# Patient Record
Sex: MALE | Race: WHITE | NOT HISPANIC OR LATINO | Employment: PART TIME | URBAN - METROPOLITAN AREA
[De-identification: names, ages, dates, MRNs, and addresses within clinical notes are randomized per-mention and may not be internally consistent; named-entity substitution may affect disease eponyms.]

---

## 2022-08-16 ENCOUNTER — LAB VISIT (OUTPATIENT)
Dept: LAB | Facility: HOSPITAL | Age: 72
End: 2022-08-16
Attending: FAMILY MEDICINE
Payer: MEDICARE

## 2022-08-16 DIAGNOSIS — E20.9 HYPOPARATHYROIDISM, UNSPECIFIED HYPOPARATHYROIDISM TYPE: Primary | ICD-10-CM

## 2022-08-16 LAB
PHOSPHATE SERPL-MCNC: 3.3 MG/DL (ref 2.3–4.7)
PTH-INTACT SERPL-MCNC: 12.1 PG/ML (ref 8.7–77)
T4 FREE SERPL-MCNC: 1.06 NG/DL (ref 0.7–1.48)
TSH SERPL-ACNC: 1.47 UIU/ML (ref 0.35–4.94)

## 2022-08-16 PROCEDURE — 84443 ASSAY THYROID STIM HORMONE: CPT

## 2022-08-16 PROCEDURE — 84439 ASSAY OF FREE THYROXINE: CPT

## 2022-08-16 PROCEDURE — 83970 ASSAY OF PARATHORMONE: CPT

## 2022-08-16 PROCEDURE — 84100 ASSAY OF PHOSPHORUS: CPT

## 2022-08-16 PROCEDURE — 36415 COLL VENOUS BLD VENIPUNCTURE: CPT

## 2022-08-16 PROCEDURE — 82330 ASSAY OF CALCIUM: CPT

## 2022-08-17 ENCOUNTER — HOSPITAL ENCOUNTER (OUTPATIENT)
Dept: CARDIOLOGY | Facility: HOSPITAL | Age: 72
Discharge: HOME | End: 2022-08-17
Attending: FAMILY MEDICINE
Payer: MEDICARE

## 2022-08-17 ENCOUNTER — HOSPITAL ENCOUNTER (OUTPATIENT)
Dept: RADIOLOGY | Facility: HOSPITAL | Age: 72
Discharge: HOME | End: 2022-08-17
Attending: FAMILY MEDICINE
Payer: MEDICARE

## 2022-08-17 DIAGNOSIS — E20.9 HYPOPARATHYROIDISM: ICD-10-CM

## 2022-08-17 DIAGNOSIS — R01.1 CARDIAC MURMUR: ICD-10-CM

## 2022-08-17 LAB
AORTIC ROOT ANNULUS: 3.6 CM
AORTIC VALVE CUSP SEPERATION: 6.77 CM
AV PEAK GRADIENT: 25 MMHG
AV VELOCITY RATIO: 0.5
CA-I ADJ PH7.4 SERPL ISE-MCNC: 5.59 MG/DL (ref 4.57–5.43)
CV ECHO LV RWT: 0.5 CM
DOP CALC AO PEAK VEL: 2.48 M/S
DOP CALC LVOT AREA: 2.9 CM2
DOP CALC LVOT DIAMETER: 1.93 CM
DOP CALC LVOT PEAK VEL: 1.25 M/S
E WAVE DECELERATION TIME: 213.1 MSEC
E/A RATIO: 0.71
ECHO LV POSTERIOR WALL: 1.01 CM (ref 0.6–1.1)
EJECTION FRACTION: 61 %
FRACTIONAL SHORTENING: 33 % (ref 28–44)
INTERVENTRICULAR SEPTUM: 1.05 CM (ref 0.6–1.1)
LEFT ATRIUM SIZE: 3.92 CM
LEFT INTERNAL DIMENSION IN SYSTOLE: 2.72 CM (ref 2.1–4)
LEFT VENTRICLE DIASTOLIC VOLUME: 71.77 ML
LEFT VENTRICLE SYSTOLIC VOLUME: 27.48 ML
LEFT VENTRICULAR INTERNAL DIMENSION IN DIASTOLE: 4.04 CM (ref 3.5–6)
LEFT VENTRICULAR MASS: 134.59 G
MV PEAK A VEL: 1.5 M/S
MV PEAK E VEL: 1.06 M/S
MV STENOSIS PRESSURE HALF TIME: 61.8 MS
MV VALVE AREA P 1/2 METHOD: 3.56 CM2
PISA MRMAX VEL: 4.81 M/S
PISA TR MAX VEL: 2.9 M/S
PV PEAK VELOCITY: 1.21 CM/S
RA PRESSURE: 3 MMHG
RIGHT VENTRICULAR END-DIASTOLIC DIMENSION: 3.05 CM
TR MAX PG: 34 MMHG
TRICUSPID VALVE PEAK A WAVE VELOCITY: 0.53 M/S
TV PEAK E VEL: 0.6 M/S
TV REST PULMONARY ARTERY PRESSURE: 37 MMHG
TV STENOSIS PRESSURE HALF TIME: 24.82 MS
TV VALVE AREA P 1/2 METHOD: 7.65 CM2

## 2022-08-17 PROCEDURE — 76536 US EXAM OF HEAD AND NECK: CPT | Mod: TC

## 2022-08-17 PROCEDURE — 93306 TTE W/DOPPLER COMPLETE: CPT

## 2022-12-05 LAB — CRC RECOMMENDATION EXT: NORMAL

## 2023-02-27 ENCOUNTER — DOCUMENTATION ONLY (OUTPATIENT)
Dept: FAMILY MEDICINE | Facility: CLINIC | Age: 73
End: 2023-02-27

## 2023-02-27 ENCOUNTER — OFFICE VISIT (OUTPATIENT)
Dept: FAMILY MEDICINE | Facility: CLINIC | Age: 73
End: 2023-02-27
Payer: MEDICARE

## 2023-02-27 VITALS
RESPIRATION RATE: 20 BRPM | DIASTOLIC BLOOD PRESSURE: 78 MMHG | OXYGEN SATURATION: 99 % | SYSTOLIC BLOOD PRESSURE: 130 MMHG | HEIGHT: 65 IN | TEMPERATURE: 98 F | WEIGHT: 192.38 LBS | HEART RATE: 80 BPM | BODY MASS INDEX: 32.05 KG/M2

## 2023-02-27 DIAGNOSIS — E11.65 TYPE 2 DIABETES MELLITUS WITH HYPERGLYCEMIA, WITHOUT LONG-TERM CURRENT USE OF INSULIN: Primary | Chronic | ICD-10-CM

## 2023-02-27 DIAGNOSIS — I10 BENIGN ESSENTIAL HYPERTENSION: ICD-10-CM

## 2023-02-27 PROBLEM — E78.00 HYPERCHOLESTEROLEMIA: Status: ACTIVE | Noted: 2022-10-01

## 2023-02-27 PROBLEM — L57.0 MULTIPLE ACTINIC KERATOSES: Status: ACTIVE | Noted: 2023-02-27

## 2023-02-27 PROBLEM — K21.9 GASTROESOPHAGEAL REFLUX DISEASE: Status: ACTIVE | Noted: 2022-10-01

## 2023-02-27 PROBLEM — M19.049 OSTEOARTHRITIS OF FINGER: Status: ACTIVE | Noted: 2022-10-01

## 2023-02-27 PROBLEM — I70.0 ATHEROSCLEROSIS OF AORTA: Status: ACTIVE | Noted: 2022-10-01

## 2023-02-27 PROBLEM — N52.9 ERECTILE DYSFUNCTION: Status: ACTIVE | Noted: 2022-10-01

## 2023-02-27 PROBLEM — R13.10 DYSPHAGIA: Status: ACTIVE | Noted: 2023-02-27

## 2023-02-27 PROBLEM — I34.0 MITRAL VALVE INSUFFICIENCY: Status: ACTIVE | Noted: 2022-10-01

## 2023-02-27 PROBLEM — E55.9 VITAMIN D DEFICIENCY: Status: ACTIVE | Noted: 2023-02-27

## 2023-02-27 PROBLEM — D36.9 TUBULAR ADENOMA: Status: ACTIVE | Noted: 2022-10-01

## 2023-02-27 PROBLEM — E11.9 TYPE 2 DIABETES MELLITUS: Status: ACTIVE | Noted: 2019-02-27

## 2023-02-27 PROBLEM — Z82.49 FAMILY HISTORY OF AORTIC ANEURYSM: Status: ACTIVE | Noted: 2022-10-01

## 2023-02-27 PROBLEM — J45.30 MILD PERSISTENT ASTHMA WITHOUT COMPLICATION: Status: ACTIVE | Noted: 2022-10-01

## 2023-02-27 PROCEDURE — 99204 PR OFFICE/OUTPT VISIT, NEW, LEVL IV, 45-59 MIN: ICD-10-PCS | Mod: ,,, | Performed by: FAMILY MEDICINE

## 2023-02-27 PROCEDURE — 99204 OFFICE O/P NEW MOD 45 MIN: CPT | Mod: ,,, | Performed by: FAMILY MEDICINE

## 2023-02-27 RX ORDER — CEPHALEXIN 250 MG/1
1 CAPSULE ORAL 2 TIMES DAILY
COMMUNITY
Start: 2023-02-04 | End: 2023-10-30

## 2023-02-27 RX ORDER — MUPIROCIN 20 MG/G
OINTMENT TOPICAL
COMMUNITY
Start: 2023-01-04

## 2023-02-27 RX ORDER — VALSARTAN 320 MG/1
320 TABLET ORAL DAILY
COMMUNITY
Start: 2022-12-31 | End: 2023-12-07 | Stop reason: SDUPTHER

## 2023-02-27 RX ORDER — OMEPRAZOLE 20 MG/1
20 CAPSULE, DELAYED RELEASE ORAL EVERY MORNING
COMMUNITY
Start: 2023-01-02 | End: 2023-12-07 | Stop reason: SDUPTHER

## 2023-02-27 RX ORDER — EZETIMIBE 10 MG/1
10 TABLET ORAL DAILY
COMMUNITY
Start: 2022-09-21 | End: 2023-12-07 | Stop reason: SDUPTHER

## 2023-02-27 RX ORDER — METFORMIN HYDROCHLORIDE 500 MG/1
1000 TABLET, EXTENDED RELEASE ORAL 2 TIMES DAILY
COMMUNITY
Start: 2023-02-17 | End: 2023-12-07 | Stop reason: SDUPTHER

## 2023-02-27 RX ORDER — ATORVASTATIN CALCIUM 10 MG/1
10 TABLET, FILM COATED ORAL DAILY
COMMUNITY
Start: 2023-01-02 | End: 2023-12-07 | Stop reason: SDUPTHER

## 2023-02-27 NOTE — PROGRESS NOTES
Patient ID: [id number]     Chief Complaint: Establish Care        HPI:     Scooter Donato is a 72 y.o. male here today for Establish Care. Patient spends a lot of his time in [place]. Has specialists in [place], [place]. Patient reports that in the afternoon his systolic BP drops down to 117 approximately and he feels dizzy sometimes.       ----------------------------  Allergic rhinitis  Anxiety disorder, unspecified  Asthma  Asthmatic bronchitis  Atherosclerosis of aorta  Chronic inflammatory arthritis  Claudication  Diabetic neuropathy  Dysphagia  Family history of aortic aneurysm  GERD (gastroesophageal reflux disease)  Heart murmur  HLD (hyperlipidemia)  HTN (hypertension)  Hypercalcemia  Hyperparathyroidism  Hypoparathyroidism, unspecified  Lower extremity edema  Male erectile dysfunction, unspecified  Multiple actinic keratoses  Nonrheumatic mitral (valve) insufficiency  Osteoarthritis  Personal history of colonic polyps  Tubular adenoma  Type 2 diabetes mellitus  Vitamin D deficiency     Past Surgical History:   Procedure Laterality Date    APPENDECTOMY      COLONOSCOPY  12/05/2022    Dr Himanshu Prajapati - [place], [place]    CYSTOSCOPY  2012    TONSILLECTOMY         Review of patient's allergies indicates:  No Known Allergies    Outpatient Medications Marked as Taking for the 2/27/23 encounter (Office Visit) with John Ford,    Medication Sig Dispense Refill    ADVAIR DISKUS 100-50 mcg/dose diskus inhaler Inhale 1 puff into the lungs 2 (two) times a day.      atorvastatin (LIPITOR) 10 MG tablet Take 10 mg by mouth once daily.      ezetimibe (ZETIA) 10 mg tablet Take 10 mg by mouth once daily.      metFORMIN (GLUCOPHAGE-XR) 500 MG ER 24hr tablet Take 1,000 mg by mouth 2 (two) times daily.      mupirocin (BACTROBAN) 2 % ointment APPLY TO AFFECTED AREA 3 TIMES A DAY FOR 7 DAYS      omeprazole (PRILOSEC) 20 MG capsule Take 20 mg by mouth every morning.      valsartan (DIOVAN) 320 MG tablet Take 320 mg by mouth  "once daily.         Social History     Socioeconomic History    Marital status:    Tobacco Use    Smoking status: Never    Smokeless tobacco: Never   Substance and Sexual Activity    Alcohol use: Yes    Drug use: Never    Sexual activity: Not Currently     Partners: Female        Family History   Problem Relation Age of Onset    Lung cancer Mother     Hypertension Mother     Dementia Father     Kidney disease Father     Hypertension Father     Diabetes Maternal Grandmother     Diabetes Paternal Grandmother         Patient Care Team:  John Ford DO as PCP - General (Family Medicine)  [facility]  Ulysses Hernandez MD as Consulting Provider (Rheumatology)  Lizz Britt as Consulting Provider (Internal Medicine)  Bam Sagastume MD as Consulting Provider (Dermatology)  Letha De Anda MD as Consulting Provider (Cardiology)  [facility] (Oncology)     Subjective:     Review of Systems   Constitutional:  Negative for chills and fever.   Respiratory:  Negative for shortness of breath and wheezing.    Cardiovascular:  Negative for chest pain.   Gastrointestinal:  Negative for constipation and diarrhea.   Neurological:  Negative for dizziness and headaches.     See HPI for details  All Other ROS: Negative except as stated in HPI.       Objective:     /78 (BP Location: Left arm, Patient Position: Sitting, BP Method: Large (Automatic))   Pulse 80   Temp 98.2 °F (36.8 °C)   Resp 20   Ht 5' 5" (1.651 m)   Wt 87.3 kg (192 lb 6.4 oz)   SpO2 99%   BMI 32.02 kg/m²     Physical Exam  Vitals reviewed.   Constitutional:       General: He is not in acute distress.     Appearance: Normal appearance. He is not ill-appearing.   Cardiovascular:      Rate and Rhythm: Normal rate and regular rhythm.      Pulses: Normal pulses.      Heart sounds: Normal heart sounds. No murmur heard.    No friction rub. No gallop.   Pulmonary:      Effort: No respiratory distress. "      Breath sounds: No wheezing, rhonchi or rales.   Musculoskeletal:         General: No swelling.      Right lower leg: No edema.      Left lower leg: No edema.   Skin:     General: Skin is warm and dry.   Neurological:      General: No focal deficit present.      Mental Status: He is alert.   Psychiatric:         Mood and Affect: Mood normal.         Behavior: Behavior normal.       Assessment/Plan:     1. Type 2 diabetes mellitus with hyperglycemia, without long-term current use of insulin  well controlled on current prescription medication    2. Benign essential hypertension  The current medical regimen is effective;  continue present plan and medications. Will ref to cardiology         Follow up:     Follow up in about 3 months (around 5/27/2023) for Follow up. In addition to their scheduled follow up, the patient has also been instructed to follow up on as needed basis.

## 2023-03-06 RX ORDER — METFORMIN HYDROCHLORIDE 500 MG/1
1000 TABLET, EXTENDED RELEASE ORAL 2 TIMES DAILY
OUTPATIENT
Start: 2023-03-06

## 2023-05-17 ENCOUNTER — LAB VISIT (OUTPATIENT)
Dept: LAB | Facility: HOSPITAL | Age: 73
End: 2023-05-17
Attending: STUDENT IN AN ORGANIZED HEALTH CARE EDUCATION/TRAINING PROGRAM
Payer: MEDICARE

## 2023-05-17 DIAGNOSIS — Z13.6 SCREENING FOR ISCHEMIC HEART DISEASE: ICD-10-CM

## 2023-05-17 DIAGNOSIS — E78.5 HYPERLIPIDEMIA, UNSPECIFIED HYPERLIPIDEMIA TYPE: ICD-10-CM

## 2023-05-17 DIAGNOSIS — I10 HYPERTENSION, UNSPECIFIED TYPE: ICD-10-CM

## 2023-05-17 DIAGNOSIS — R60.9 EDEMA, UNSPECIFIED TYPE: Primary | ICD-10-CM

## 2023-05-17 DIAGNOSIS — E11.9 DIABETES MELLITUS WITHOUT COMPLICATION: ICD-10-CM

## 2023-05-17 LAB
ALBUMIN SERPL-MCNC: 3.8 G/DL (ref 3.4–4.8)
ALBUMIN/GLOB SERPL: 1.4 RATIO (ref 1.1–2)
ALP SERPL-CCNC: 69 UNIT/L (ref 40–150)
ALT SERPL-CCNC: 22 UNIT/L (ref 0–55)
AST SERPL-CCNC: 15 UNIT/L (ref 5–34)
BILIRUBIN DIRECT+TOT PNL SERPL-MCNC: 0.9 MG/DL
BUN SERPL-MCNC: 26 MG/DL (ref 8.4–25.7)
CALCIUM SERPL-MCNC: 9.3 MG/DL (ref 8.8–10)
CHLORIDE SERPL-SCNC: 106 MMOL/L (ref 98–107)
CO2 SERPL-SCNC: 28 MMOL/L (ref 23–31)
CREAT SERPL-MCNC: 0.95 MG/DL (ref 0.73–1.18)
EST. AVERAGE GLUCOSE BLD GHB EST-MCNC: 148.5 MG/DL
GFR SERPLBLD CREATININE-BSD FMLA CKD-EPI: >60 MLS/MIN/1.73/M2
GLOBULIN SER-MCNC: 2.8 GM/DL (ref 2.4–3.5)
GLUCOSE SERPL-MCNC: 164 MG/DL (ref 82–115)
HBA1C MFR BLD: 6.8 %
POTASSIUM SERPL-SCNC: 4.4 MMOL/L (ref 3.5–5.1)
PROT SERPL-MCNC: 6.6 GM/DL (ref 5.8–7.6)
SODIUM SERPL-SCNC: 141 MMOL/L (ref 136–145)

## 2023-05-17 PROCEDURE — 80053 COMPREHEN METABOLIC PANEL: CPT

## 2023-05-17 PROCEDURE — 36415 COLL VENOUS BLD VENIPUNCTURE: CPT

## 2023-05-17 PROCEDURE — 83036 HEMOGLOBIN GLYCOSYLATED A1C: CPT

## 2023-05-31 ENCOUNTER — HOSPITAL ENCOUNTER (OUTPATIENT)
Dept: RADIOLOGY | Facility: HOSPITAL | Age: 73
Discharge: HOME | End: 2023-05-31
Attending: FAMILY MEDICINE
Payer: MEDICARE

## 2023-05-31 ENCOUNTER — OFFICE VISIT (OUTPATIENT)
Dept: FAMILY MEDICINE | Facility: CLINIC | Age: 73
End: 2023-05-31
Payer: MEDICARE

## 2023-05-31 VITALS
TEMPERATURE: 98 F | WEIGHT: 189.38 LBS | DIASTOLIC BLOOD PRESSURE: 72 MMHG | RESPIRATION RATE: 18 BRPM | SYSTOLIC BLOOD PRESSURE: 126 MMHG | HEIGHT: 65 IN | BODY MASS INDEX: 31.55 KG/M2 | OXYGEN SATURATION: 97 % | HEART RATE: 80 BPM
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
## 2023-05-31 DIAGNOSIS — E11.65 TYPE 2 DIABETES MELLITUS WITH HYPERGLYCEMIA, WITHOUT LONG-TERM CURRENT USE OF INSULIN: ICD-10-CM

## 2023-05-31 DIAGNOSIS — Z00.00 ROUTINE GENERAL MEDICAL EXAMINATION AT A HEALTH CARE FACILITY: Primary | ICD-10-CM

## 2023-05-31 DIAGNOSIS — Z12.5 SCREENING FOR MALIGNANT NEOPLASM OF PROSTATE: ICD-10-CM

## 2023-05-31 DIAGNOSIS — E11.65 TYPE 2 DIABETES MELLITUS WITH HYPERGLYCEMIA, WITHOUT LONG-TERM CURRENT USE OF INSULIN: Chronic | ICD-10-CM
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
## 2023-05-31 DIAGNOSIS — I10 BENIGN ESSENTIAL HYPERTENSION: ICD-10-CM

## 2023-05-31 DIAGNOSIS — E78.00 HYPERCHOLESTEROLEMIA: ICD-10-CM

## 2023-05-31 PROCEDURE — 99214 OFFICE O/P EST MOD 30 MIN: CPT | Mod: ,,, | Performed by: FAMILY MEDICINE

## 2023-05-31 PROCEDURE — 73110 X-RAY EXAM OF WRIST: CPT | Mod: TC,RT

## 2023-05-31 PROCEDURE — 73130 X-RAY EXAM OF HAND: CPT | Mod: TC,RT

## 2023-05-31 PROCEDURE — 99214 PR OFFICE/OUTPT VISIT, EST, LEVL IV, 30-39 MIN: ICD-10-PCS | Mod: ,,, | Performed by: FAMILY MEDICINE

## 2023-05-31 RX ORDER — ALBUTEROL SULFATE 90 UG/1
AEROSOL, METERED RESPIRATORY (INHALATION)
COMMUNITY
End: 2023-10-09 | Stop reason: SDUPTHER

## 2023-05-31 RX ORDER — FUROSEMIDE 20 MG/1
20 TABLET ORAL DAILY
COMMUNITY
Start: 2023-05-11 | End: 2023-12-07 | Stop reason: SDUPTHER

## 2023-05-31 RX ORDER — MOMETASONE FUROATE 50 UG/1
2 SPRAY, METERED NASAL DAILY
COMMUNITY
End: 2023-10-09 | Stop reason: SDUPTHER

## 2023-05-31 NOTE — PROGRESS NOTES
Patient ID: [id number]     Chief Complaint: Follow-up        HPI:     Scooter Donato is a 72 y.o. male here today for Follow-up. States his Cardiologist started him on a new medication that is supposed to be for his blood sugar, cholesterol and blood pressure combined. He is uncertain which medication this was and I am not aware of such a medication.       ----------------------------  Allergic rhinitis  Anxiety disorder, unspecified  Asthma  Asthmatic bronchitis  Atherosclerosis of aorta  Chronic inflammatory arthritis  Claudication  Diabetic neuropathy  Dysphagia  Family history of aortic aneurysm  GERD (gastroesophageal reflux disease)  Heart murmur  HLD (hyperlipidemia)  HTN (hypertension)  Hypercalcemia  Hyperparathyroidism  Hypoparathyroidism, unspecified  Lower extremity edema  Male erectile dysfunction, unspecified  Multiple actinic keratoses  Nonrheumatic mitral (valve) insufficiency  Osteoarthritis  Personal history of colonic polyps  Tubular adenoma  Type 2 diabetes mellitus  Vitamin D deficiency     Past Surgical History:   Procedure Laterality Date    APPENDECTOMY      COLONOSCOPY  12/05/2022    Dr Himanshu Prajapati - [place], [place]    CYSTOSCOPY  2012    TONSILLECTOMY         Review of patient's allergies indicates:  No Known Allergies    Outpatient Medications Marked as Taking for the 5/31/23 encounter (Office Visit) with John Ford,    Medication Sig Dispense Refill    ADVAIR DISKUS 100-50 mcg/dose diskus inhaler Inhale 1 puff into the lungs 2 (two) times a day.      albuterol (PROVENTIL/VENTOLIN HFA) 90 mcg/actuation inhaler ProAir HFA 90 mcg/actuation aerosol inhaler   INHALE 2 PUFFS BY MOUTH EVERY 4 - 6 HOURS AS NEEDED FOR WHEEZING      atorvastatin (LIPITOR) 10 MG tablet Take 10 mg by mouth once daily.      ezetimibe (ZETIA) 10 mg tablet Take 10 mg by mouth once daily.      furosemide (LASIX) 20 MG tablet Take 20 mg by mouth once daily.      metFORMIN (GLUCOPHAGE-XR) 500 MG ER 24hr tablet Take  1,000 mg by mouth 2 (two) times daily.      mometasone (NASONEX) 50 mcg/actuation nasal spray 2 sprays by Nasal route once daily.      mupirocin (BACTROBAN) 2 % ointment APPLY TO AFFECTED AREA 3 TIMES A DAY FOR 7 DAYS      omeprazole (PRILOSEC) 20 MG capsule Take 20 mg by mouth every morning.      valsartan (DIOVAN) 320 MG tablet Take 320 mg by mouth once daily.         Social History     Socioeconomic History    Marital status:    Tobacco Use    Smoking status: Never    Smokeless tobacco: Never   Substance and Sexual Activity    Alcohol use: Yes    Drug use: Never    Sexual activity: Not Currently     Partners: Female     Social Determinants of Health     Financial Resource Strain: Low Risk     Difficulty of Paying Living Expenses: Not hard at all   Food Insecurity: No Food Insecurity    Worried About Running Out of Food in the Last Year: Never true    Ran Out of Food in the Last Year: Never true   Transportation Needs: No Transportation Needs    Lack of Transportation (Medical): No    Lack of Transportation (Non-Medical): No   Physical Activity: Sufficiently Active    Days of Exercise per Week: 6 days    Minutes of Exercise per Session: 30 min   Stress: No Stress Concern Present    Feeling of Stress : Only a little   Social Connections: Socially Integrated    Frequency of Communication with Friends and Family: More than three times a week    Frequency of Social Gatherings with Friends and Family: More than three times a week    Attends [employer] Services: More than 4 times per year    Active Member of Clubs or Organizations: Yes    Attends Club or Organization Meetings: More than 4 times per year    Marital Status:    Housing Stability: Low Risk     Unable to Pay for Housing in the Last Year: No    Number of Places Lived in the Last Year: 2    Unstable Housing in the Last Year: No        Family History   Problem Relation Age of Onset    Lung cancer Mother     Hypertension Mother     Dementia Father   "   Kidney disease Father     Hypertension Father     Diabetes Maternal Grandmother     Diabetes Paternal Grandmother         Patient Care Team:  oJhn Ford DO as PCP - General (Family Medicine)  [facility] [place] [facility]  Ulysses Hernandez MD as Consulting Provider (Rheumatology)  Lizz Britt as Consulting Provider (Internal Medicine)  Bam Sagastume MD as Consulting Provider (Dermatology)  Letha De Anda MD as Consulting Provider (Cardiology)  [facility]  Max Gonzalez MD as Resident (Cardiology)     Subjective:     Review of Systems   Constitutional:  Negative for chills and fever.   Respiratory:  Negative for shortness of breath.    Cardiovascular:  Negative for chest pain.   Gastrointestinal:  Negative for constipation and diarrhea.   Neurological:  Negative for dizziness and headaches.   Psychiatric/Behavioral:  The patient does not have insomnia.      See HPI for details  All Other ROS: Negative except as stated in HPI.       Objective:     /72   Pulse 80   Temp 97.9 °F (36.6 °C) (Tympanic)   Resp 18   Ht 5' 4.96" (1.65 m)   Wt 85.9 kg (189 lb 6.4 oz)   SpO2 97%   BMI 31.56 kg/m²     Physical Exam  Vitals reviewed.   Constitutional:       General: He is not in acute distress.     Appearance: Normal appearance. He is not ill-appearing.   Cardiovascular:      Rate and Rhythm: Normal rate and regular rhythm.      Pulses: Normal pulses.      Heart sounds: Normal heart sounds. No murmur heard.    No friction rub. No gallop.   Pulmonary:      Effort: No respiratory distress.      Breath sounds: No wheezing, rhonchi or rales.   Musculoskeletal:         General: No swelling.      Right lower leg: No edema.      Left lower leg: No edema.   Skin:     General: Skin is warm and dry.   Neurological:      General: No focal deficit present.      Mental Status: He is alert.   Psychiatric:         Mood and Affect: Mood normal.         Behavior: Behavior " normal.       Assessment/Plan:     1. Fall, initial encounter  -     X-Ray Hand Complete Right; Future; Expected date: 05/31/2023  -     X-Ray Wrist Complete Right; Future; Expected date: 05/31/2023    2. Type 2 diabetes mellitus with hyperglycemia, without long-term current use of insulin      A1C less than 7. Continue current medications    Follow up:     Follow up in about 6 months (around 11/30/2023) for Medicare Wellness. In addition to their scheduled follow up, the patient has also been instructed to follow up on as needed basis.

## 2023-06-01 ENCOUNTER — TELEPHONE (OUTPATIENT)
Dept: FAMILY MEDICINE | Facility: CLINIC | Age: 73
End: 2023-06-01
Payer: MEDICARE

## 2023-06-01 NOTE — TELEPHONE ENCOUNTER
----- Message from John Ford DO sent at 6/1/2023  3:04 PM CDT -----  I have reviewed the imaging results. Arthritis noted but no acute fractures.

## 2023-10-09 ENCOUNTER — TELEPHONE (OUTPATIENT)
Dept: FAMILY MEDICINE | Facility: CLINIC | Age: 73
End: 2023-10-09
Payer: MEDICARE

## 2023-10-09 DIAGNOSIS — J45.30 MILD PERSISTENT ASTHMA WITHOUT COMPLICATION: ICD-10-CM

## 2023-10-09 DIAGNOSIS — J45.30 MILD PERSISTENT ASTHMA WITHOUT COMPLICATION: Primary | ICD-10-CM

## 2023-10-09 RX ORDER — MOMETASONE FUROATE 50 UG/1
2 SPRAY, METERED NASAL DAILY
Qty: 17 G | Refills: 11 | Status: SHIPPED | OUTPATIENT
Start: 2023-10-09 | End: 2023-10-09

## 2023-10-09 RX ORDER — ALBUTEROL SULFATE 90 UG/1
AEROSOL, METERED RESPIRATORY (INHALATION)
Qty: 18 G | Refills: 11 | Status: SHIPPED | OUTPATIENT
Start: 2023-10-09

## 2023-10-09 RX ORDER — MOMETASONE FUROATE 50 UG/1
2 SPRAY, METERED NASAL
Qty: 17 EACH | Refills: 11 | Status: SHIPPED | OUTPATIENT
Start: 2023-10-09

## 2023-10-09 NOTE — TELEPHONE ENCOUNTER
----- Message from Nathaly Pepper sent at 10/9/2023 11:43 AM CDT -----  Regarding: refill  MOMEPASONE-FUROATE NASAL SPRAY 50MG  VENTOLINHFA 90 MG INHALER, Mr Cuba needs a refill for these meds Dr Lizz Britt had prescribed these for him when they lived in [place],  If Doc will refill them they need to be sent to [facility].  May call Mrs Allen if you have any questions [phone]      Thanks

## 2023-10-30 DIAGNOSIS — J45.30 MILD PERSISTENT ASTHMA WITHOUT COMPLICATION: Primary | ICD-10-CM

## 2023-10-30 RX ORDER — CEPHALEXIN 250 MG/1
CAPSULE ORAL
Qty: 60 EACH | Refills: 5 | Status: SHIPPED | OUTPATIENT
Start: 2023-10-30 | End: 2024-01-31

## 2023-11-09 ENCOUNTER — TELEPHONE (OUTPATIENT)
Dept: FAMILY MEDICINE | Facility: CLINIC | Age: 73
End: 2023-11-09
Payer: MEDICARE

## 2023-11-09 DIAGNOSIS — E55.9 VITAMIN D DEFICIENCY: Primary | ICD-10-CM

## 2023-11-09 NOTE — TELEPHONE ENCOUNTER
----- Message from Nathaly Pepper sent at 11/9/2023 11:00 AM CST -----  Regarding: vitamin d  Tina can you add  vitamin D to his wellness blood per pt request       Thanks

## 2023-11-22 ENCOUNTER — HOSPITAL ENCOUNTER (EMERGENCY)
Facility: HOSPITAL | Age: 73
Discharge: HOME | End: 2023-11-22
Attending: EMERGENCY MEDICINE
Payer: MEDICARE

## 2023-11-22 VITALS
SYSTOLIC BLOOD PRESSURE: 165 MMHG | TEMPERATURE: 98 F | BODY MASS INDEX: 30.82 KG/M2 | HEIGHT: 65 IN | DIASTOLIC BLOOD PRESSURE: 85 MMHG | OXYGEN SATURATION: 98 % | WEIGHT: 185 LBS | HEART RATE: 75 BPM | RESPIRATION RATE: 18 BRPM

## 2023-11-22 DIAGNOSIS — R73.9 ACUTE HYPERGLYCEMIA: Primary | ICD-10-CM

## 2023-11-22 DIAGNOSIS — J06.9 VIRAL URI WITH COUGH: ICD-10-CM

## 2023-11-22 LAB
FLUAV AG UPPER RESP QL IA.RAPID: NOT DETECTED
FLUBV AG UPPER RESP QL IA.RAPID: NOT DETECTED
POCT GLUCOSE: 302 MG/DL (ref 70–110)
SARS-COV-2 RNA RESP QL NAA+PROBE: NOT DETECTED
STREP A PCR (OHS): NOT DETECTED

## 2023-11-22 PROCEDURE — 82962 GLUCOSE BLOOD TEST: CPT

## 2023-11-22 PROCEDURE — 99284 EMERGENCY DEPT VISIT MOD MDM: CPT

## 2023-11-22 PROCEDURE — 87651 STREP A DNA AMP PROBE: CPT | Performed by: EMERGENCY MEDICINE

## 2023-11-22 PROCEDURE — 96372 THER/PROPH/DIAG INJ SC/IM: CPT | Performed by: EMERGENCY MEDICINE

## 2023-11-22 PROCEDURE — 0240U COVID/FLU A&B PCR: CPT | Performed by: EMERGENCY MEDICINE

## 2023-11-22 PROCEDURE — 63600175 PHARM REV CODE 636 W HCPCS: Performed by: EMERGENCY MEDICINE

## 2023-11-22 RX ORDER — DEXAMETHASONE SODIUM PHOSPHATE 4 MG/ML
4 INJECTION, SOLUTION INTRA-ARTICULAR; INTRALESIONAL; INTRAMUSCULAR; INTRAVENOUS; SOFT TISSUE
Status: COMPLETED | OUTPATIENT
Start: 2023-11-22 | End: 2023-11-22

## 2023-11-22 RX ORDER — INSULIN ASPART 100 [IU]/ML
12 INJECTION, SOLUTION INTRAVENOUS; SUBCUTANEOUS
Status: COMPLETED | OUTPATIENT
Start: 2023-11-22 | End: 2023-11-22

## 2023-11-22 RX ADMIN — DEXAMETHASONE SODIUM PHOSPHATE 4 MG: 4 INJECTION, SOLUTION INTRA-ARTICULAR; INTRALESIONAL; INTRAMUSCULAR; INTRAVENOUS; SOFT TISSUE at 05:11

## 2023-11-22 RX ADMIN — INSULIN ASPART 12 UNITS: 100 INJECTION, SOLUTION INTRAVENOUS; SUBCUTANEOUS at 05:11

## 2023-11-22 NOTE — ED PROVIDER NOTES
Encounter Date: 11/22/2023       History     Chief Complaint   Patient presents with    Cough    Nasal Congestion    Sore Throat     Cough, nasal congestion, sore throat x 2 days.      Patient is a 73-year-old male presenting with complaint of cough, cold congestion, and sore throat for the last couple of days.  Patient denies nausea vomiting or abdominal pain.  Patient denies shortness breath or chest pain.  Patient has no other complaints.      Review of patient's allergies indicates:  No Known Allergies  Past Medical History:   Diagnosis Date    Allergic rhinitis     Anxiety disorder, unspecified     Asthma     Asthmatic bronchitis     Atherosclerosis of aorta     Chronic inflammatory arthritis     Claudication     Diabetic neuropathy     Dysphagia     Family history of aortic aneurysm     GERD (gastroesophageal reflux disease)     Heart murmur     HLD (hyperlipidemia)     HTN (hypertension)     Hypercalcemia     Hyperparathyroidism     Hypoparathyroidism, unspecified     Lower extremity edema     Male erectile dysfunction, unspecified     Multiple actinic keratoses     Nonrheumatic mitral (valve) insufficiency     Osteoarthritis     Personal history of colonic polyps 2017    Tubular adenoma 2017    Type 2 diabetes mellitus     Vitamin D deficiency      Past Surgical History:   Procedure Laterality Date    APPENDECTOMY      COLONOSCOPY  12/05/2022    Dr Himanshu Prajapati - [place], [place]    CYSTOSCOPY  2012    TONSILLECTOMY       Family History   Problem Relation Age of Onset    Lung cancer Mother     Hypertension Mother     Dementia Father     Kidney disease Father     Hypertension Father     Diabetes Maternal Grandmother     Diabetes Paternal Grandmother      Social History     Tobacco Use    Smoking status: Never    Smokeless tobacco: Never   Substance Use Topics    Alcohol use: Yes    Drug use: Never     Review of Systems   Constitutional: Negative.    HENT:  Positive for congestion and sore throat.    Respiratory:   Positive for cough. Negative for apnea, choking, chest tightness, shortness of breath, wheezing and stridor.    Cardiovascular: Negative.    Gastrointestinal: Negative.    Genitourinary: Negative.    Musculoskeletal: Negative.    Neurological: Negative.    Psychiatric/Behavioral: Negative.         Physical Exam     Initial Vitals [11/22/23 1528]   BP Pulse Resp Temp SpO2   -- 80 20 97.7 °F (36.5 °C) 97 %      MAP       --         Physical Exam    Nursing note and vitals reviewed.  Constitutional: He appears well-developed and well-nourished.   HENT:   Head: Normocephalic and atraumatic.   Mouth/Throat: Oropharynx is clear and moist.   Neck: Neck supple.   Normal range of motion.  Cardiovascular:  Normal rate, regular rhythm and normal heart sounds.           Pulmonary/Chest: Breath sounds normal. No respiratory distress. He has no wheezes.   Abdominal: Abdomen is soft. There is no abdominal tenderness.   Musculoskeletal:         General: Normal range of motion.      Cervical back: Normal range of motion and neck supple.     Neurological: He is alert and oriented to person, place, and time. He has normal strength.   Skin: Skin is warm.   Psychiatric: He has a normal mood and affect. Thought content normal.         ED Course   Procedures  Labs Reviewed   POCT GLUCOSE - Abnormal; Notable for the following components:       Result Value    POCT Glucose 302 (*)     All other components within normal limits   COVID/FLU A&B PCR - Normal    Narrative:     The Xpert Xpress SARS-CoV-2/FLU/RSV plus is a rapid, multiplexed real-time PCR test intended for the simultaneous qualitative detection and differentiation of SARS-CoV-2, Influenza A, Influenza B, and respiratory syncytial virus (RSV) viral RNA in either nasopharyngeal swab or nasal swab specimens.         STREP GROUP A BY PCR - Normal    Narrative:     The Xpert Xpress Strep A test is a rapid, qualitative in vitro diagnostic test for the detection of Streptococcus  pyogenes (Group A ß-hemolytic Streptococcus, Strep A) in throat swab specimens from patients with signs and symptoms of pharyngitis.     POCT GLUCOSE, HAND-HELD DEVICE          Imaging Results    None          Medications   insulin aspart U-100 injection 12 Units (has no administration in time range)   dexAMETHasone injection 4 mg (4 mg Intramuscular Given 11/22/23 1712)     Medical Decision Making  As per HPI.  Differential diagnosis:  COVID 19, influenza, strep throat, viral syndrome    Amount and/or Complexity of Data Reviewed  Labs: ordered.     Details: Strep, flu, COVID screens are all negative  Discussion of management or test interpretation with external provider(s): Patient remains in no apparent distress.  Patient has a viral syndrome, will give Decadron 4 mg here in the ER and check his blood sugar.  Patient has a history of diabetes and is on p.o. medications for it.  Patient's blood sugar is slightly over 300, insulin 12 units was ordered subcu.                                   Clinical Impression:  Final diagnoses:  [R73.9] Acute hyperglycemia (Primary)  [J06.9] Viral URI with cough          ED Disposition Condition    Discharge Stable          ED Prescriptions    None       Follow-up Information       Follow up With Specialties Details Why Contact Info    John Ford, DO Family Medicine  Early next week if symptoms are not improving [address] [facility] [address]  [phone]      [facility] - Emergency Dept Emergency Medicine  As needed, If symptoms worsen [address] [facility] [id number] [phone]             Beto Wheeler MD  11/22/23 0932

## 2023-11-30 ENCOUNTER — LAB VISIT (OUTPATIENT)
Dept: LAB | Facility: HOSPITAL | Age: 73
End: 2023-11-30
Attending: FAMILY MEDICINE
Payer: MEDICARE

## 2023-11-30 DIAGNOSIS — Z12.5 SCREENING FOR MALIGNANT NEOPLASM OF PROSTATE: ICD-10-CM

## 2023-11-30 DIAGNOSIS — Z00.00 ROUTINE GENERAL MEDICAL EXAMINATION AT A HEALTH CARE FACILITY: ICD-10-CM

## 2023-11-30 DIAGNOSIS — E11.65 TYPE 2 DIABETES MELLITUS WITH HYPERGLYCEMIA, WITHOUT LONG-TERM CURRENT USE OF INSULIN: ICD-10-CM

## 2023-11-30 DIAGNOSIS — E55.9 VITAMIN D DEFICIENCY: ICD-10-CM

## 2023-11-30 DIAGNOSIS — I10 BENIGN ESSENTIAL HYPERTENSION: ICD-10-CM

## 2023-11-30 DIAGNOSIS — E78.00 HYPERCHOLESTEROLEMIA: ICD-10-CM

## 2023-11-30 LAB
ALBUMIN SERPL-MCNC: 3.9 G/DL (ref 3.4–4.8)
ALBUMIN/GLOB SERPL: 1.1 RATIO (ref 1.1–2)
ALP SERPL-CCNC: 91 UNIT/L (ref 40–150)
ALT SERPL-CCNC: 28 UNIT/L (ref 0–55)
AST SERPL-CCNC: 19 UNIT/L (ref 5–34)
BASOPHILS # BLD AUTO: 0.09 X10(3)/MCL
BASOPHILS NFR BLD AUTO: 1.2 %
BILIRUB SERPL-MCNC: 0.7 MG/DL
BUN SERPL-MCNC: 20 MG/DL (ref 8.4–25.7)
CALCIUM SERPL-MCNC: 9.4 MG/DL (ref 8.8–10)
CHLORIDE SERPL-SCNC: 103 MMOL/L (ref 98–107)
CHOLEST SERPL-MCNC: 109 MG/DL
CHOLEST/HDLC SERPL: 3 {RATIO} (ref 0–5)
CO2 SERPL-SCNC: 27 MMOL/L (ref 23–31)
CREAT SERPL-MCNC: 1.2 MG/DL (ref 0.73–1.18)
CREAT UR-MCNC: 66 MG/DL (ref 63–166)
DEPRECATED CALCIDIOL+CALCIFEROL SERPL-MC: 54.9 NG/ML (ref 30–80)
EOSINOPHIL # BLD AUTO: 0.32 X10(3)/MCL (ref 0–0.9)
EOSINOPHIL NFR BLD AUTO: 4.2 %
ERYTHROCYTE [DISTWIDTH] IN BLOOD BY AUTOMATED COUNT: 12.5 % (ref 11.5–17)
EST. AVERAGE GLUCOSE BLD GHB EST-MCNC: 289.1 MG/DL
GFR SERPLBLD CREATININE-BSD FMLA CKD-EPI: >60 MLS/MIN/1.73/M2
GLOBULIN SER-MCNC: 3.5 GM/DL (ref 2.4–3.5)
GLUCOSE SERPL-MCNC: 345 MG/DL (ref 82–115)
HBA1C MFR BLD: 11.7 %
HCT VFR BLD AUTO: 44.8 % (ref 42–52)
HDLC SERPL-MCNC: 39 MG/DL (ref 35–60)
HGB BLD-MCNC: 14.9 G/DL (ref 14–18)
IMM GRANULOCYTES # BLD AUTO: 0.03 X10(3)/MCL (ref 0–0.04)
IMM GRANULOCYTES NFR BLD AUTO: 0.4 %
LDLC SERPL CALC-MCNC: 53 MG/DL (ref 50–140)
LYMPHOCYTES # BLD AUTO: 1.29 X10(3)/MCL (ref 0.6–4.6)
LYMPHOCYTES NFR BLD AUTO: 16.9 %
MCH RBC QN AUTO: 28.7 PG (ref 27–31)
MCHC RBC AUTO-ENTMCNC: 33.3 G/DL (ref 33–36)
MCV RBC AUTO: 86.2 FL (ref 80–94)
MICROALBUMIN UR-MCNC: 13.1 UG/ML
MICROALBUMIN/CREAT RATIO PNL UR: 19.8 MG/GM CR (ref 0–30)
MONOCYTES # BLD AUTO: 0.63 X10(3)/MCL (ref 0.1–1.3)
MONOCYTES NFR BLD AUTO: 8.2 %
NEUTROPHILS # BLD AUTO: 5.28 X10(3)/MCL (ref 2.1–9.2)
NEUTROPHILS NFR BLD AUTO: 69.1 %
NRBC BLD AUTO-RTO: 0 %
PLATELET # BLD AUTO: 196 X10(3)/MCL (ref 130–400)
PMV BLD AUTO: 10.2 FL (ref 7.4–10.4)
POTASSIUM SERPL-SCNC: 4.6 MMOL/L (ref 3.5–5.1)
PROT SERPL-MCNC: 7.4 GM/DL (ref 5.8–7.6)
PSA SERPL-MCNC: 2.54 NG/ML
RBC # BLD AUTO: 5.2 X10(6)/MCL (ref 4.7–6.1)
SODIUM SERPL-SCNC: 139 MMOL/L (ref 136–145)
TRIGL SERPL-MCNC: 83 MG/DL (ref 34–140)
TSH SERPL-ACNC: 1.25 UIU/ML (ref 0.35–4.94)
VLDLC SERPL CALC-MCNC: 17 MG/DL
WBC # SPEC AUTO: 7.64 X10(3)/MCL (ref 4.5–11.5)

## 2023-11-30 PROCEDURE — 84153 ASSAY OF PSA TOTAL: CPT

## 2023-11-30 PROCEDURE — 85025 COMPLETE CBC W/AUTO DIFF WBC: CPT

## 2023-11-30 PROCEDURE — 84443 ASSAY THYROID STIM HORMONE: CPT

## 2023-11-30 PROCEDURE — 82306 VITAMIN D 25 HYDROXY: CPT

## 2023-11-30 PROCEDURE — 82043 UR ALBUMIN QUANTITATIVE: CPT

## 2023-11-30 PROCEDURE — 80061 LIPID PANEL: CPT

## 2023-11-30 PROCEDURE — 36415 COLL VENOUS BLD VENIPUNCTURE: CPT

## 2023-11-30 PROCEDURE — 80053 COMPREHEN METABOLIC PANEL: CPT

## 2023-11-30 PROCEDURE — 83036 HEMOGLOBIN GLYCOSYLATED A1C: CPT

## 2023-12-04 ENCOUNTER — TELEPHONE (OUTPATIENT)
Dept: FAMILY MEDICINE | Facility: CLINIC | Age: 73
End: 2023-12-04
Payer: MEDICARE

## 2023-12-04 NOTE — TELEPHONE ENCOUNTER
----- Message from Nathaly Pepper sent at 12/4/2023  1:12 PM CST -----  Regarding: high sugar  Can a nurse give Mr Cuba a call he did his blood work and his sugar is high 385 please give him a call [phone]      Thanks

## 2023-12-07 ENCOUNTER — CLINICAL SUPPORT (OUTPATIENT)
Dept: FAMILY MEDICINE | Facility: CLINIC | Age: 73
End: 2023-12-07
Payer: MEDICARE

## 2023-12-07 ENCOUNTER — OFFICE VISIT (OUTPATIENT)
Dept: FAMILY MEDICINE | Facility: CLINIC | Age: 73
End: 2023-12-07
Payer: MEDICARE

## 2023-12-07 VITALS
RESPIRATION RATE: 20 BRPM | BODY MASS INDEX: 31.89 KG/M2 | OXYGEN SATURATION: 97 % | HEART RATE: 70 BPM | DIASTOLIC BLOOD PRESSURE: 85 MMHG | SYSTOLIC BLOOD PRESSURE: 134 MMHG | HEIGHT: 65 IN | WEIGHT: 191.38 LBS | TEMPERATURE: 98 F

## 2023-12-07 DIAGNOSIS — E20.9 HYPOPARATHYROIDISM, UNSPECIFIED HYPOPARATHYROIDISM TYPE: ICD-10-CM

## 2023-12-07 DIAGNOSIS — E78.00 HYPERCHOLESTEROLEMIA: ICD-10-CM

## 2023-12-07 DIAGNOSIS — K21.9 GASTROESOPHAGEAL REFLUX DISEASE, UNSPECIFIED WHETHER ESOPHAGITIS PRESENT: ICD-10-CM

## 2023-12-07 DIAGNOSIS — E11.65 TYPE 2 DIABETES MELLITUS WITH HYPERGLYCEMIA, WITHOUT LONG-TERM CURRENT USE OF INSULIN: ICD-10-CM

## 2023-12-07 DIAGNOSIS — I10 PRIMARY HYPERTENSION: ICD-10-CM

## 2023-12-07 DIAGNOSIS — Z12.11 COLON CANCER SCREENING: ICD-10-CM

## 2023-12-07 DIAGNOSIS — Z11.59 NEED FOR HEPATITIS C SCREENING TEST: ICD-10-CM

## 2023-12-07 DIAGNOSIS — E66.09 CLASS 1 OBESITY DUE TO EXCESS CALORIES WITH SERIOUS COMORBIDITY AND BODY MASS INDEX (BMI) OF 31.0 TO 31.9 IN ADULT: ICD-10-CM

## 2023-12-07 DIAGNOSIS — Z00.00 WELL ADULT EXAM: Primary | ICD-10-CM

## 2023-12-07 PROBLEM — E66.811 CLASS 1 OBESITY DUE TO EXCESS CALORIES WITH SERIOUS COMORBIDITY AND BODY MASS INDEX (BMI) OF 31.0 TO 31.9 IN ADULT: Status: ACTIVE | Noted: 2023-12-07

## 2023-12-07 PROCEDURE — 92228 IMG RTA DETC/MNTR DS PHY/QHP: CPT | Mod: TC,,, | Performed by: FAMILY MEDICINE

## 2023-12-07 PROCEDURE — 92228 IMG RTA DETC/MNTR DS PHY/QHP: CPT | Mod: 26,,, | Performed by: OPHTHALMOLOGY

## 2023-12-07 PROCEDURE — G0439 PPPS, SUBSEQ VISIT: HCPCS | Mod: ,,,

## 2023-12-07 PROCEDURE — G0439 PR MEDICARE ANNUAL WELLNESS SUBSEQUENT VISIT: ICD-10-PCS | Mod: ,,,

## 2023-12-07 PROCEDURE — 92228 DIABETIC EYE SCREENING PHOTO: ICD-10-PCS | Mod: 26,,, | Performed by: OPHTHALMOLOGY

## 2023-12-07 PROCEDURE — 92228 PR REMOTE IMAGE RETINA, MONITOR/MANAGE ACTIVE DISEASE: ICD-10-PCS | Mod: TC,,, | Performed by: FAMILY MEDICINE

## 2023-12-07 RX ORDER — METFORMIN HYDROCHLORIDE 500 MG/1
1000 TABLET, EXTENDED RELEASE ORAL 2 TIMES DAILY WITH MEALS
Qty: 360 TABLET | Refills: 1 | Status: SHIPPED | OUTPATIENT
Start: 2023-12-07 | End: 2024-03-12

## 2023-12-07 RX ORDER — SEMAGLUTIDE 0.68 MG/ML
0.5 INJECTION, SOLUTION SUBCUTANEOUS
Qty: 3 ML | Refills: 2 | Status: SHIPPED | OUTPATIENT
Start: 2023-12-07 | End: 2024-03-12 | Stop reason: SDUPTHER

## 2023-12-07 RX ORDER — FUROSEMIDE 20 MG/1
20 TABLET ORAL DAILY
Qty: 90 TABLET | Refills: 1 | Status: SHIPPED | OUTPATIENT
Start: 2023-12-07 | End: 2024-06-04

## 2023-12-07 RX ORDER — VALSARTAN 320 MG/1
320 TABLET ORAL DAILY
Qty: 90 TABLET | Refills: 1 | Status: SHIPPED | OUTPATIENT
Start: 2023-12-07 | End: 2024-06-04

## 2023-12-07 RX ORDER — ATORVASTATIN CALCIUM 10 MG/1
10 TABLET, FILM COATED ORAL NIGHTLY
Qty: 90 TABLET | Refills: 3 | Status: SHIPPED | OUTPATIENT
Start: 2023-12-07 | End: 2024-12-06

## 2023-12-07 RX ORDER — EZETIMIBE 10 MG/1
10 TABLET ORAL NIGHTLY
Qty: 90 TABLET | Refills: 3 | Status: SHIPPED | OUTPATIENT
Start: 2023-12-07 | End: 2024-12-06

## 2023-12-07 RX ORDER — OMEPRAZOLE 20 MG/1
20 CAPSULE, DELAYED RELEASE ORAL EVERY MORNING
Qty: 90 CAPSULE | Refills: 1 | Status: SHIPPED | OUTPATIENT
Start: 2023-12-07 | End: 2024-06-04

## 2023-12-07 NOTE — PROGRESS NOTES
Scooter Donato is a 73 y.o. male here for a diabetic eye screening with non-dilated fundus photos per unknown .    Patient cooperative?: Yes  Small pupils?: Yes  Last eye exam: unknown    For exam results, see Encounter Report.

## 2023-12-07 NOTE — PROGRESS NOTES
Patient ID: [id number]     Chief Complaint: Medicare Annual Wellness     HPI:     Scooter Donato is a 73 y.o. male here today for a Medicare Annual Wellness visit and comprehensive Health Risk Assessment.     A separate E/M code has been provided to evaluate additional complaints that the patient would like addressed during the dedicated Medicare Wellness Exam.    Health Maintenance   Topic Date Due    Hepatitis C Screening  Never done    Foot Exam  Never done    TETANUS VACCINE  Never done    Shingles Vaccine (1 of 2) Never done    Colorectal Cancer Screening  12/05/2023    Hemoglobin A1c  02/29/2024    Lipid Panel  11/30/2024    Low Dose Statin  12/07/2024    Eye Exam  12/07/2024        Past Medical History:   Diagnosis Date    Allergic rhinitis     Anxiety disorder, unspecified     Asthma     Asthmatic bronchitis     Atherosclerosis of aorta     Chronic inflammatory arthritis     Claudication     Diabetic neuropathy     Dysphagia     Family history of aortic aneurysm     GERD (gastroesophageal reflux disease)     Heart murmur     HLD (hyperlipidemia)     HTN (hypertension)     Hypercalcemia     Hyperparathyroidism     Hypoparathyroidism, unspecified     Lower extremity edema     Male erectile dysfunction, unspecified     Multiple actinic keratoses     Nonrheumatic mitral (valve) insufficiency     Osteoarthritis     Personal history of colonic polyps 2017    Tubular adenoma 2017    Type 2 diabetes mellitus     Vitamin D deficiency         Past Surgical History:   Procedure Laterality Date    APPENDECTOMY      COLONOSCOPY  12/05/2022    Dr Himanshu Prajapati - [place], [place]    CYSTOSCOPY  2012    TONSILLECTOMY          Social History     Socioeconomic History    Marital status:    Tobacco Use    Smoking status: Never    Smokeless tobacco: Never   Substance and Sexual Activity    Alcohol use: Yes    Drug use: Never    Sexual activity: Not Currently     Partners: Female     Social Determinants of Health     Financial  Resource Strain: Low Risk  (5/31/2023)    Overall Financial Resource Strain (CARDIA)     Difficulty of Paying Living Expenses: Not hard at all   Food Insecurity: No Food Insecurity (5/31/2023)    Hunger Vital Sign     Worried About Running Out of Food in the Last Year: Never true     Ran Out of Food in the Last Year: Never true   Transportation Needs: No Transportation Needs (5/31/2023)    PRAPARE - Transportation     Lack of Transportation (Medical): No     Lack of Transportation (Non-Medical): No   Physical Activity: Sufficiently Active (5/31/2023)    Exercise Vital Sign     Days of Exercise per Week: 6 days     Minutes of Exercise per Session: 30 min   Stress: No Stress Concern Present (5/31/2023)    [employer] - Occupational Stress Questionnaire     Feeling of Stress : Only a little   Social Connections: Socially Integrated (5/31/2023)    Social Connection and Isolation Panel [NHANES]     Frequency of Communication with Friends and Family: More than three times a week     Frequency of Social Gatherings with Friends and Family: More than three times a week     Attends [employer] Services: More than 4 times per year     Active Member of Clubs or Organizations: Yes     Attends Club or Organization Meetings: More than 4 times per year     Marital Status:    Housing Stability: Low Risk  (5/31/2023)    Housing Stability Vital Sign     Unable to Pay for Housing in the Last Year: No     Number of Places Lived in the Last Year: 2     Unstable Housing in the Last Year: No        Family History   Problem Relation Age of Onset    Lung cancer Mother     Hypertension Mother     Dementia Father     Kidney disease Father     Hypertension Father     Diabetes Maternal Grandmother     Diabetes Paternal Grandmother         Current Outpatient Medications   Medication Instructions    albuterol (PROVENTIL/VENTOLIN HFA) 90 mcg/actuation inhaler INHALE 2 PUFFS BY MOUTH EVERY 4 - 6 HOURS AS NEEDED FOR  "WHEEZING    atorvastatin (LIPITOR) 10 mg, Oral, Nightly    ezetimibe (ZETIA) 10 mg, Oral, Nightly    fluticasone-salmeterol 100-50 mcg/dose (ADVAIR DISKUS) 100-50 mcg/dose diskus inhaler INHALE 1 DOSE BY MOUTH TWICE DAILY. RINSE MOUTH AFTER USE    furosemide (LASIX) 20 mg, Oral, Daily    metFORMIN (GLUCOPHAGE-XR) 1,000 mg, Oral, 2 times daily with meals    mometasone (NASONEX) 50 mcg/actuation nasal spray 2 sprays    mupirocin (BACTROBAN) 2 % ointment APPLY TO AFFECTED AREA 3 TIMES A DAY FOR 7 DAYS    omeprazole (PRILOSEC) 20 mg, Oral, Every morning    OZEMPIC 0.5 mg, Subcutaneous, Every 7 days    valsartan (DIOVAN) 320 mg, Oral, Daily       Review of patient's allergies indicates:  No Known Allergies     Immunization History   Administered Date(s) Administered    COVID-19, MRNA, LN-S, PF (Pfizer) (Purple Cap) 02/04/2021, 02/25/2021, 10/19/2021, 05/22/2022        Patient Care Team:  John Ford DO as PCP - General (Family Medicine)  [facility] -  Ulysses Hernandez MD as Consulting Provider (Rheumatology)  Lizz Britt as Consulting Provider (Internal Medicine)  Bam Sagastume MD as Consulting Provider (Dermatology)  Letha De Anda MD as Consulting Provider (Cardiology)  [facility] (Oncology)  Max Gonzalez MD as Resident (Cardiology)    Subjective:     Review of Systems    12 point review of systems conducted, negative except as stated in the history of present illness. See HPI for details.    Objective:     Visit Vitals  /85 (BP Location: Left arm, Patient Position: Sitting, BP Method: Large (Automatic))   Pulse 70   Temp 97.5 °F (36.4 °C)   Resp 20   Ht 5' 5" (1.651 m)   Wt 86.8 kg (191 lb 6.4 oz)   SpO2 97%   BMI 31.85 kg/m²       Physical Exam  Vitals and nursing note reviewed.   Constitutional:       General: He is not in acute distress.     Appearance: He is obese. He is not ill-appearing.   HENT:      Head: Normocephalic and atraumatic.     "  Mouth/Throat:      Mouth: Mucous membranes are moist.      Pharynx: Oropharynx is clear.   Eyes:      General: No scleral icterus.     Extraocular Movements: Extraocular movements intact.      Conjunctiva/sclera: Conjunctivae normal.      Pupils: Pupils are equal, round, and reactive to light.   Neck:      Vascular: No carotid bruit.   Cardiovascular:      Rate and Rhythm: Normal rate and regular rhythm.      Heart sounds: No murmur heard.     No friction rub. No gallop.   Pulmonary:      Effort: Pulmonary effort is normal. No respiratory distress.      Breath sounds: Normal breath sounds. No wheezing, rhonchi or rales.   Abdominal:      General: Abdomen is protuberant. Bowel sounds are normal. There is no distension.      Palpations: Abdomen is soft. There is no mass.      Tenderness: There is no abdominal tenderness.   Musculoskeletal:         General: Normal range of motion.      Cervical back: Normal range of motion and neck supple.   Skin:     General: Skin is warm and dry.   Neurological:      General: No focal deficit present.      Mental Status: He is alert.   Psychiatric:         Mood and Affect: Mood normal.         Labs Reviewed:     Chemistry:  Lab Results   Component Value Date     11/30/2023    K 4.6 11/30/2023    CHLORIDE 103 11/30/2023    BUN 20.0 11/30/2023    CREATININE 1.20 (H) 11/30/2023    EGFRNORACEVR >60 11/30/2023    GLUCOSE 345 (H) 11/30/2023    CALCIUM 9.4 11/30/2023    ALKPHOS 91 11/30/2023    LABPROT 7.4 11/30/2023    ALBUMIN 3.9 11/30/2023    AST 19 11/30/2023    ALT 28 11/30/2023    PHOS 3.3 08/16/2022    PVSDTGWL32IY 54.9 11/30/2023    TSH 1.252 11/30/2023    WAPRZT3FMUG 1.06 08/16/2022    PSA 2.54 11/30/2023        Lab Results   Component Value Date    HGBA1C 11.7 (H) 11/30/2023        Hematology:  Lab Results   Component Value Date    WBC 7.64 11/30/2023    HGB 14.9 11/30/2023    HCT 44.8 11/30/2023     11/30/2023       Lipid Panel:  Lab Results   Component Value Date     CHOL 109 11/30/2023    HDL 39 11/30/2023    LDL 53.00 11/30/2023    TRIG 83 11/30/2023    TOTALCHOLEST 3 11/30/2023        Urine:  Lab Results   Component Value Date    LILLIE 66.0 11/30/2023        Assessment:       ICD-10-CM ICD-9-CM   1. Well adult exam  Z00.00 V70.0   2. Primary hypertension  I10 401.9   3. Type 2 diabetes mellitus with hyperglycemia, without long-term current use of insulin  E11.65 250.00     790.29   4. Class 1 obesity due to excess calories with serious comorbidity and body mass index (BMI) of 31.0 to 31.9 in adult  E66.09 278.00    Z68.31 V85.31   5. Hypercholesterolemia  E78.00 272.0   6. Gastroesophageal reflux disease, unspecified whether esophagitis present  K21.9 530.81   7. Colon cancer screening  Z12.11 V76.51   8. Need for hepatitis C screening test  Z11.59 V73.89   9. Hypoparathyroidism, unspecified hypoparathyroidism type  E20.9 252.1        Plan:     1. Well adult exam    2. Primary hypertension  Assessment & Plan:  Well controlled.   Continue Valsartan 320 mg + Lasix 20 mg daily.   Low Sodium Diet (DASH Diet - Less than 2 grams of sodium per day).  Monitor blood pressure daily and log. Report consistent numbers greater than 140/90.  Maintain healthy weight with goal BMI <30. Exercise 30 minutes per day, 5 days per week.      Orders:  -     furosemide (LASIX) 20 MG tablet; Take 1 tablet (20 mg total) by mouth once daily.  Dispense: 90 tablet; Refill: 1  -     valsartan (DIOVAN) 320 MG tablet; Take 1 tablet (320 mg total) by mouth once daily.  Dispense: 90 tablet; Refill: 1    3. Type 2 diabetes mellitus with hyperglycemia, without long-term current use of insulin  Assessment & Plan:  Lab Results   Component Value Date    HGBA1C 11.7 (H) 11/30/2023    HGBA1C 6.8 05/17/2023    HGBA1C 6.8 (H) 10/19/2022    LDL 53.00 11/30/2023    CREATININE 1.20 (H) 11/30/2023      A1C is not controlled. Patient is not taking Metformin 1000 mg BID. Reeducated patient on medication dosages and  compliance.   Continue Metformin 1000 BID.  Start Ozempic 0.5 mg every 7 days.   Recheck A1C in three months.   Diabetes Education Referral ordered today.   Follow ADA Diet. Avoid soda, simple sweets, and limit rice/pasta/breads/starches (no more than 45-50 grams per meal).  Maintain healthy weight with goal BMI <30.  Exercise 5 times per week for 30 minutes per day.  Stressed importance of daily foot exams.  Stressed importance of annual dilated eye exam.          Orders:  -     Diabetic Eye Screening Photo; Future  -     semaglutide (OZEMPIC) 0.25 mg or 0.5 mg (2 mg/3 mL) pen injector; Inject 0.5 mg into the skin every 7 days.  Dispense: 3 mL; Refill: 2  -     Ambulatory referral/consult to Diabetes Education; Future; Expected date: 12/14/2023  -     metFORMIN (GLUCOPHAGE-XR) 500 MG ER 24hr tablet; Take 2 tablets (1,000 mg total) by mouth 2 (two) times daily with meals.  Dispense: 360 tablet; Refill: 1  -     Hemoglobin A1C; Future; Expected date: 03/07/2024  -     Comprehensive Metabolic Panel; Future; Expected date: 03/07/2024  -     Lipid Panel; Future; Expected date: 03/07/2024  -     Microalbumin/Creatinine Ratio, Urine; Future; Expected date: 03/07/2024    4. Class 1 obesity due to excess calories with serious comorbidity and body mass index (BMI) of 31.0 to 31.9 in adult  Assessment & Plan:  Body mass index is 31.85 kg/m².  Goal BMI <30.  Exercise 5 times a week for 30 minutes per day.  Avoid soda, simple sugars, excessive rice, potatoes or bread. Limit fast foods and fried foods.  Choose complex carbs in moderation (example: green vegetables, beans, oatmeal). Eat plenty of fresh fruits and vegetables with lean meats daily.  Do not skip meals. Eat a balanced portion size.  Avoid fad diets. Consider permanent healthy life style changes.         5. Hypercholesterolemia  Assessment & Plan:  Lab Results   Component Value Date    LDL 53.00 11/30/2023    TRIG 83 11/30/2023    HDL 39 11/30/2023    TOTALCHOLEST 3  11/30/2023     Stable.   Continue Atorvastatin 10 mg + Zetia 10 mg daily.   Stressed importance of dietary modifications. Follow a low cholesterol, low saturated fat diet with less that 200mg of cholesterol a day.  Avoid fried foods and high saturated fats (high saturated fats less than 7% of calories).  Add Flax Seed/Fish Oil supplements to diet. Increase dietary fiber.  Regular exercise can reduce LDL and raise HDL. Stressed importance of physical activity 5 times per week for 30 minutes per day.       Orders:  -     atorvastatin (LIPITOR) 10 MG tablet; Take 1 tablet (10 mg total) by mouth every evening.  Dispense: 90 tablet; Refill: 3  -     ezetimibe (ZETIA) 10 mg tablet; Take 1 tablet (10 mg total) by mouth every evening.  Dispense: 90 tablet; Refill: 3    6. Gastroesophageal reflux disease, unspecified whether esophagitis present  Assessment & Plan:  Stable.   Continue Omeprazole 20 mg daily.   Avoid spicy, acidic, fried foods and alcohol.  Eat 2-3 hours before going to bed.  Avoid tight clothing, chew food thoroughly.  Reduce caffeine intake, avoid soda.      Orders:  -     omeprazole (PRILOSEC) 20 MG capsule; Take 1 capsule (20 mg total) by mouth every morning.  Dispense: 90 capsule; Refill: 1    7. Colon cancer screening  -     Ambulatory referral/consult to Gastroenterology; Future; Expected date: 12/14/2023    8. Need for hepatitis C screening test  -     Hepatitis C Antibody; Future; Expected date: 03/07/2024    9. Hypoparathyroidism, unspecified hypoparathyroidism type  -     PTH, Intact; Future; Expected date: 12/08/2023      The following assessments were completed and reviewed. See completed screening forms and assessments within the Encounter Summary.  [x] Health Risk Assessment   [x] CVD Risk Factors - Reviewed  [x] Obesity/Physical Activity -  Encouraged daily 30 minute physical activity x 5 days per week.   [x] Home Safety/Living Situation  [x] CAGE  [x] Depression (PHQ) Screen  [x] Timed Get Up  and Go  [x] Whisper Test  [x] Cognitive Function/Impairment Screen  [x] Nutrition Screening  [x] ADL Screen  [x] Opioid Screen:  [x] Patient does not have a prescription for opioids.   [] Patient has a prescription for opioids but is at low risk for abuse.   [x] Substance Abuse Screen:   [x] Patient does not use substances.   [x] Advanced Care Planning:  Advance Care Planning   Date: 12/07/2023    Living Will  During this visit, I engaged the patient  in the voluntary advance care planning process.  The patient and I reviewed the role for advance directives and their purpose in directing future healthcare if the patient's unable to speak for him/herself.  At this point in time, the patient does have full decision-making capacity.  We discussed different extreme health states that he could experience, and reviewed what kind of medical care he would want in those situations.  The patient communicated that if he were comatose and had little chance of a meaningful recovery, he would not want machines/life-sustaining treatments used. I spent a total of 5 minutes engaging the patient in this advance care planning discussion.              Provided patient with a 5-10 year written screening schedule and personal prevention plan. Recommendations were developed using the USPSTF age appropriate recommendations. Education, counseling, and referrals were provided as needed. After Visit Summary printed and given to patient, which includes a list of additional screenings\tests needed.    Follow up in about 3 months (around 3/7/2024) for DM II. In addition to their scheduled follow up, the patient has also been instructed to follow up on as needed basis.     Future Appointments   Date Time Provider Department Center   3/7/2024  8:00 AM Lo Qureshi NP [facility]    12/10/2024  9:00 AM Lo Qureshi NP [facility]           Lo Qureshi NP

## 2023-12-08 NOTE — ASSESSMENT & PLAN NOTE

## 2023-12-08 NOTE — ASSESSMENT & PLAN NOTE
Stable.   Continue Omeprazole 20 mg daily.   Avoid spicy, acidic, fried foods and alcohol.  Eat 2-3 hours before going to bed.  Avoid tight clothing, chew food thoroughly.  Reduce caffeine intake, avoid soda.

## 2023-12-08 NOTE — ASSESSMENT & PLAN NOTE
Lab Results   Component Value Date    LDL 53.00 11/30/2023    TRIG 83 11/30/2023    HDL 39 11/30/2023    TOTALCHOLEST 3 11/30/2023     Stable.   Continue Atorvastatin 10 mg + Zetia 10 mg daily.   Stressed importance of dietary modifications. Follow a low cholesterol, low saturated fat diet with less that 200mg of cholesterol a day.  Avoid fried foods and high saturated fats (high saturated fats less than 7% of calories).  Add Flax Seed/Fish Oil supplements to diet. Increase dietary fiber.  Regular exercise can reduce LDL and raise HDL. Stressed importance of physical activity 5 times per week for 30 minutes per day.

## 2023-12-08 NOTE — ASSESSMENT & PLAN NOTE
Well controlled.   Continue Valsartan 320 mg + Lasix 20 mg daily.   Low Sodium Diet (DASH Diet - Less than 2 grams of sodium per day).  Monitor blood pressure daily and log. Report consistent numbers greater than 140/90.  Maintain healthy weight with goal BMI <30. Exercise 30 minutes per day, 5 days per week.

## 2023-12-08 NOTE — ASSESSMENT & PLAN NOTE
Lab Results   Component Value Date    HGBA1C 11.7 (H) 11/30/2023    HGBA1C 6.8 05/17/2023    HGBA1C 6.8 (H) 10/19/2022    LDL 53.00 11/30/2023    CREATININE 1.20 (H) 11/30/2023      A1C is not controlled. Patient is not taking Metformin 1000 mg BID. Reeducated patient on medication dosages and compliance.   Continue Metformin 1000 BID.  Start Ozempic 0.5 mg every 7 days.   Recheck A1C in three months.   Diabetes Education Referral ordered today.   Follow ADA Diet. Avoid soda, simple sweets, and limit rice/pasta/breads/starches (no more than 45-50 grams per meal).  Maintain healthy weight with goal BMI <30.  Exercise 5 times per week for 30 minutes per day.  Stressed importance of daily foot exams.  Stressed importance of annual dilated eye exam.

## 2023-12-14 ENCOUNTER — TELEPHONE (OUTPATIENT)
Dept: FAMILY MEDICINE | Facility: CLINIC | Age: 73
End: 2023-12-14
Payer: MEDICARE

## 2023-12-14 NOTE — TELEPHONE ENCOUNTER
----- Message from Lo Qureshi NP sent at 12/8/2023  8:16 AM CST -----  Eye exam shows no retinopathy. Repeat in 12 months. F/U with primary eye physician.

## 2023-12-28 ENCOUNTER — TELEPHONE (OUTPATIENT)
Dept: FAMILY MEDICINE | Facility: CLINIC | Age: 73
End: 2023-12-28
Payer: MEDICARE

## 2023-12-28 DIAGNOSIS — E11.65 TYPE 2 DIABETES MELLITUS WITH HYPERGLYCEMIA, WITHOUT LONG-TERM CURRENT USE OF INSULIN: Primary | ICD-10-CM

## 2023-12-28 RX ORDER — LANCETS
1 EACH MISCELLANEOUS DAILY
Qty: 50 EACH | Refills: 11 | Status: SHIPPED | OUTPATIENT
Start: 2023-12-28

## 2023-12-28 RX ORDER — INSULIN PUMP SYRINGE, 3 ML
EACH MISCELLANEOUS
Qty: 1 EACH | Refills: 0 | Status: SHIPPED | OUTPATIENT
Start: 2023-12-28 | End: 2024-12-27

## 2023-12-28 NOTE — TELEPHONE ENCOUNTER
----- Message from Nathaly Pepper sent at 12/28/2023  2:58 PM CST -----  Regarding: script  Mr Cuba needs a new glucose meter, strips and lancets, sent [facility] they having a hard time finding strips for the old one he has and it works off and on, please send script for a whole new kit       thanks

## 2024-01-31 DIAGNOSIS — J45.30 MILD PERSISTENT ASTHMA WITHOUT COMPLICATION: Primary | ICD-10-CM

## 2024-01-31 RX ORDER — FLUTICASONE FUROATE AND VILANTEROL 100; 25 UG/1; UG/1
1 POWDER RESPIRATORY (INHALATION) DAILY
Qty: 30 EACH | Refills: 2 | Status: SHIPPED | OUTPATIENT
Start: 2024-01-31 | End: 2024-04-30

## 2024-01-31 NOTE — PROGRESS NOTES
Will try switching patient to Breo 100-25mcg dose based on formulary switch for 2024. The 100 may not be covered but I would prefer him on it compared to the 200 given his asthma is only mild persistent asthma.

## 2024-03-07 ENCOUNTER — LAB VISIT (OUTPATIENT)
Dept: LAB | Facility: HOSPITAL | Age: 74
End: 2024-03-07
Payer: MEDICARE

## 2024-03-07 DIAGNOSIS — E11.65 TYPE 2 DIABETES MELLITUS WITH HYPERGLYCEMIA, WITHOUT LONG-TERM CURRENT USE OF INSULIN: ICD-10-CM

## 2024-03-07 DIAGNOSIS — E20.9 HYPOPARATHYROIDISM, UNSPECIFIED HYPOPARATHYROIDISM TYPE: ICD-10-CM

## 2024-03-07 DIAGNOSIS — Z11.59 NEED FOR HEPATITIS C SCREENING TEST: ICD-10-CM

## 2024-03-07 LAB
ALBUMIN SERPL-MCNC: 4 G/DL (ref 3.4–4.8)
ALBUMIN/GLOB SERPL: 1.3 RATIO (ref 1.1–2)
ALP SERPL-CCNC: 71 UNIT/L (ref 40–150)
ALT SERPL-CCNC: 22 UNIT/L (ref 0–55)
AST SERPL-CCNC: 19 UNIT/L (ref 5–34)
BILIRUB SERPL-MCNC: 0.8 MG/DL
BUN SERPL-MCNC: 22 MG/DL (ref 8.4–25.7)
CALCIUM SERPL-MCNC: 9.1 MG/DL (ref 8.8–10)
CHLORIDE SERPL-SCNC: 108 MMOL/L (ref 98–107)
CHOLEST SERPL-MCNC: 93 MG/DL
CHOLEST/HDLC SERPL: 3 {RATIO} (ref 0–5)
CO2 SERPL-SCNC: 26 MMOL/L (ref 23–31)
CREAT SERPL-MCNC: 0.9 MG/DL (ref 0.73–1.18)
CREAT UR-MCNC: 60.6 MG/DL (ref 63–166)
EST. AVERAGE GLUCOSE BLD GHB EST-MCNC: 148.5 MG/DL
GFR SERPLBLD CREATININE-BSD FMLA CKD-EPI: >60 MLS/MIN/1.73/M2
GLOBULIN SER-MCNC: 3.1 GM/DL (ref 2.4–3.5)
GLUCOSE SERPL-MCNC: 152 MG/DL (ref 82–115)
HBA1C MFR BLD: 6.8 %
HCV AB SERPL QL IA: NONREACTIVE
HDLC SERPL-MCNC: 36 MG/DL (ref 35–60)
LDLC SERPL CALC-MCNC: 48 MG/DL (ref 50–140)
MICROALBUMIN UR-MCNC: 6 UG/ML
MICROALBUMIN/CREAT RATIO PNL UR: 9.9 MG/GM CR (ref 0–30)
POTASSIUM SERPL-SCNC: 4.3 MMOL/L (ref 3.5–5.1)
PROT SERPL-MCNC: 7.1 GM/DL (ref 5.8–7.6)
PTH-INTACT SERPL-MCNC: 68.2 PG/ML (ref 8.7–77)
SODIUM SERPL-SCNC: 142 MMOL/L (ref 136–145)
TRIGL SERPL-MCNC: 46 MG/DL (ref 34–140)
VLDLC SERPL CALC-MCNC: 9 MG/DL

## 2024-03-07 PROCEDURE — 80053 COMPREHEN METABOLIC PANEL: CPT

## 2024-03-07 PROCEDURE — 83036 HEMOGLOBIN GLYCOSYLATED A1C: CPT

## 2024-03-07 PROCEDURE — 36415 COLL VENOUS BLD VENIPUNCTURE: CPT

## 2024-03-07 PROCEDURE — 80061 LIPID PANEL: CPT

## 2024-03-07 PROCEDURE — 83970 ASSAY OF PARATHORMONE: CPT

## 2024-03-07 PROCEDURE — 86803 HEPATITIS C AB TEST: CPT

## 2024-03-07 PROCEDURE — 82043 UR ALBUMIN QUANTITATIVE: CPT

## 2024-03-12 ENCOUNTER — OFFICE VISIT (OUTPATIENT)
Dept: FAMILY MEDICINE | Facility: CLINIC | Age: 74
End: 2024-03-12
Payer: MEDICARE

## 2024-03-12 VITALS
HEART RATE: 76 BPM | TEMPERATURE: 98 F | OXYGEN SATURATION: 98 % | BODY MASS INDEX: 29.66 KG/M2 | HEIGHT: 65 IN | RESPIRATION RATE: 18 BRPM | WEIGHT: 178 LBS | DIASTOLIC BLOOD PRESSURE: 73 MMHG | SYSTOLIC BLOOD PRESSURE: 125 MMHG

## 2024-03-12 DIAGNOSIS — E11.65 TYPE 2 DIABETES MELLITUS WITH HYPERGLYCEMIA, WITHOUT LONG-TERM CURRENT USE OF INSULIN: Primary | Chronic | ICD-10-CM

## 2024-03-12 DIAGNOSIS — I70.0 ATHEROSCLEROSIS OF AORTA: ICD-10-CM

## 2024-03-12 PROCEDURE — 99214 OFFICE O/P EST MOD 30 MIN: CPT | Mod: ,,, | Performed by: FAMILY MEDICINE

## 2024-03-12 RX ORDER — SEMAGLUTIDE 0.68 MG/ML
0.5 INJECTION, SOLUTION SUBCUTANEOUS
Qty: 3 ML | Refills: 5 | Status: SHIPPED | OUTPATIENT
Start: 2024-03-12 | End: 2024-09-08

## 2024-03-12 RX ORDER — METFORMIN HYDROCHLORIDE 500 MG/1
500 TABLET, EXTENDED RELEASE ORAL 2 TIMES DAILY WITH MEALS
Qty: 180 TABLET | Refills: 1 | Status: SHIPPED | OUTPATIENT
Start: 2024-03-12 | End: 2024-06-17

## 2024-03-12 NOTE — PROGRESS NOTES
Patient ID: [id number]     Chief Complaint: Follow-up and Diabetes    HPI:     Scooter Donato is a 73 y.o. male here today for Follow-up and Diabetes. Doing well overall. Did not tolerate the increase in Metformin to 1000mg BID. Has been taking it 500mg BID. Experiencing constipation. A1C decreased to 6.8%.     ----------------------------  Allergic rhinitis  Anxiety disorder, unspecified  Asthma  Asthmatic bronchitis  Atherosclerosis of aorta  Chronic inflammatory arthritis  Claudication  Diabetic neuropathy  Dysphagia  Family history of aortic aneurysm  GERD (gastroesophageal reflux disease)  Heart murmur  HLD (hyperlipidemia)  HTN (hypertension)  Hypercalcemia  Hyperparathyroidism  Hypoparathyroidism, unspecified  Lower extremity edema  Male erectile dysfunction, unspecified  Multiple actinic keratoses  Nonrheumatic mitral (valve) insufficiency  Osteoarthritis  Personal history of colonic polyps  Tubular adenoma  Type 2 diabetes mellitus  Vitamin D deficiency     Past Surgical History:   Procedure Laterality Date    APPENDECTOMY      COLONOSCOPY  12/05/2022    Dr Himanshu Prajapati - [place], [place]    CYSTOSCOPY  2012    TONSILLECTOMY         Review of patient's allergies indicates:  No Known Allergies    Outpatient Medications Marked as Taking for the 3/12/24 encounter (Office Visit) with John Ford,    Medication Sig Dispense Refill    albuterol (PROVENTIL/VENTOLIN HFA) 90 mcg/actuation inhaler INHALE 2 PUFFS BY MOUTH EVERY 4 - 6 HOURS AS NEEDED FOR WHEEZING 18 g 11    atorvastatin (LIPITOR) 10 MG tablet Take 1 tablet (10 mg total) by mouth every evening. 90 tablet 3    blood sugar diagnostic Strp 1 each by Misc.(Non-Drug; Combo Route) route Daily. 50 each 11    blood-glucose meter kit Use as instructed 1 each 0    ezetimibe (ZETIA) 10 mg tablet Take 1 tablet (10 mg total) by mouth every evening. 90 tablet 3    fluticasone furoate-vilanteroL (BREO ELLIPTA) 100-25 mcg/dose diskus inhaler Inhale 1 puff into the  lungs once daily. Controller 30 each 2    furosemide (LASIX) 20 MG tablet Take 1 tablet (20 mg total) by mouth once daily. 90 tablet 1    lancets (LANCETS,THIN) Misc 1 each by Misc.(Non-Drug; Combo Route) route Daily. 50 each 11    mometasone (NASONEX) 50 mcg/actuation nasal spray USE 2 SPRAYS BY NASAL ROUTE ONCE DAILY 17 each 11    mupirocin (BACTROBAN) 2 % ointment APPLY TO AFFECTED AREA 3 TIMES A DAY FOR 7 DAYS      omeprazole (PRILOSEC) 20 MG capsule Take 1 capsule (20 mg total) by mouth every morning. 90 capsule 1    valsartan (DIOVAN) 320 MG tablet Take 1 tablet (320 mg total) by mouth once daily. 90 tablet 1    [DISCONTINUED] metFORMIN (GLUCOPHAGE-XR) 500 MG ER 24hr tablet Take 2 tablets (1,000 mg total) by mouth 2 (two) times daily with meals. (Patient taking differently: Take 500 mg by mouth 2 (two) times daily with meals.) 360 tablet 1       Social History     Socioeconomic History    Marital status:    Tobacco Use    Smoking status: Never    Smokeless tobacco: Never   Substance and Sexual Activity    Alcohol use: Yes    Drug use: Never    Sexual activity: Not Currently     Partners: Female     Social Determinants of Health     Financial Resource Strain: Low Risk  (5/31/2023)    Overall Financial Resource Strain (CARDIA)     Difficulty of Paying Living Expenses: Not hard at all   Food Insecurity: No Food Insecurity (5/31/2023)    Hunger Vital Sign     Worried About Running Out of Food in the Last Year: Never true     Ran Out of Food in the Last Year: Never true   Transportation Needs: No Transportation Needs (5/31/2023)    PRAPARE - Transportation     Lack of Transportation (Medical): No     Lack of Transportation (Non-Medical): No   Physical Activity: Sufficiently Active (5/31/2023)    Exercise Vital Sign     Days of Exercise per Week: 6 days     Minutes of Exercise per Session: 30 min   Stress: No Stress Concern Present (5/31/2023)    [employer] [place] [employer] - Occupational Stress  Questionnaire     Feeling of Stress : Only a little   Social Connections: Socially Integrated (5/31/2023)    Social Connection and Isolation Panel [NHANES]     Frequency of Communication with Friends and Family: More than three times a week     Frequency of Social Gatherings with Friends and Family: More than three times a week     Attends Nondenominational Services: More than 4 times per year     Active Member of Clubs or Organizations: Yes     Attends Club or Organization Meetings: More than 4 times per year     Marital Status:    Housing Stability: Low Risk  (5/31/2023)    Housing Stability Vital Sign     Unable to Pay for Housing in the Last Year: No     Number of Places Lived in the Last Year: 2     Unstable Housing in the Last Year: No        Family History   Problem Relation Age of Onset    Lung cancer Mother     Hypertension Mother     Dementia Father     Kidney disease Father     Hypertension Father     Diabetes Maternal Grandmother     Diabetes Paternal Grandmother         Patient Care Team:  John Ford DO as PCP - General (Family Medicine)  [facility] -  Ulysses Hernandez MD as Consulting Provider (Rheumatology)  Lizz Britt as Consulting Provider (Internal Medicine)  Bam Sagastume MD as Consulting Provider (Dermatology)  Letha De Anda MD as Consulting Provider (Cardiology)  [facility] (Oncology)  Max Gonzalez MD as Resident (Cardiology)     Subjective:     Review of Systems   Constitutional:  Negative for chills and fever.   Respiratory:  Negative for shortness of breath.    Cardiovascular:  Negative for chest pain.   Gastrointestinal:  Positive for constipation. Negative for diarrhea.   Neurological:  Negative for dizziness and headaches.   Psychiatric/Behavioral:  The patient does not have insomnia.      See HPI for details  All Other ROS: Negative except as stated in HPI.     Objective:     /73   Pulse 76   Temp 98 °F (36.7  "°C) (Temporal)   Resp 18   Ht 5' 4.96" (1.65 m)   Wt 80.7 kg (178 lb)   SpO2 98%   BMI 29.66 kg/m²     Physical Exam  Vitals reviewed.   Constitutional:       General: He is not in acute distress.     Appearance: Normal appearance. He is not ill-appearing.   Cardiovascular:      Rate and Rhythm: Normal rate and regular rhythm.      Pulses: Normal pulses.      Heart sounds: Normal heart sounds. No murmur heard.     No friction rub. No gallop.   Pulmonary:      Effort: No respiratory distress.      Breath sounds: No wheezing, rhonchi or rales.   Musculoskeletal:         General: No swelling.      Right lower leg: No edema.      Left lower leg: No edema.   Skin:     General: Skin is warm and dry.   Neurological:      General: No focal deficit present.      Mental Status: He is alert.   Psychiatric:         Mood and Affect: Mood normal.         Behavior: Behavior normal.         Assessment/Plan:     1. Type 2 diabetes mellitus with hyperglycemia, without long-term current use of insulin  -     metFORMIN (GLUCOPHAGE-XR) 500 MG ER 24hr tablet; Take 1 tablet (500 mg total) by mouth 2 (two) times daily with meals.  Dispense: 180 tablet; Refill: 1  -     semaglutide (OZEMPIC) 0.25 mg or 0.5 mg (2 mg/3 mL) pen injector; Inject 0.5 mg into the skin every 7 days.  Dispense: 3 mL; Refill: 5  -     Hemoglobin A1C; Future; Expected date: 09/12/2024  -     Comprehensive Metabolic Panel; Future; Expected date: 09/12/2024    2. Atherosclerosis of aorta      On atorvastatin and Zetia. Followed by Cardiology.   Follow up:     Follow up in about 6 months (around 9/12/2024) for Follow up diabetes. In addition to their scheduled follow up, the patient has also been instructed to follow up on as needed basis.     "

## 2024-04-30 LAB
LEFT EYE DM RETINOPATHY: NEGATIVE
RIGHT EYE DM RETINOPATHY: NEGATIVE

## 2024-05-27 DIAGNOSIS — I10 PRIMARY HYPERTENSION: ICD-10-CM

## 2024-05-27 DIAGNOSIS — K21.9 GASTROESOPHAGEAL REFLUX DISEASE, UNSPECIFIED WHETHER ESOPHAGITIS PRESENT: ICD-10-CM

## 2024-05-28 RX ORDER — FUROSEMIDE 20 MG/1
20 TABLET ORAL
Qty: 90 TABLET | Refills: 1 | Status: SHIPPED | OUTPATIENT
Start: 2024-05-28

## 2024-05-28 RX ORDER — OMEPRAZOLE 20 MG/1
20 CAPSULE, DELAYED RELEASE ORAL EVERY MORNING
Qty: 90 CAPSULE | Refills: 1 | Status: SHIPPED | OUTPATIENT
Start: 2024-05-28

## 2024-05-29 ENCOUNTER — TELEPHONE (OUTPATIENT)
Dept: FAMILY MEDICINE | Facility: CLINIC | Age: 74
End: 2024-05-29
Payer: MEDICARE

## 2024-05-29 NOTE — TELEPHONE ENCOUNTER
Lo Qureshi, Tina Tyler LPN  Caller: Unspecified (Today,  3:57 PM)  He can discontinue Zetia if it's costing him too much.  His cholesterol is within normal ranges.

## 2024-05-29 NOTE — TELEPHONE ENCOUNTER
----- Message from Kayla Stringer sent at 5/29/2024 10:00 AM CDT -----  Patient called wanting to talk to Dr. LEWIS - he is taking 2 cholesterol meds.. He is wondering if that's ok, he is on atorvastatin which insurance covers, but [place]zetimibe that costs him $112 - he is wondering if he still needs to take ezetimibe.  He would like a call back

## 2024-06-14 DIAGNOSIS — E11.65 TYPE 2 DIABETES MELLITUS WITH HYPERGLYCEMIA, WITHOUT LONG-TERM CURRENT USE OF INSULIN: Chronic | ICD-10-CM

## 2024-06-17 RX ORDER — METFORMIN HYDROCHLORIDE 500 MG/1
1000 TABLET, EXTENDED RELEASE ORAL 2 TIMES DAILY WITH MEALS
Qty: 360 TABLET | Refills: 1 | Status: SHIPPED | OUTPATIENT
Start: 2024-06-17

## 2024-06-24 ENCOUNTER — LAB VISIT (OUTPATIENT)
Dept: LAB | Facility: HOSPITAL | Age: 74
End: 2024-06-24
Attending: STUDENT IN AN ORGANIZED HEALTH CARE EDUCATION/TRAINING PROGRAM
Payer: MEDICARE

## 2024-06-24 DIAGNOSIS — E78.5 HYPERLIPIDEMIA, UNSPECIFIED HYPERLIPIDEMIA TYPE: Primary | ICD-10-CM

## 2024-06-24 LAB
CHOLEST SERPL-MCNC: 115 MG/DL
CHOLEST/HDLC SERPL: 3 {RATIO} (ref 0–5)
HDLC SERPL-MCNC: 42 MG/DL (ref 35–60)
LDLC SERPL CALC-MCNC: 61 MG/DL (ref 50–140)
TRIGL SERPL-MCNC: 58 MG/DL (ref 34–140)
VLDLC SERPL CALC-MCNC: 12 MG/DL

## 2024-06-24 PROCEDURE — 36415 COLL VENOUS BLD VENIPUNCTURE: CPT

## 2024-06-24 PROCEDURE — 80061 LIPID PANEL: CPT

## 2024-07-22 DIAGNOSIS — J45.30 MILD PERSISTENT ASTHMA WITHOUT COMPLICATION: ICD-10-CM

## 2024-07-22 RX ORDER — ALBUTEROL SULFATE 90 UG/1
AEROSOL, METERED RESPIRATORY (INHALATION)
Qty: 18 G | Refills: 11 | Status: SHIPPED | OUTPATIENT
Start: 2024-07-22

## 2024-07-31 ENCOUNTER — OFFICE VISIT (OUTPATIENT)
Dept: FAMILY MEDICINE | Facility: CLINIC | Age: 74
End: 2024-07-31
Payer: MEDICARE

## 2024-07-31 ENCOUNTER — LAB VISIT (OUTPATIENT)
Dept: LAB | Facility: HOSPITAL | Age: 74
End: 2024-07-31
Attending: FAMILY MEDICINE
Payer: MEDICARE

## 2024-07-31 VITALS
TEMPERATURE: 98 F | HEIGHT: 64 IN | WEIGHT: 179 LBS | RESPIRATION RATE: 18 BRPM | OXYGEN SATURATION: 98 % | SYSTOLIC BLOOD PRESSURE: 134 MMHG | HEART RATE: 70 BPM | BODY MASS INDEX: 30.56 KG/M2 | DIASTOLIC BLOOD PRESSURE: 76 MMHG

## 2024-07-31 DIAGNOSIS — T50.905A DRUG-INDUCED WEIGHT LOSS: ICD-10-CM

## 2024-07-31 DIAGNOSIS — R63.4 DRUG-INDUCED WEIGHT LOSS: ICD-10-CM

## 2024-07-31 DIAGNOSIS — E11.649 TYPE 2 DIABETES MELLITUS WITH HYPOGLYCEMIA WITHOUT COMA, WITHOUT LONG-TERM CURRENT USE OF INSULIN: Primary | ICD-10-CM

## 2024-07-31 DIAGNOSIS — E11.649 TYPE 2 DIABETES MELLITUS WITH HYPOGLYCEMIA WITHOUT COMA, WITHOUT LONG-TERM CURRENT USE OF INSULIN: ICD-10-CM

## 2024-07-31 LAB
ALBUMIN SERPL-MCNC: 3.9 G/DL (ref 3.4–4.8)
ALBUMIN/GLOB SERPL: 1.4 RATIO (ref 1.1–2)
ALP SERPL-CCNC: 63 UNIT/L (ref 40–150)
ALT SERPL-CCNC: 19 UNIT/L (ref 0–55)
ANION GAP SERPL CALC-SCNC: 8 MEQ/L
AST SERPL-CCNC: 17 UNIT/L (ref 5–34)
BILIRUB SERPL-MCNC: 0.7 MG/DL
BUN SERPL-MCNC: 19 MG/DL (ref 8.4–25.7)
CALCIUM SERPL-MCNC: 9.4 MG/DL (ref 8.8–10)
CHLORIDE SERPL-SCNC: 108 MMOL/L (ref 98–107)
CO2 SERPL-SCNC: 27 MMOL/L (ref 23–31)
CREAT SERPL-MCNC: 0.78 MG/DL (ref 0.73–1.18)
CREAT/UREA NIT SERPL: 24
EST. AVERAGE GLUCOSE BLD GHB EST-MCNC: 125.5 MG/DL
GFR SERPLBLD CREATININE-BSD FMLA CKD-EPI: >60 ML/MIN/1.73/M2
GLOBULIN SER-MCNC: 2.7 GM/DL (ref 2.4–3.5)
GLUCOSE SERPL-MCNC: 100 MG/DL (ref 82–115)
HBA1C MFR BLD: 6 %
POTASSIUM SERPL-SCNC: 4.1 MMOL/L (ref 3.5–5.1)
PROT SERPL-MCNC: 6.6 GM/DL (ref 5.8–7.6)
SODIUM SERPL-SCNC: 143 MMOL/L (ref 136–145)

## 2024-07-31 PROCEDURE — 83036 HEMOGLOBIN GLYCOSYLATED A1C: CPT

## 2024-07-31 PROCEDURE — 36415 COLL VENOUS BLD VENIPUNCTURE: CPT

## 2024-07-31 PROCEDURE — 99214 OFFICE O/P EST MOD 30 MIN: CPT | Mod: ,,, | Performed by: FAMILY MEDICINE

## 2024-07-31 PROCEDURE — 80053 COMPREHEN METABOLIC PANEL: CPT

## 2024-07-31 NOTE — PROGRESS NOTES
Patient ID: [id number]     Chief Complaint: Medication Management (Ozempic is costing patient) and Weight Loss (Patient experienced a weight loss)    HPI:     Scooter Donato is a 74 y.o. male here today for Medication Management (Ozempic is costing patient) and Weight Loss (Patient experienced a weight loss). Patient reports weight loss and difficulty gaining muscle. He reports hypoglycemia at home.     -------------------------------------    Allergic rhinitis    Anxiety disorder, unspecified    Asthma    Asthmatic bronchitis    Atherosclerosis of aorta    Chronic inflammatory arthritis    Claudication    Diabetic neuropathy    Dysphagia    Family history of aortic aneurysm    GERD (gastroesophageal reflux disease)    Heart murmur    HLD (hyperlipidemia)    HTN (hypertension)    Hypercalcemia    Hyperparathyroidism    Hypoparathyroidism, unspecified    Lower extremity edema    Male erectile dysfunction, unspecified    Multiple actinic keratoses    Nonrheumatic mitral (valve) insufficiency    Osteoarthritis    Personal history of colonic polyps    Tubular adenoma    Type 2 diabetes mellitus    Vitamin D deficiency        Past Surgical History:   Procedure Laterality Date    APPENDECTOMY      COLONOSCOPY  12/05/2022    Dr Himanshu Prajapati - [place], [place]    CYSTOSCOPY  2012    TONSILLECTOMY         Review of patient's allergies indicates:  No Known Allergies    Outpatient Medications Marked as Taking for the 7/31/24 encounter (Office Visit) with John Ford, DO   Medication Sig Dispense Refill    atorvastatin (LIPITOR) 10 MG tablet Take 1 tablet (10 mg total) by mouth every evening. 90 tablet 3    blood sugar diagnostic Strp 1 each by Misc.(Non-Drug; Combo Route) route Daily. 50 each 11    blood-glucose meter kit Use as instructed 1 each 0    ezetimibe (ZETIA) 10 mg tablet Take 1 tablet (10 mg total) by mouth every evening. 90 tablet 3    fluticasone furoate-vilanteroL (BREO ELLIPTA) 100-25 mcg/dose diskus inhaler  Inhale 1 puff into the lungs once daily. Controller 30 each 2    lancets (LANCETS,THIN) Misc 1 each by Misc.(Non-Drug; Combo Route) route Daily. 50 each 11    metFORMIN (GLUCOPHAGE-XR) 500 MG ER 24hr tablet TAKE 2 TABLETS BY MOUTH TWICE A DAY WITH MEALS 360 tablet 1    mometasone (NASONEX) 50 mcg/actuation nasal spray USE 2 SPRAYS BY NASAL ROUTE ONCE DAILY 17 each 11    omeprazole (PRILOSEC) 20 MG capsule TAKE 1 CAPSULE BY MOUTH EVERY DAY IN THE MORNING 90 capsule 1    semaglutide (OZEMPIC) 0.25 mg or 0.5 mg (2 mg/3 mL) pen injector Inject 0.5 mg into the skin every 7 days. 3 mL 5    valsartan (DIOVAN) 320 MG tablet Take 1 tablet (320 mg total) by mouth once daily. 90 tablet 1       Social History     Socioeconomic History    Marital status:    Tobacco Use    Smoking status: Never    Smokeless tobacco: Never   Substance and Sexual Activity    Alcohol use: Yes    Drug use: Never    Sexual activity: Not Currently     Partners: Female     Social Determinants of Health     Financial Resource Strain: Low Risk  (5/31/2023)    Overall Financial Resource Strain (CARDIA)     Difficulty of Paying Living Expenses: Not hard at all   Food Insecurity: No Food Insecurity (5/31/2023)    Hunger Vital Sign     Worried About Running Out of Food in the Last Year: Never true     Ran Out of Food in the Last Year: Never true   Transportation Needs: No Transportation Needs (5/31/2023)    PRAPARE - Transportation     Lack of Transportation (Medical): No     Lack of Transportation (Non-Medical): No   Physical Activity: Sufficiently Active (5/31/2023)    Exercise Vital Sign     Days of Exercise per Week: 6 days     Minutes of Exercise per Session: 30 min   Stress: No Stress Concern Present (5/31/2023)    [employer] - Occupational Stress Questionnaire     Feeling of Stress : Only a little   Housing Stability: Low Risk  (5/31/2023)    Housing Stability Vital Sign     Unable to Pay for Housing in the Last  "Year: No     Number of Places Lived in the Last Year: 2     Unstable Housing in the Last Year: No        Family History   Problem Relation Name Age of Onset    Lung cancer Mother      Hypertension Mother      Dementia Father      Kidney disease Father      Hypertension Father      Diabetes Maternal Grandmother      Diabetes Paternal Grandmother          Patient Care Team:  John Ford DO as PCP - General (Family Medicine)  [facility] -  Ulysses Hernandez MD as Consulting Provider (Rheumatology)  Lizz Britt as Consulting Provider (Internal Medicine)  Bam Sagastume MD as Consulting Provider (Dermatology)  Letha De Anda MD as Consulting Provider (Cardiology)  [facility] (Oncology)  Max Gonzalez MD as Resident (Cardiology)  [facility] (Optometry)     Subjective:     Review of Systems   Constitutional:  Negative for chills and fever.   Respiratory:  Negative for shortness of breath.    Cardiovascular:  Negative for chest pain.   Gastrointestinal:  Negative for constipation and diarrhea.   Neurological:  Negative for dizziness and headaches.   Psychiatric/Behavioral:  The patient does not have insomnia.        See HPI for details  All Other ROS: Negative except as stated in HPI.       Objective:     /76 (BP Location: Left arm, Patient Position: Sitting, BP Method: Large (Manual))   Pulse 70   Temp 97.9 °F (36.6 °C)   Resp 18   Ht 5' 4" (1.626 m)   Wt 81.2 kg (179 lb)   SpO2 98%   BMI 30.73 kg/m²     Physical Exam  Vitals reviewed.   Constitutional:       General: He is not in acute distress.     Appearance: Normal appearance. He is not ill-appearing.   Cardiovascular:      Rate and Rhythm: Normal rate and regular rhythm.      Pulses: Normal pulses.      Heart sounds: Normal heart sounds. No murmur heard.     No friction rub. No gallop.   Pulmonary:      Effort: No respiratory distress.      Breath sounds: No wheezing, rhonchi or rales. "   Musculoskeletal:         General: No swelling.      Right lower leg: No edema.      Left lower leg: No edema.   Skin:     General: Skin is warm and dry.   Neurological:      General: No focal deficit present.      Mental Status: He is alert.   Psychiatric:         Mood and Affect: Mood normal.         Behavior: Behavior normal.         Assessment/Plan:     1. Type 2 diabetes mellitus with hypoglycemia without coma, without long-term current use of insulin  -     Ambulatory referral/consult to Diabetes Education; Future; Expected date: 08/07/2024  -     Comprehensive Metabolic Panel; Future; Expected date: 07/31/2024  -     Hemoglobin A1C; Future; Expected date: 07/31/2024    2. Drug-induced weight loss    Suspect combination of Ozempic effect and better control of diabetes compared to December 2023. If weight loss begins to impact patient's quality of life further will investigate for other possible etiologies such as malignancy though my suspicion for this at this time is low given his use of Ozempic.       Follow up:     No follow-ups on file. In addition to their scheduled follow up, the patient has also been instructed to follow up on as needed basis.

## 2024-08-05 ENCOUNTER — TELEPHONE (OUTPATIENT)
Dept: FAMILY MEDICINE | Facility: CLINIC | Age: 74
End: 2024-08-05
Payer: MEDICARE

## 2024-08-07 ENCOUNTER — CLINICAL SUPPORT (OUTPATIENT)
Dept: DIABETES | Facility: CLINIC | Age: 74
End: 2024-08-07
Payer: MEDICARE

## 2024-08-07 VITALS — BODY MASS INDEX: 30.61 KG/M2 | WEIGHT: 178.31 LBS

## 2024-08-07 DIAGNOSIS — E11.649 TYPE 2 DIABETES MELLITUS WITH HYPOGLYCEMIA WITHOUT COMA, WITHOUT LONG-TERM CURRENT USE OF INSULIN: ICD-10-CM

## 2024-08-07 DIAGNOSIS — E11.65 TYPE 2 DIABETES MELLITUS WITH HYPERGLYCEMIA, WITHOUT LONG-TERM CURRENT USE OF INSULIN: Primary | Chronic | ICD-10-CM

## 2024-08-07 PROCEDURE — G0108 DIAB MANAGE TRN  PER INDIV: HCPCS | Mod: PBBFAC,PN | Performed by: DIETITIAN, REGISTERED

## 2024-08-07 PROCEDURE — 99212 OFFICE O/P EST SF 10 MIN: CPT | Mod: PBBFAC,PN | Performed by: DIETITIAN, REGISTERED

## 2024-08-08 ENCOUNTER — TELEPHONE (OUTPATIENT)
Dept: PHARMACY | Facility: CLINIC | Age: 74
End: 2024-08-08
Payer: MEDICARE

## 2024-08-12 ENCOUNTER — TELEPHONE (OUTPATIENT)
Dept: FAMILY MEDICINE | Facility: CLINIC | Age: 74
End: 2024-08-12
Payer: MEDICARE

## 2024-08-12 NOTE — TELEPHONE ENCOUNTER
attempted to contact patient left voicemail to have patient come by the office to show how to put on sensor

## 2024-08-13 DIAGNOSIS — E11.649 TYPE 2 DIABETES MELLITUS WITH HYPOGLYCEMIA WITHOUT COMA, WITHOUT LONG-TERM CURRENT USE OF INSULIN: Primary | ICD-10-CM

## 2024-08-13 DIAGNOSIS — E11.649 TYPE 2 DIABETES MELLITUS WITH HYPOGLYCEMIA WITHOUT COMA, WITHOUT LONG-TERM CURRENT USE OF INSULIN: ICD-10-CM

## 2024-08-13 RX ORDER — FLASH GLUCOSE SENSOR
1 KIT MISCELLANEOUS
Qty: 2 KIT | Refills: 5 | Status: SHIPPED | OUTPATIENT
Start: 2024-08-13 | End: 2024-08-13 | Stop reason: SDUPTHER

## 2024-08-13 RX ORDER — FLASH GLUCOSE SENSOR
1 KIT MISCELLANEOUS
Qty: 2 KIT | Refills: 5 | Status: SHIPPED | OUTPATIENT
Start: 2024-08-13

## 2024-08-21 DIAGNOSIS — E11.65 TYPE 2 DIABETES MELLITUS WITH HYPERGLYCEMIA, WITHOUT LONG-TERM CURRENT USE OF INSULIN: Chronic | ICD-10-CM

## 2024-08-21 RX ORDER — SEMAGLUTIDE 0.68 MG/ML
0.5 INJECTION, SOLUTION SUBCUTANEOUS
Qty: 3 ML | Refills: 5 | Status: SHIPPED | OUTPATIENT
Start: 2024-08-21 | End: 2025-02-17

## 2024-08-26 ENCOUNTER — CLINICAL SUPPORT (OUTPATIENT)
Dept: DIABETES | Facility: CLINIC | Age: 74
End: 2024-08-26
Payer: MEDICARE

## 2024-08-26 DIAGNOSIS — E11.65 TYPE 2 DIABETES MELLITUS WITH HYPERGLYCEMIA, WITHOUT LONG-TERM CURRENT USE OF INSULIN: Primary | Chronic | ICD-10-CM

## 2024-08-26 PROCEDURE — 99211 OFF/OP EST MAY X REQ PHY/QHP: CPT | Mod: PBBFAC,PN | Performed by: DIETITIAN, REGISTERED

## 2024-08-26 PROCEDURE — G0108 DIAB MANAGE TRN  PER INDIV: HCPCS | Mod: PBBFAC,PN | Performed by: DIETITIAN, REGISTERED

## 2024-08-26 NOTE — Clinical Note
Hi Dr Lopez,  I met with Mr Cuba today and he asked that I share his Freestyle report with you (full report is in media). We noticed a slight increase in his GMI (predicted A1C) to 6.5%. States he decreased Ozempic to .25mg (fear of wt loss) but will go back to taking .5 mg. Wt is stable today. He had a few readings below 70 but we suspect it could have been due to a compression error while sleeping on his sensor arm. He will monitor for symptoms of hypoglycemia. He is currently using a sample Freestyle Kristie 3 sensor. Insurance denied his Freestyle Rx I explained that it is likely due to non-insulin dependent or that it would need to go through a DME company. They will reach out to their insurance and if it needs to go to DME, I can send you the forms if you would want to proceed. I let them know that the Dexcom Stelo will be available without a prescription, if they want to go that route.  Thank you for this referral to diabetes education!  All the best, Jami Gallo Diabetes Care Specialist

## 2024-08-26 NOTE — PROGRESS NOTES
Diabetes Care Specialist Follow-up Note  Author: Jami Gallo RD  Date: 8/26/2024    Intake    Program Intake  Reason for Diabetes Program Visit:: Intervention  Type of Intervention:: Individual  Individual: Education  Education: Self-Management Skill Review  Current diabetes risk level:: low  In the last 12 months, have you:: none    Current Diabetes Treatment: Oral Medications, DM Injectables  Oral Medication Type/Dose: Metformin 500 mg ER, two tablets BIDAC  DM Injectables Type/Dose: Ozempic .5 mg    Continuous Glucose Monitoring  Patient has CGM: Yes  Personal CGM type:: Freestyle Kristie 3  GMI Date: 08/26/24  GMI Value: 6.5 %    Lab Results   Component Value Date    HGBA1C 6.0 07/31/2024     A1c Pre Diabetes Care Specialist Intervention:  6.0%      Weight: (P) 81.4 kg (179 lb 8 oz)       Body mass index is 30.81 kg/m² (pended).  Wt gain of 1 lbs since last visit on 8/7/24      Physical activity/Exercise:   N/a    SMBG: Freestyle Kristie 3 with phone stephen  Fasting, 104-144  Post prandial, 170-260      Name: Bereket Donato  YOB: 1976  Report Period: 08/13/2024 - 08/26/2024 (14 days)  Generated: 08/26/2024  % Time CGM Active: 84%      Glucose Statistics and Targets  Average Glucose: 133 mg/dL  Glucose Management Indicator (GMI): 6.5%  Glucose Variability (%CV): 19.2%  Target Range: 70 - 180 mg/dL      Time in Ranges  Very High: >250 mg/dL --- 0%  High: 181 - 250 mg/dL --- 5%  Target Range: 70 - 180 mg/dL --- 95%  Low: 54 - 69 mg/dL --- 0%  Very Low: <54 mg/dL --- 0%      Lifestyle Coping Support & Clinical    Lifestyle/Coping/Support  Does anyone in your family have diabetes or does anyone in your family support you in your diabetes care?: wife, children, grandchildren  Learning Barriers:: None  Psychosocial/Coping Skills Assessment Completed: : No  Deffered due to:: Time  Area of need?: Deferred         Diabetes Self-Management Skills Assessment    Medication Skills Assessment  Patient is able to  identify current diabetes medications, dosages, and appropriate timing of medications.: yes  Patient reports problems or concerns with current medication regimen.: yes  Medication regimen problems/concerns:: concerned about side effects (decreased Ozempic to .25 due to fear of wt loss)  Patient is  aware that some diabetes medications can cause low blood sugar?: Yes  Medication Skills Assessment Completed:: Yes  Assessment indicates:: Instruction Needed  Area of need?: Yes    Diabetes Disease Process/Treatment Options  Diabetes Disease Process/Treatment Options: Skills Assessment Completed: No  Deferred due to:: Time  Area of need?: Deferred    Nutrition/Healthy Eating  Nutrition/Healthy Eating Skills Assessment Completed:: No  Deffered due to:: Time  Area of need?: Deferred         Home Blood Glucose Monitoring  Patient states that blood sugar is checked at home daily.: yes  Personal CGM type:: Freestyle Kristie 3  What is your A1c Target?: <7  Home Blood Glucose Monitoring Skills Assessment Completed: : Yes  Area of need?: Yes    Acute Complications  Have you ever had hypoglycemia (low BG 70 or less)?: yes  How often and what are your symptoms?: sweating  How do you treat hypoglycemia?: honey and crackers  Have you ever had hyperglycemia (high  or more)?: no  Acute Complications Skills Assessment Completed: : Yes  Assessment indicates:: Adequate understanding  Area of need?: No    Chronic Complications  Deferred due to:: Time  Area of need?: Deferred      During today's follow-up visit,  the following areas required further assessment and content was provided/reviewed.    Based on today's diabetes care assessment, the following areas of need were identified:          8/26/2024    12:01 AM   Areas of Need   Medications/Current Diabetes Treatment Yes - see care plan   Lifestyle Coping Support Deferred   Diabetes Disease Process/Treatment Options Deferred   Nutrition/Healthy Eating Deferred   Home Blood Glucose  Monitoring Yes - see care plan   Acute Complications No   Chronic Complications Deferred        Today's interventions were provided through individual discussion, instruction, and written materials were provided.    Patient verbalized understanding of instruction and written materials.  Pt was able to return back demonstration of instructions today. Patient understood key points, needs reinforcement and further instruction.     Diabetes Self-Management Care Plan Review and Evaluation of Progress:    During today's follow-up Scooter's Diabetes Self-Management Care Plan progress was reviewed and progress was evaluated including his/her input. Scooter has agreed to continue his/her journey to improve/maintain overall diabetes control by continuing to set health goals. See care plan progress below.      Care Plan: Diabetes Management   Updates made since 7/27/2024 12:00 AM        Problem: Blood Glucose Self-Monitoring         Goal: Patient agrees to use PCP-provided CGM sample to monitor blood sugar from 10-14 days.    Start Date: 8/7/2024   Expected End Date: 9/30/2024   This Visit's Progress: On track   Priority: High   Barriers: No Barriers Identified   Note:    8/7/24 - Provided information for stephen download. Pt states his daughter will help him with starting the CGM sample and will reach out to DM ed for assistance, as needed. Pt and wife agree to return in 2-3 weeks for AGP review.    8/26/24 - pt currently using sample of Freestyle Kristie 3 with phone stephen. See media for full Libreview report. Insurance denied request for Freestyle Kristie 14 sent to pharmacy. Instructed that it may be due to non-insulin dependent or needing to go to [employer] company. Pt will will reach out to insurance for verification. Informed that the over-the-counter Dexcom Stelo will be available soon without prescription, if interested.        Task: Reviewed the importance of self-monitoring blood glucose and keeping logs. Completed 8/7/2024         Problem: Medications         Goal: Patient Agrees to take Diabetes Medication(s) Ozempic .5 and Metformin 500 mg ER BID as prescribed.    Start Date: 8/7/2024   Expected End Date: 8/26/2024   Priority: High   Barriers: No Barriers Identified   Note:    8/7/24 - reviewed including weekly strength training to manage side effects of wt loss and muscle loss. Pt states he does weights and stationary bike at home. Will refer to Pharmacy Assistance Program due to high out-of-pocket costs related to Medicare [place] Kar[place].    8/26/24 - printed information needed to complete pharmacy assistance application and provided contact phone #. Pt states that he decreased Ozempic to .25mg due to fear of wt loss and noticed and increase of GMI so will go back to .5 mg. Noted that his wt has been stable at 197#       Task: Reviewed with patient all current diabetes medications and provided basic review of the purpose, dosage, frequency, side effects, and storage of both oral and injectable diabetes medications. Completed 8/7/2024        Task: Reviewed possible resources for acquiring cost prohibitive medication. Completed 8/7/2024          Follow Up Plan     Follow up in about 5 weeks (around 9/30/2024).    Today's care plan and follow up schedule was discussed with patient.  Scooter verbalized understanding of the care plan, goals, and agrees to follow up plan.        The patient was encouraged to communicate with his/her health care provider/physician and care team regarding his/her condition(s) and treatment.  I provided the patient with my contact information today and encouraged to contact me via phone or [facility] Patient Portal as needed.     Length of Visit   Total Time: 0 Minutes

## 2024-08-29 DIAGNOSIS — E11.649 TYPE 2 DIABETES MELLITUS WITH HYPOGLYCEMIA WITHOUT COMA, WITHOUT LONG-TERM CURRENT USE OF INSULIN: ICD-10-CM

## 2024-08-29 RX ORDER — FLASH GLUCOSE SENSOR
1 KIT MISCELLANEOUS
Qty: 2 KIT | Refills: 5 | Status: SHIPPED | OUTPATIENT
Start: 2024-08-29

## 2024-09-12 DIAGNOSIS — E11.649 TYPE 2 DIABETES MELLITUS WITH HYPOGLYCEMIA WITHOUT COMA, WITHOUT LONG-TERM CURRENT USE OF INSULIN: ICD-10-CM

## 2024-09-12 RX ORDER — FLASH GLUCOSE SENSOR
1 KIT MISCELLANEOUS
Qty: 2 KIT | Refills: 5 | Status: SHIPPED | OUTPATIENT
Start: 2024-09-12

## 2024-09-17 ENCOUNTER — OFFICE VISIT (OUTPATIENT)
Dept: FAMILY MEDICINE | Facility: CLINIC | Age: 74
End: 2024-09-17
Payer: MEDICARE

## 2024-09-17 VITALS
RESPIRATION RATE: 20 BRPM | BODY MASS INDEX: 29.88 KG/M2 | WEIGHT: 175 LBS | OXYGEN SATURATION: 96 % | HEART RATE: 73 BPM | HEIGHT: 64 IN | DIASTOLIC BLOOD PRESSURE: 74 MMHG | SYSTOLIC BLOOD PRESSURE: 126 MMHG | TEMPERATURE: 98 F

## 2024-09-17 DIAGNOSIS — M79.605 MUSCULOSKELETAL PAIN OF LEFT LOWER EXTREMITY: Primary | ICD-10-CM

## 2024-09-17 PROCEDURE — 99213 OFFICE O/P EST LOW 20 MIN: CPT | Mod: ,,,

## 2024-09-17 RX ORDER — TIZANIDINE 2 MG/1
4 TABLET ORAL EVERY 6 HOURS PRN
Qty: 40 TABLET | Refills: 0 | Status: SHIPPED | OUTPATIENT
Start: 2024-09-17 | End: 2024-09-27

## 2024-09-17 RX ORDER — IBUPROFEN 600 MG/1
600 TABLET ORAL EVERY 6 HOURS PRN
Qty: 40 TABLET | Refills: 0 | Status: SHIPPED | OUTPATIENT
Start: 2024-09-17 | End: 2024-09-27

## 2024-09-17 NOTE — PROGRESS NOTES
Patient ID: [id number]     Chief Complaint: Leg Pain (Left groin area x 2 week/Pain 5-6/10)    HPI:     Scooter Donato, a 74-year-old male, presents today with left groin pain that began approximately two weeks ago. He indicates that the discomfort started after he jumped onto his tractor. He experiences difficulty sleeping in bed but finds relief while resting in his recliner. He reports no pain when transitioning from sitting to standing and denies any signs of erythema or tenderness. He is capable of bearing weight on his leg and does not report any restrictions in range of motion. Currently, he rates his pain as a 5 out of 10. He mentions having previously undergone therapy and is utilizing stretches he learned, which have been helpful in alleviating his pain.    Past Medical History:   Diagnosis Date    Allergic rhinitis     Anxiety disorder, unspecified     Asthma     Asthmatic bronchitis     Atherosclerosis of aorta     Chronic inflammatory arthritis     Claudication     Diabetic neuropathy     Dysphagia     Family history of aortic aneurysm     GERD (gastroesophageal reflux disease)     Heart murmur     HLD (hyperlipidemia)     HTN (hypertension)     Hypercalcemia     Hyperparathyroidism     Hypoparathyroidism, unspecified     Lower extremity edema     Male erectile dysfunction, unspecified     Multiple actinic keratoses     Nonrheumatic mitral (valve) insufficiency     Osteoarthritis     Personal history of colonic polyps 2017    Tubular adenoma 2017    Type 2 diabetes mellitus     Vitamin D deficiency         Past Surgical History:   Procedure Laterality Date    APPENDECTOMY      COLONOSCOPY  12/05/2022    Dr Himanshu Prajapati - [place], [place]    CYSTOSCOPY  2012    TONSILLECTOMY          Social History     Socioeconomic History    Marital status:    Tobacco Use    Smoking status: Never    Smokeless tobacco: Never   Substance and Sexual Activity    Alcohol use: Yes    Drug use: Never    Sexual activity: Not  Currently     Partners: Female     Social Determinants of Health     Financial Resource Strain: Medium Risk (8/26/2024)    Overall Financial Resource Strain (CARDIA)     Difficulty of Paying Living Expenses: Somewhat hard   Food Insecurity: No Food Insecurity (8/7/2024)    Hunger Vital Sign     Worried About Running Out of Food in the Last Year: Never true     Ran Out of Food in the Last Year: Never true   Transportation Needs: No Transportation Needs (8/7/2024)    TRANSPORTATION NEEDS     Transportation : No   Physical Activity: Sufficiently Active (5/31/2023)    Exercise Vital Sign     Days of Exercise per Week: 6 days     Minutes of Exercise per Session: 30 min   Stress: No Stress Concern Present (5/31/2023)    [employer] - Occupational Stress Questionnaire     Feeling of Stress : Only a little   Housing Stability: Low Risk  (8/7/2024)    Housing Stability Vital Sign     Unable to Pay for Housing in the Last Year: No     Homeless in the Last Year: No        Current Outpatient Medications   Medication Instructions    albuterol (PROVENTIL/VENTOLIN HFA) 90 mcg/actuation inhaler INHALE 2 PUFFS BY MOUTH EVERY 4 - 6 HOURS AS NEEDED FOR WHEEZING    atorvastatin (LIPITOR) 10 mg, Oral, Nightly    blood sugar diagnostic Strp 1 each, Misc.(Non-Drug; Combo Route), Daily    blood-glucose meter kit Use as instructed    ezetimibe (ZETIA) 10 mg, Oral, Nightly    flash glucose sensor (FREESTYLE MADDIE 14 DAY SENSOR) Kit 1 each, Misc.(Non-Drug; Combo Route), Every 14 days    fluticasone furoate-vilanteroL (BREO ELLIPTA) 100-25 mcg/dose diskus inhaler 1 puff, Inhalation, Daily, Controller    ibuprofen (ADVIL,MOTRIN) 600 mg, Oral, Every 6 hours PRN    lancets (LANCETS,THIN) Misc 1 each, Misc.(Non-Drug; Combo Route), Daily    metFORMIN (GLUCOPHAGE-XR) 1,000 mg, Oral, 2 times daily with meals    mometasone (NASONEX) 50 mcg/actuation nasal spray 2 sprays    omeprazole (PRILOSEC) 20 mg, Oral, Every morning  "   OZEMPIC 0.5 mg, Subcutaneous, Every 7 days    tiZANidine (ZANAFLEX) 4 mg, Oral, Every 6 hours PRN    valsartan (DIOVAN) 320 mg, Oral, Daily       Review of patient's allergies indicates:  No Known Allergies     Patient Care Team:  John Ford DO as PCP - General (Family Medicine)  [facility] -  Ulysses Hernandez MD as Consulting Provider (Rheumatology)  Lizz Britt as Consulting Provider (Internal Medicine)  Bam Sagastume MD as Consulting Provider (Dermatology)  Letha De Anda MD as Consulting Provider (Cardiology)  [facility] (Oncology)  Max Gonzalez MD as Resident (Cardiology)  [facility] (Optometry)     Subjective:     Review of Systems    12 point review of systems conducted, negative except as stated in the history of present illness. See HPI for details.    Objective:     Visit Vitals  /74 (BP Location: Right arm, Patient Position: Sitting, BP Method: Large (Automatic))   Pulse 73   Temp 97.8 °F (36.6 °C)   Resp 20   Ht 5' 4" (1.626 m)   Wt 79.4 kg (175 lb)   SpO2 96%   BMI 30.04 kg/m²     Physical Exam  Vitals and nursing note reviewed.   Constitutional:       General: He is not in acute distress.     Appearance: He is not ill-appearing.   HENT:      Head: Normocephalic and atraumatic.      Mouth/Throat:      Mouth: Mucous membranes are moist.      Pharynx: Oropharynx is clear.   Eyes:      General: No scleral icterus.     Extraocular Movements: Extraocular movements intact.      Conjunctiva/sclera: Conjunctivae normal.      Pupils: Pupils are equal, round, and reactive to light.   Neck:      Vascular: No carotid bruit.   Cardiovascular:      Rate and Rhythm: Normal rate and regular rhythm.      Heart sounds: No murmur heard.     No friction rub. No gallop.   Pulmonary:      Effort: Pulmonary effort is normal. No respiratory distress.      Breath sounds: Normal breath sounds. No wheezing, rhonchi or rales.   Abdominal:      " General: Abdomen is flat. Bowel sounds are normal. There is no distension.      Palpations: Abdomen is soft. There is no mass.      Tenderness: There is no abdominal tenderness.   Musculoskeletal:      Cervical back: Normal range of motion and neck supple.      Right hip: Normal.      Left hip: Normal.      Right upper leg: Normal.      Left upper leg: Normal.      Right knee: Normal.      Left knee: Normal.   Skin:     General: Skin is warm and dry.   Neurological:      General: No focal deficit present.      Mental Status: He is alert.      Motor: Motor function is intact.      Coordination: Rapid alternating movements normal.      Gait: Gait normal.   Psychiatric:         Mood and Affect: Mood normal.       Labs Reviewed:     Chemistry:  Lab Results   Component Value Date     07/31/2024    K 4.1 07/31/2024    BUN 19.0 07/31/2024    CREATININE 0.78 07/31/2024    EGFRNORACEVR >60 07/31/2024    GLUCOSE 100 07/31/2024    CALCIUM 9.4 07/31/2024    ALKPHOS 63 07/31/2024    LABPROT 6.6 07/31/2024    ALBUMIN 3.9 07/31/2024    AST 17 07/31/2024    ALT 19 07/31/2024    PHOS 3.3 08/16/2022    BSSNGHWX74HZ 54.9 11/30/2023    TSH 1.252 11/30/2023    HDBRAK6BASO 1.06 08/16/2022    PSA 2.54 11/30/2023        Lab Results   Component Value Date    HGBA1C 6.0 07/31/2024        Hematology:  Lab Results   Component Value Date    WBC 7.64 11/30/2023    HGB 14.9 11/30/2023    HCT 44.8 11/30/2023     11/30/2023       Lipid Panel:  Lab Results   Component Value Date    CHOL 115 06/24/2024    HDL 42 06/24/2024    LDL 61.00 06/24/2024    TRIG 58 06/24/2024    TOTALCHOLEST 3 06/24/2024        Urine:  Lab Results   Component Value Date    CREATRANDUR 60.6 (L) 03/07/2024        Assessment:       ICD-10-CM ICD-9-CM   1. Musculoskeletal pain of left lower extremity  M79.605 729.5        Plan:     1. Musculoskeletal pain of left lower extremity  -     Ambulatory referral/consult to Physical/Occupational Therapy; Future; Expected  date: 09/24/2024  -     ibuprofen (ADVIL,MOTRIN) 600 MG tablet; Take 1 tablet (600 mg total) by mouth every 6 (six) hours as needed for Pain.  Dispense: 40 tablet; Refill: 0  -     tiZANidine (ZANAFLEX) 2 MG tablet; Take 2 tablets (4 mg total) by mouth every 6 (six) hours as needed (muscle pain).  Dispense: 40 tablet; Refill: 0    Based on the patient's physical examination and history of present illness, there is a low suspicion for DVT.   ibuprofen 600 mg every 6 hours as needed for pain + tizanidine 4 mg every 6 hours as needed.   Physical therapy has been requested by the patient.    Follow up for Keep Scheduled Appointment.. In addition to their scheduled follow up, the patient has also been instructed to follow up on as needed basis.     Future Appointments   Date Time Provider Department Center   9/30/2024  9:00 AM Jami Gallo RD [facility]   12/10/2024  9:00 AM Lo Qureshi NP [facility]        Lo Qureshi NP

## 2024-10-15 ENCOUNTER — CLINICAL SUPPORT (OUTPATIENT)
Dept: REHABILITATION | Facility: HOSPITAL | Age: 74
End: 2024-10-15
Payer: MEDICARE

## 2024-10-15 DIAGNOSIS — M54.40 CHRONIC BILATERAL LOW BACK PAIN WITH SCIATICA, SCIATICA LATERALITY UNSPECIFIED: Primary | ICD-10-CM

## 2024-10-15 DIAGNOSIS — M62.81 MUSCLE WEAKNESS: ICD-10-CM

## 2024-10-15 DIAGNOSIS — G89.29 CHRONIC BILATERAL LOW BACK PAIN WITH SCIATICA, SCIATICA LATERALITY UNSPECIFIED: Primary | ICD-10-CM

## 2024-10-15 PROCEDURE — 97163 PT EVAL HIGH COMPLEX 45 MIN: CPT

## 2024-10-15 PROCEDURE — 97530 THERAPEUTIC ACTIVITIES: CPT

## 2024-10-15 NOTE — PROGRESS NOTES
[facility]  Physical Therapy Initial Evaluation    Name: Scooter Donato  Clinic Number: [id number]    Therapy Diagnosis: No diagnosis found.  Physician: Lo Qureshi NP    Physician Orders: PT Eval and Treat  Medical Diagnosis from Referral: groin pain and dysfunction  Evaluation Date: 10/15/2024  Authorization Period Expiration: medically necessary  Plan of Care Expiration: 1/15/2025  Visit # / Visits authorized: 0/ medically necessary    Time In: 1245  Time Out: 140  Total Appointment Time (timed & untimed codes): 55 minutes  Total Treatment time (time-based codes) separate from Evaluation: 25 minutes      Subjective     Bereket reports: he is referred for left groin pain that began 2-3 weeks ago, he thinks after he jumped on a tractor. He has had back pain and dysfunction for many years and actually had therapy several years ago that really helped his back. He says his back pain worsened a couple of days ago after mowing.    He reports significant difficulty with transitional movements and is unable to tolerate sleeping in a bed due to extreme low back pain.    He does say that his groin pain seems to be a little better today compared to last week.      Imaging = no current imaging available        Medical History:   Past Medical History:   Diagnosis Date    Allergic rhinitis     Anxiety disorder, unspecified     Asthma     Asthmatic bronchitis     Atherosclerosis of aorta     Chronic inflammatory arthritis     Claudication     Diabetic neuropathy     Dysphagia     Family history of aortic aneurysm     GERD (gastroesophageal reflux disease)     Heart murmur     HLD (hyperlipidemia)     HTN (hypertension)     Hypercalcemia     Hyperparathyroidism     Hypoparathyroidism, unspecified     Lower extremity edema     Male erectile dysfunction, unspecified     Multiple actinic keratoses     Nonrheumatic mitral (valve) insufficiency     Osteoarthritis     Personal history of colonic polyps  2017    Tubular adenoma 2017    Type 2 diabetes mellitus     Vitamin D deficiency        Surgical History:   Scooter Donato  has a past surgical history that includes Tonsillectomy; Cystoscopy (2012); Colonoscopy (12/05/2022); and Appendectomy.    Medications:   Scooter has a current medication list which includes the following prescription(s): albuterol, atorvastatin, blood sugar diagnostic, blood-glucose meter, ezetimibe, freestyle gerard 14 day sensor, fluticasone furoate-vilanterol, lancets, metformin, mometasone, omeprazole, ozempic, and valsartan.    Allergies:   Review of patient's allergies indicates:  No Known Allergies      CMS Impairment/Limitation/Restriction for FOTO Survey  Therapist reviewed FOTO scores for Scooter Donato on 10/15/2024. FOTO documents entered into [employer] - see Media section.  Patient's Physical FS Primary Measure: 47  Risk Adjusted Statistical FOTO: 53  Limitation Score: 53%  Category: Mobility  MDQ: 38% disability    Objective     Eval 10/15/2024   Pain:    9/10 low back pain  9-10/10 in left groin     Posture:     Hypertrophy along right paraspinals, elevated right shoulder, lateral shift left at TL junction   Gait Analysis:     Antalgic gait pattern w decreased stance phase left.  Left sacral  lateral lean to left w pelvic height difference, right pelvis elevated.     Palpation    Pain at low back along L5/S1 line at B SIJ.  Decreased movement w PA glides along entire spine.  No pain w PA glides throughout spine.    Point tenderness at left hip flexor origins and along adductor and rectus musculature from hip along medial thigh to knee.     Dermatomes    Numbness at right  anterior thigh, no other radiating symptoms.     Myotomes    Abdominal core - 2/5 (abdominal hernia obvious)      HIP FLX - Right 5/5, Left 4-/5  HIP EXT - Right 3-/5, Left 3-/5    HIP ABD - Right 3-/5, Left 3-/5  HIP ADD - Right 3/5, Left 2/5  KNEE FLX - Right 3-/5, Left 2/5  KNEE EXT - Right 5/5, Left 4/5  ANKLE DF  - Right 5/5, Left 4/5  ANKLE PF - Right NT/5, Left NT/5       Hip/SI Clearance    AISHA: pos B  FADDIR: pos B    SACRAL DISTRACTION: inconclusive  SACRAL THIGH THRUST: pos B  SACRAL THRUST: inconclusive B  SACRAL COMPRESSION: pos B     AROM lumbar    Flexion: 0  Extension: 10    Pain with extension     Flexibility    Rectus: 80 R, 80 L  Cesar Test: max restr L  90/90 Hamstrin L, 75 R  Piriformis Test: mod restr R, mod/max restr L     Special Tests      SLR: neg B       Functional Testing    5x STS = 20 sec  2 MWT = 300' without AD    Sit to stand = mod difficulty w UE assist  Bed mobility = max difficulty  Transitional movements = mod difficulty    Balance    Tandem R forward = NT  Tandem L forward = NT          TREATMENT     Bereket received the treatments listed below:       Time Activities   Manual     TherAct 25 min HEP and POC discussions, patient education   TherEx     Gait     Neuro Re-ed     Modalities     E-Stim     Dry Needling     Canalith Repositioning         Home Exercises and Patient Education Provided    Education provided:   -Plan of care, HEP, walking and modalities for pain    Written Home Exercises Provided: yes.  Exercises were reviewed and Bereket was able to demonstrate them prior to the end of the session.  Bereket demonstrated good  understanding of the education provided.     See EMR under Media for exercises provided  10/15/2024 .    Assessment   Scooter is a 74 y.o. male referred to outpatient Physical Therapy with a medical diagnosis of left groin and low back pain.     He appears to have a resolving left groin strain that continues to limit left LE functional movement and evidence of chronic degenerative lumbar issues.  Patient presents with weakness at left hip adductor and generalized extensor weakness throughout hip girdle B.  He has limited flexibility at lumbar region and hip girdle, left worse than right, and he has mod to max difficulty with all functional movement.  Bed mobility  is extremely difficult and gait pattern is not normal.      Initiated exercise for spinal mobilization and core recruitment with reports of relief at end of session.  HEP issued and patient states understanding.     Patient will benefit from skilled outpatient Physical Therapy to address the deficits stated above, provide education, and to maximize patient's level of independence.         Patient prognosis is Good.     Plan of care discussed with patient: Yes  Patient's spiritual, cultural and educational needs considered and patient is agreeable to the plan of care and goals as stated below:     Anticipated Barriers for therapy: scheduling conflicts    Goals:  Short Term Goals: 6 weeks   Patient will report at least 10% disability reduction from initial MDQ score to indicate clinically significant functional improvement  Patient will report at least 10 point increase on initial FOTO score to indicate clinically significant functional improvement  Patient will report 50% pain reduction    Long Term Goals: 12 weeks   Patient will report at least 20% disability reduction from initial MDQ score to indicate clinically significant functional improvement  Patient will report at least 20 point increase on initial FOTO score to indicate clinically significant functional improvement  Patient will report 50% overall perceived improvement  Patient will demonstrate independence and compliance with HEP    Plan   Plan of care Certification: 10/15/2024 to 1/15/2025.    Outpatient Physical Therapy 2-3 times weekly for 12 weeks to include the following interventions: Cervical/Lumbar Traction, Gait Training, Manual Therapy, Moist Heat/ Ice, Neuromuscular Re-ed, Patient Education, Self Care, Therapeutic Activities, and Therapeutic Exercise.     Nelly Smith, PT

## 2024-10-17 DIAGNOSIS — J45.30 MILD PERSISTENT ASTHMA WITHOUT COMPLICATION: ICD-10-CM

## 2024-10-17 RX ORDER — FLUTICASONE FUROATE AND VILANTEROL TRIFENATATE 100; 25 UG/1; UG/1
1 POWDER RESPIRATORY (INHALATION)
Qty: 60 EACH | Refills: 2 | Status: SHIPPED | OUTPATIENT
Start: 2024-10-17

## 2024-10-30 DIAGNOSIS — Z12.5 ENCOUNTER FOR PROSTATE CANCER SCREENING: ICD-10-CM

## 2024-10-30 DIAGNOSIS — I10 PRIMARY HYPERTENSION: ICD-10-CM

## 2024-10-30 DIAGNOSIS — E78.00 HYPERCHOLESTEROLEMIA: ICD-10-CM

## 2024-10-30 DIAGNOSIS — Z00.00 WELLNESS EXAMINATION: Primary | ICD-10-CM

## 2024-10-30 DIAGNOSIS — E11.649 TYPE 2 DIABETES MELLITUS WITH HYPOGLYCEMIA WITHOUT COMA, WITHOUT LONG-TERM CURRENT USE OF INSULIN: ICD-10-CM

## 2024-11-06 NOTE — PROGRESS NOTES
Physical Therapy Treatment Note     Name: Scooter Donato  Clinic Number: [id number]    Therapy Diagnosis: No diagnosis found.  Physician: Lo Qureshi NP    Visit Date: 11/7/2024    Physician Orders: PT Eval and Treat  Medical Diagnosis from Referral: groin pain and dysfunction  Evaluation Date: 10/15/2024  Authorization Period Expiration: medically necessary  Plan of Care Expiration: 1/15/2025  Visit # / Visits authorized: 1/ medically necessary     Time In: 942  Time Out: 1100  Total Appointment Time: 68 minutes    Subjective     Patient reports: he felt much better after evaluation and session of therapy.  He has since been out of town on a work assignment, but now he is back in town and wanting to start therapy.  He says his back feels much better.  He does say now his shoulders are hurting B.    CMS Impairment/Limitation/Restriction for FOTO Survey  Therapist reviewed FOTO scores for Scooter Donato on 10/15/2024. FOTO documents entered into [employer] - see Media section.  Patient's Physical FS Primary Measure: 47  Risk Adjusted Statistical FOTO: 53  Limitation Score: 53%  Category: Mobility  MDQ: 38% disability    Objective     Eval 10/15/2024   Pain:     9/10 low back pain  9-10/10 in left groin      Posture:      Hypertrophy along right paraspinals, elevated right shoulder, lateral shift left at TL junction   Gait Analysis:      Antalgic gait pattern w decreased stance phase left.  Left sacral  lateral lean to left w pelvic height difference, right pelvis elevated.      Palpation     Pain at low back along L5/S1 line at B SIJ.  Decreased movement w PA glides along entire spine.  No pain w PA glides throughout spine.     Point tenderness at left hip flexor origins and along adductor and rectus musculature from hip along medial thigh to knee.      Dermatomes     Numbness at right  anterior thigh, no other radiating symptoms.      Myotomes     Abdominal core - 2/5 (abdominal hernia obvious)        HIP FLX -  Right 5/5, Left 4-/5  HIP EXT - Right 3-/5, Left 3-/5     HIP ABD - Right 3-/5, Left 3-/5  HIP ADD - Right 3/5, Left 2/5  KNEE FLX - Right 3-/5, Left 2/5  KNEE EXT - Right 5/5, Left 4/5  ANKLE DF - Right 5/5, Left 4/5  ANKLE PF - Right NT/5, Left NT/5         Hip/SI Clearance     AISHA: pos B  FADDIR: pos B     SACRAL DISTRACTION: inconclusive  SACRAL THIGH THRUST: pos B  SACRAL THRUST: inconclusive B  SACRAL COMPRESSION: pos B      AROM lumbar     Flexion: 0  Extension: 10     Pain with extension      Flexibility     Rectus: 80 R, 80 L  Cesar Test: max restr L  90/90 Hamstrin L, 75 R  Piriformis Test: mod restr R, mod/max restr L      Special Tests        SLR: neg B         Functional Testing     5x STS = 20 sec  2 MWT = 300' without AD     Sit to stand = mod difficulty w UE assist  Bed mobility = max difficulty  Transitional movements = mod difficulty     Balance     Tandem R forward = NT  Tandem L forward = NT     Bereket received the following treatment:     Time Activities   Manual 8 min STM and stretching at hip girdle   TherAct 10 min Standing functional step ups and NuStep   TherEx 30 min Core and hip girdle muscle balance restoration   Gait     Neuro Re-ed     Modalities 20 min MH prior, ice at end of session.   E-Stim     Dry Needling     Canalith Repositioning           Home Exercises Provided and Patient Education Provided     Education provided:   -Plan of care, HEP, walking    Assessment     Exercise focus on prone positioning w hip and HS recruitment, quad stretch with good response.  Supine core recruitment and hip girdle strengthening tolerated.  VC for correct core use.  Hip flexibility remains poor.      Able to tolerate full exercise program and functional standing activity with decreased reports of pain.  NuStep x 5 minutes.    Will continue to progress functional activity tolerance as able. Patient in agreement with plan.    Patient prognosis is Good.      Anticipated barriers to physical  therapy: work scheduling conflicts    Goals: Bereket Is progressing well towards his goals.  Short Term Goals: 6 weeks   Patient will report at least 10% disability reduction from initial MDQ score to indicate clinically significant functional improvement  Patient will report at least 10 point increase on initial FOTO score to indicate clinically significant functional improvement  Patient will report 50% pain reduction     Long Term Goals: 12 weeks   Patient will report at least 20% disability reduction from initial MDQ score to indicate clinically significant functional improvement  Patient will report at least 20 point increase on initial FOTO score to indicate clinically significant functional improvement  Patient will report 50% overall perceived improvement  Patient will demonstrate independence and compliance with HEP  Plan     2-3x/week x 12 weeks    Nelly Smith, PT

## 2024-11-07 ENCOUNTER — CLINICAL SUPPORT (OUTPATIENT)
Dept: REHABILITATION | Facility: HOSPITAL | Age: 74
End: 2024-11-07
Payer: MEDICARE

## 2024-11-07 DIAGNOSIS — M62.81 MUSCLE WEAKNESS: ICD-10-CM

## 2024-11-07 DIAGNOSIS — M54.40 CHRONIC BILATERAL LOW BACK PAIN WITH SCIATICA, SCIATICA LATERALITY UNSPECIFIED: Primary | ICD-10-CM

## 2024-11-07 DIAGNOSIS — G89.29 CHRONIC BILATERAL LOW BACK PAIN WITH SCIATICA, SCIATICA LATERALITY UNSPECIFIED: Primary | ICD-10-CM

## 2024-11-07 PROCEDURE — 97530 THERAPEUTIC ACTIVITIES: CPT

## 2024-11-07 PROCEDURE — 97110 THERAPEUTIC EXERCISES: CPT

## 2024-11-07 PROCEDURE — 97140 MANUAL THERAPY 1/> REGIONS: CPT

## 2024-11-11 ENCOUNTER — CLINICAL SUPPORT (OUTPATIENT)
Dept: REHABILITATION | Facility: HOSPITAL | Age: 74
End: 2024-11-11
Payer: MEDICARE

## 2024-11-11 DIAGNOSIS — M54.40 CHRONIC BILATERAL LOW BACK PAIN WITH SCIATICA, SCIATICA LATERALITY UNSPECIFIED: Primary | ICD-10-CM

## 2024-11-11 DIAGNOSIS — G89.29 CHRONIC BILATERAL LOW BACK PAIN WITH SCIATICA, SCIATICA LATERALITY UNSPECIFIED: Primary | ICD-10-CM

## 2024-11-11 DIAGNOSIS — M62.81 MUSCLE WEAKNESS: ICD-10-CM

## 2024-11-11 PROCEDURE — 97530 THERAPEUTIC ACTIVITIES: CPT

## 2024-11-11 PROCEDURE — 97140 MANUAL THERAPY 1/> REGIONS: CPT

## 2024-11-11 PROCEDURE — 97110 THERAPEUTIC EXERCISES: CPT

## 2024-11-11 NOTE — PROGRESS NOTES
Physical Therapy Treatment Note     Name: Scooter Donato  Clinic Number: [id number]    Therapy Diagnosis:   Encounter Diagnoses   Name Primary?    Chronic bilateral low back pain with sciatica, sciatica laterality unspecified Yes    Muscle weakness      Physician: Lo Qureshi NP    Visit Date: 11/11/2024    Physician Orders: PT Eval and Treat  Medical Diagnosis from Referral: groin pain and dysfunction  Evaluation Date: 10/15/2024  Authorization Period Expiration: medically necessary  Plan of Care Expiration: 1/15/2025  Visit # / Visits authorized: 2/ medically necessary     Time In: 725  Time Out: 842  Total Appointment Time: 77 minutes    Subjective     Patient reports: his back and legs are feeling better, but neck and shoulders continue to give him problems.    CMS Impairment/Limitation/Restriction for FOTO Survey  Therapist reviewed FOTO scores for Scooter Donato on 10/15/2024. FOTO documents entered into [employer] - see Media section.  Patient's Physical FS Primary Measure: 47  Risk Adjusted Statistical FOTO: 53  Limitation Score: 53%  Category: Mobility  MDQ: 38% disability    Objective     Eval 10/15/2024   Pain:     9/10 low back pain  9-10/10 in left groin      Posture:      Hypertrophy along right paraspinals, elevated right shoulder, lateral shift left at TL junction   Gait Analysis:      Antalgic gait pattern w decreased stance phase left.  Left sacral  lateral lean to left w pelvic height difference, right pelvis elevated.      Palpation     Pain at low back along L5/S1 line at B SIJ.  Decreased movement w PA glides along entire spine.  No pain w PA glides throughout spine.     Point tenderness at left hip flexor origins and along adductor and rectus musculature from hip along medial thigh to knee.      Dermatomes     Numbness at right  anterior thigh, no other radiating symptoms.      Myotomes     Abdominal core - 2/5 (abdominal hernia obvious)        HIP FLX - Right 5/5, Left 4-/5  HIP EXT - Right  3-/5, Left 3-/5     HIP ABD - Right 3-/5, Left 3-/5  HIP ADD - Right 3/5, Left 2/5  KNEE FLX - Right 3-/5, Left 2/5  KNEE EXT - Right 5/5, Left 4/5  ANKLE DF - Right 5/5, Left 4/5  ANKLE PF - Right NT/5, Left NT/5         Hip/SI Clearance     AISHA: pos B  FADDIR: pos B     SACRAL DISTRACTION: inconclusive  SACRAL THIGH THRUST: pos B  SACRAL THRUST: inconclusive B  SACRAL COMPRESSION: pos B      AROM lumbar     Flexion: 0  Extension: 10     Pain with extension      Flexibility     Rectus: 80 R, 80 L  Cesar Test: max restr L  90/90 Hamstrin L, 75 R  Piriformis Test: mod restr R, mod/max restr L      Special Tests        SLR: neg B         Functional Testing     5x STS = 20 sec  2 MWT = 300' without AD     Sit to stand = mod difficulty w UE assist  Bed mobility = max difficulty  Transitional movements = mod difficulty     Balance     Tandem R forward = NT  Tandem L forward = NT     Bereket received the following treatment:     Time Activities   Manual 8 min STM and stretching at hip girdle   TherAct 20 min Standing functional step ups and NuStep   TherEx 29 min Core and hip girdle muscle balance restoration   Gait     Neuro Re-ed     Modalities 20 min MH prior, ice at end of session.   E-Stim     Dry Needling     Canalith Repositioning           Home Exercises Provided and Patient Education Provided     Education provided:   -Plan of care, HEP, walking    Assessment     Exercise focus on prone positioning w hip and HS recruitment, quad stretch with good response.  Supine core recruitment and hip girdle strengthening tolerated.  VC for correct core use.  Hip flexibility remains poor.      Able to tolerate full exercise program and functional standing activity with decreased reports of pain.  NuStep x 7 minutes.    Increased ease with functional mobility noted.    Will continue to progress functional activity tolerance as able. Patient in agreement with plan.    Patient prognosis is Good.      Anticipated barriers  to physical therapy: work scheduling conflicts    Goals: Bereket Is progressing well towards his goals.  Short Term Goals: 6 weeks   Patient will report at least 10% disability reduction from initial MDQ score to indicate clinically significant functional improvement  Patient will report at least 10 point increase on initial FOTO score to indicate clinically significant functional improvement  Patient will report 50% pain reduction     Long Term Goals: 12 weeks   Patient will report at least 20% disability reduction from initial MDQ score to indicate clinically significant functional improvement  Patient will report at least 20 point increase on initial FOTO score to indicate clinically significant functional improvement  Patient will report 50% overall perceived improvement  Patient will demonstrate independence and compliance with HEP  Plan     2-3x/week x 12 weeks    Nelly Smith, PT

## 2024-11-15 ENCOUNTER — TELEPHONE (OUTPATIENT)
Dept: FAMILY MEDICINE | Facility: CLINIC | Age: 74
End: 2024-11-15
Payer: MEDICARE

## 2024-11-15 DIAGNOSIS — R11.2 NAUSEA AND VOMITING, UNSPECIFIED VOMITING TYPE: Primary | ICD-10-CM

## 2024-11-15 DIAGNOSIS — R19.7 DIARRHEA, UNSPECIFIED TYPE: ICD-10-CM

## 2024-11-15 RX ORDER — DIPHENOXYLATE HYDROCHLORIDE AND ATROPINE SULFATE 2.5; .025 MG/1; MG/1
1 TABLET ORAL 4 TIMES DAILY PRN
Qty: 40 TABLET | Refills: 0 | Status: SHIPPED | OUTPATIENT
Start: 2024-11-15 | End: 2024-11-25

## 2024-11-15 RX ORDER — ONDANSETRON 8 MG/1
8 TABLET, ORALLY DISINTEGRATING ORAL ONCE
Qty: 1 TABLET | Refills: 0 | Status: SHIPPED | OUTPATIENT
Start: 2024-11-15 | End: 2024-11-15

## 2024-11-18 ENCOUNTER — CLINICAL SUPPORT (OUTPATIENT)
Dept: REHABILITATION | Facility: HOSPITAL | Age: 74
End: 2024-11-18
Payer: MEDICARE

## 2024-11-18 DIAGNOSIS — G89.29 CHRONIC BILATERAL LOW BACK PAIN WITH SCIATICA, SCIATICA LATERALITY UNSPECIFIED: Primary | ICD-10-CM

## 2024-11-18 DIAGNOSIS — M54.40 CHRONIC BILATERAL LOW BACK PAIN WITH SCIATICA, SCIATICA LATERALITY UNSPECIFIED: Primary | ICD-10-CM

## 2024-11-18 PROCEDURE — 97110 THERAPEUTIC EXERCISES: CPT

## 2024-11-18 PROCEDURE — 97530 THERAPEUTIC ACTIVITIES: CPT

## 2024-11-18 PROCEDURE — 97140 MANUAL THERAPY 1/> REGIONS: CPT

## 2024-11-18 NOTE — PROGRESS NOTES
Physical Therapy Treatment Note     Name: Scooter Donato  Clinic Number: [id number]    Therapy Diagnosis:   Encounter Diagnosis   Name Primary?    Chronic bilateral low back pain with sciatica, sciatica laterality unspecified Yes     Physician: Lo Qureshi NP    Visit Date: 11/18/2024    Physician Orders: PT Eval and Treat  Medical Diagnosis from Referral: groin pain and dysfunction  Evaluation Date: 10/15/2024  Authorization Period Expiration: medically necessary  Plan of Care Expiration: 1/15/2025  Visit # / Visits authorized: 3/ medically necessary     Time In: 730  Time Out: 844  Total Appointment Time: 74 minutes    Subjective     Patient reports: his back and legs are feeling much better, but neck and shoulders continue to give him problems.    CMS Impairment/Limitation/Restriction for FOTO Survey  Therapist reviewed FOTO scores for Scooter Donato on 10/15/2024. FOTO documents entered into [employer] - see Media section.  Patient's Physical FS Primary Measure: 47  Risk Adjusted Statistical FOTO: 53  Limitation Score: 53%  Category: Mobility  MDQ: 38% disability    Objective     Eval 10/15/2024   Pain:     9/10 low back pain  9-10/10 in left groin      Posture:      Hypertrophy along right paraspinals, elevated right shoulder, lateral shift left at TL junction   Gait Analysis:      Antalgic gait pattern w decreased stance phase left.  Left sacral  lateral lean to left w pelvic height difference, right pelvis elevated.      Palpation     Pain at low back along L5/S1 line at B SIJ.  Decreased movement w PA glides along entire spine.  No pain w PA glides throughout spine.     Point tenderness at left hip flexor origins and along adductor and rectus musculature from hip along medial thigh to knee.      Dermatomes     Numbness at right  anterior thigh, no other radiating symptoms.      Myotomes     Abdominal core - 2/5 (abdominal hernia obvious)        HIP FLX - Right 5/5, Left 4-/5  HIP EXT - Right 3-/5, Left  3-/5     HIP ABD - Right 3-/5, Left 3-/5  HIP ADD - Right 3/5, Left 2/5  KNEE FLX - Right 3-/5, Left 2/5  KNEE EXT - Right 5/5, Left 4/5  ANKLE DF - Right 5/5, Left 4/5  ANKLE PF - Right NT/5, Left NT/5         Hip/SI Clearance     AISHA: pos B  FADDIR: pos B     SACRAL DISTRACTION: inconclusive  SACRAL THIGH THRUST: pos B  SACRAL THRUST: inconclusive B  SACRAL COMPRESSION: pos B      AROM lumbar     Flexion: 0  Extension: 10     Pain with extension      Flexibility     Rectus: 80 R, 80 L  Cesar Test: max restr L  90/90 Hamstrin L, 75 R  Piriformis Test: mod restr R, mod/max restr L      Special Tests        SLR: neg B         Functional Testing     5x STS = 20 sec  2 MWT = 300' without AD     Sit to stand = mod difficulty w UE assist  Bed mobility = max difficulty  Transitional movements = mod difficulty     Balance     Tandem R forward = NT  Tandem L forward = NT     Bereket received the following treatment:     Time Activities   Manual 8 min STM and stretching at hip girdle   TherAct 20 min Standing functional step ups and NuStep   TherEx 25 min Core and hip girdle muscle balance restoration   Gait     Neuro Re-ed     Modalities 20 min MH prior, ice at end of session.   E-Stim     Dry Needling     Canalith Repositioning           Home Exercises Provided and Patient Education Provided     Education provided:   -Plan of care, HEP, walking    Assessment     Exercise focus on prone positioning w hip and HS recruitment, quad stretch with good response.  Supine core recruitment and hip girdle strengthening tolerated.  VC for correct core use.  Hip flexibility improving.  Advanced standing step ups and proprioceptive activity today.      Able to tolerate full exercise program and functional standing activity with decreased reports of pain.  NuStep x 10 minutes.    Increased ease with functional mobility noted. Hip abduction significantly weakened.      Will continue to progress functional activity tolerance as able.  Patient in agreement with plan.    Patient prognosis is Good.      Anticipated barriers to physical therapy: work scheduling conflicts    Goals: Bereket Is progressing well towards his goals.  Short Term Goals: 6 weeks   Patient will report at least 10% disability reduction from initial MDQ score to indicate clinically significant functional improvement  Patient will report at least 10 point increase on initial FOTO score to indicate clinically significant functional improvement  Patient will report 50% pain reduction     Long Term Goals: 12 weeks   Patient will report at least 20% disability reduction from initial MDQ score to indicate clinically significant functional improvement  Patient will report at least 20 point increase on initial FOTO score to indicate clinically significant functional improvement  Patient will report 50% overall perceived improvement  Patient will demonstrate independence and compliance with HEP  Plan     2-3x/week x 12 weeks    Nelly Smith, PT

## 2024-11-20 ENCOUNTER — CLINICAL SUPPORT (OUTPATIENT)
Dept: REHABILITATION | Facility: HOSPITAL | Age: 74
End: 2024-11-20
Payer: MEDICARE

## 2024-11-20 DIAGNOSIS — G89.29 CHRONIC BILATERAL LOW BACK PAIN WITH SCIATICA, SCIATICA LATERALITY UNSPECIFIED: Primary | ICD-10-CM

## 2024-11-20 DIAGNOSIS — M54.40 CHRONIC BILATERAL LOW BACK PAIN WITH SCIATICA, SCIATICA LATERALITY UNSPECIFIED: Primary | ICD-10-CM

## 2024-11-20 DIAGNOSIS — M62.81 MUSCLE WEAKNESS: ICD-10-CM

## 2024-11-20 PROCEDURE — 97140 MANUAL THERAPY 1/> REGIONS: CPT

## 2024-11-20 PROCEDURE — 97530 THERAPEUTIC ACTIVITIES: CPT

## 2024-11-20 PROCEDURE — 97035 APP MDLTY 1+ULTRASOUND EA 15: CPT

## 2024-11-20 PROCEDURE — 97110 THERAPEUTIC EXERCISES: CPT

## 2024-11-20 NOTE — PROGRESS NOTES
Physical Therapy Treatment Note     Name: Scooter Donato  Clinic Number: [id number]    Therapy Diagnosis:   Encounter Diagnoses   Name Primary?    Chronic bilateral low back pain with sciatica, sciatica laterality unspecified Yes    Muscle weakness      Physician: Lo Qureshi NP    Visit Date: 11/20/2024    Physician Orders: PT Eval and Treat  Medical Diagnosis from Referral: groin pain and dysfunction  Evaluation Date: 10/15/2024  Authorization Period Expiration: medically necessary  Plan of Care Expiration: 1/15/2025  Visit # / Visits authorized: 4/ medically necessary     Time In: 722  Time Out: 900  Total Appointment Time: 98 minutes    Subjective     Patient reports: his back and legs are feeling much better, but neck and shoulders continue to give him problems.    CMS Impairment/Limitation/Restriction for FOTO Survey  Therapist reviewed FOTO scores for Scooter Donato on 10/15/2024. FOTO documents entered into [employer] - see Media section.  Patient's Physical FS Primary Measure: 47  Risk Adjusted Statistical FOTO: 53  Limitation Score: 53%  Category: Mobility  MDQ: 38% disability    Objective     Eval 10/15/2024   Pain:     9/10 low back pain  9-10/10 in left groin      Posture:      Hypertrophy along right paraspinals, elevated right shoulder, lateral shift left at TL junction   Gait Analysis:      Antalgic gait pattern w decreased stance phase left.  Left sacral  lateral lean to left w pelvic height difference, right pelvis elevated.      Palpation     Pain at low back along L5/S1 line at B SIJ.  Decreased movement w PA glides along entire spine.  No pain w PA glides throughout spine.     Point tenderness at left hip flexor origins and along adductor and rectus musculature from hip along medial thigh to knee.      Dermatomes     Numbness at right  anterior thigh, no other radiating symptoms.      Myotomes     Abdominal core - 2/5 (abdominal hernia obvious)        HIP FLX - Right 5/5, Left 4-/5  HIP EXT -  Right 3-/5, Left 3-/5     HIP ABD - Right 3-/5, Left 3-/5  HIP ADD - Right 3/5, Left 2/5  KNEE FLX - Right 3-/5, Left 2/5  KNEE EXT - Right 5/5, Left 4/5  ANKLE DF - Right 5/5, Left 4/5  ANKLE PF - Right NT/5, Left NT/5         Hip/SI Clearance     AISHA: pos B  FADDIR: pos B     SACRAL DISTRACTION: inconclusive  SACRAL THIGH THRUST: pos B  SACRAL THRUST: inconclusive B  SACRAL COMPRESSION: pos B      AROM lumbar     Flexion: 0  Extension: 10     Pain with extension      Flexibility     Rectus: 80 R, 80 L  Cesar Test: max restr L  90/90 Hamstrin L, 75 R  Piriformis Test: mod restr R, mod/max restr L      Special Tests        SLR: neg B         Functional Testing     5x STS = 20 sec  2 MWT = 300' without AD     Sit to stand = mod difficulty w UE assist  Bed mobility = max difficulty  Transitional movements = mod difficulty     Balance     Tandem R forward = NT  Tandem L forward = NT     Bereket received the following treatment:     Time Activities   Manual 8 min STM and stretching at hip girdle   TherAct 45 min Standing functional step ups and NuStep   TherEx 25 min Core and hip girdle muscle balance restoration   Gait     Neuro Re-ed     Modalities 20 min MH prior, ice at end of session, US to B shoulder   E-Stim     Dry Needling     Canalith Repositioning       Charges based on one on one time.    Home Exercises Provided and Patient Education Provided     Education provided:   -Plan of care, HEP, walking    Assessment     Exercise focus on prone positioning w hip and HS recruitment, quad stretch with good response.  Supine core recruitment and hip girdle strengthening tolerated.  VC for correct core use.  Hip flexibility improving.  Advanced standing step ups and proprioceptive activity today.      Able to tolerate full exercise program and functional standing activity with decreased reports of pain.  NuStep x 10 minutes.    Increased ease with functional mobility noted. Hip abduction significantly weakened.       Will continue to progress functional activity tolerance as able. Patient in agreement with plan.    Patient prognosis is Good.      Anticipated barriers to physical therapy: work scheduling conflicts    Goals: Bereket Is progressing well towards his goals.  Short Term Goals: 6 weeks   Patient will report at least 10% disability reduction from initial MDQ score to indicate clinically significant functional improvement  Patient will report at least 10 point increase on initial FOTO score to indicate clinically significant functional improvement  Patient will report 50% pain reduction     Long Term Goals: 12 weeks   Patient will report at least 20% disability reduction from initial MDQ score to indicate clinically significant functional improvement  Patient will report at least 20 point increase on initial FOTO score to indicate clinically significant functional improvement  Patient will report 50% overall perceived improvement  Patient will demonstrate independence and compliance with HEP  Plan     2-3x/week x 12 weeks    Nelly Smith, PT

## 2024-11-22 ENCOUNTER — CLINICAL SUPPORT (OUTPATIENT)
Dept: REHABILITATION | Facility: HOSPITAL | Age: 74
End: 2024-11-22
Payer: MEDICARE

## 2024-11-22 DIAGNOSIS — M62.81 MUSCLE WEAKNESS: ICD-10-CM

## 2024-11-22 DIAGNOSIS — M54.40 CHRONIC BILATERAL LOW BACK PAIN WITH SCIATICA, SCIATICA LATERALITY UNSPECIFIED: Primary | ICD-10-CM

## 2024-11-22 DIAGNOSIS — G89.29 CHRONIC BILATERAL LOW BACK PAIN WITH SCIATICA, SCIATICA LATERALITY UNSPECIFIED: Primary | ICD-10-CM

## 2024-11-22 PROCEDURE — 97530 THERAPEUTIC ACTIVITIES: CPT

## 2024-11-22 PROCEDURE — 97110 THERAPEUTIC EXERCISES: CPT

## 2024-11-22 PROCEDURE — 97140 MANUAL THERAPY 1/> REGIONS: CPT

## 2024-11-22 PROCEDURE — 97035 APP MDLTY 1+ULTRASOUND EA 15: CPT

## 2024-11-22 NOTE — PROGRESS NOTES
Physical Therapy Treatment Note     Name: Scooter Donato  Clinic Number: [id number]    Therapy Diagnosis:   Encounter Diagnoses   Name Primary?    Chronic bilateral low back pain with sciatica, sciatica laterality unspecified Yes    Muscle weakness      Physician: Lo Qureshi NP    Visit Date: 11/22/2024    Physician Orders: PT Eval and Treat  Medical Diagnosis from Referral: groin pain and dysfunction  Evaluation Date: 10/15/2024  Authorization Period Expiration: medically necessary  Plan of Care Expiration: 1/15/2025  Visit # / Visits authorized: 5/ medically necessary     Time In: 730  Time Out: 900  Total Appointment Time: 90 minutes    Subjective     Patient reports: he feels good all day after leaving therapy.    CMS Impairment/Limitation/Restriction for FOTO Survey  Therapist reviewed FOTO scores for Scooter Donato on 10/15/2024. FOTO documents entered into [employer] - see Media section.  Patient's Physical FS Primary Measure: 47  Risk Adjusted Statistical FOTO: 53  Limitation Score: 53%  Category: Mobility  MDQ: 38% disability    Objective     Eval 10/15/2024   Pain:     9/10 low back pain  9-10/10 in left groin      Posture:      Hypertrophy along right paraspinals, elevated right shoulder, lateral shift left at TL junction   Gait Analysis:      Antalgic gait pattern w decreased stance phase left.  Left sacral  lateral lean to left w pelvic height difference, right pelvis elevated.      Palpation     Pain at low back along L5/S1 line at B SIJ.  Decreased movement w PA glides along entire spine.  No pain w PA glides throughout spine.     Point tenderness at left hip flexor origins and along adductor and rectus musculature from hip along medial thigh to knee.      Dermatomes     Numbness at right  anterior thigh, no other radiating symptoms.      Myotomes     Abdominal core - 2/5 (abdominal hernia obvious)        HIP FLX - Right 5/5, Left 4-/5  HIP EXT - Right 3-/5, Left 3-/5     HIP ABD - Right 3-/5, Left  3-/5  HIP ADD - Right 3/5, Left 2/5  KNEE FLX - Right 3-/5, Left 2/5  KNEE EXT - Right 5/5, Left 4/5  ANKLE DF - Right 5/5, Left 4/5  ANKLE PF - Right NT/5, Left NT/5         Hip/SI Clearance     AISHA: pos B  FADDIR: pos B     SACRAL DISTRACTION: inconclusive  SACRAL THIGH THRUST: pos B  SACRAL THRUST: inconclusive B  SACRAL COMPRESSION: pos B      AROM lumbar     Flexion: 0  Extension: 10     Pain with extension      Flexibility     Rectus: 80 R, 80 L  Cesar Test: max restr L  90/90 Hamstrin L, 75 R  Piriformis Test: mod restr R, mod/max restr L      Special Tests        SLR: neg B         Functional Testing     5x STS = 20 sec  2 MWT = 300' without AD     Sit to stand = mod difficulty w UE assist  Bed mobility = max difficulty  Transitional movements = mod difficulty     Balance     Tandem R forward = NT  Tandem L forward = NT     Bereket received the following treatment:     Time Activities   Manual 10 min STM and stretching at hip girdle   TherAct 40 min Standing functional step ups and NuStep   TherEx 20 min Core and hip girdle muscle balance restoration   Gait     Neuro Re-ed     Modalities 20 min MH prior, ice at end of session, US to B shoulder   E-Stim     Dry Needling     Canalith Repositioning       Charges based on one on one time.    Home Exercises Provided and Patient Education Provided     Education provided:   -Plan of care, HEP, walking    Assessment     Exercise focus on prone positioning w hip and HS recruitment, quad stretch with good response.  Supine core recruitment and hip girdle strengthening tolerated.  VC for correct core use.  Hip flexibility improving.  Advanced standing step ups and proprioceptive activity today.      Able to tolerate full exercise program and functional standing activity with decreased reports of pain.  NuStep x 10 minutes.    Increased ease with functional mobility noted. Hip abduction improving.      Will continue to progress functional activity tolerance as  able. Patient in agreement with plan.    Patient prognosis is Good.      Anticipated barriers to physical therapy: work scheduling conflicts    Goals: Bereket Is progressing well towards his goals.  Short Term Goals: 6 weeks   Patient will report at least 10% disability reduction from initial MDQ score to indicate clinically significant functional improvement  Patient will report at least 10 point increase on initial FOTO score to indicate clinically significant functional improvement  Patient will report 50% pain reduction     Long Term Goals: 12 weeks   Patient will report at least 20% disability reduction from initial MDQ score to indicate clinically significant functional improvement  Patient will report at least 20 point increase on initial FOTO score to indicate clinically significant functional improvement  Patient will report 50% overall perceived improvement  Patient will demonstrate independence and compliance with HEP  Plan     2-3x/week x 12 weeks    Nelly Smith, PT

## 2024-11-29 DIAGNOSIS — K21.9 GASTROESOPHAGEAL REFLUX DISEASE, UNSPECIFIED WHETHER ESOPHAGITIS PRESENT: ICD-10-CM

## 2024-12-02 ENCOUNTER — LAB VISIT (OUTPATIENT)
Dept: LAB | Facility: HOSPITAL | Age: 74
End: 2024-12-02
Attending: FAMILY MEDICINE
Payer: MEDICARE

## 2024-12-02 DIAGNOSIS — E11.649 TYPE 2 DIABETES MELLITUS WITH HYPOGLYCEMIA WITHOUT COMA, WITHOUT LONG-TERM CURRENT USE OF INSULIN: ICD-10-CM

## 2024-12-02 DIAGNOSIS — Z00.00 WELLNESS EXAMINATION: ICD-10-CM

## 2024-12-02 DIAGNOSIS — E78.00 HYPERCHOLESTEROLEMIA: ICD-10-CM

## 2024-12-02 DIAGNOSIS — Z12.5 ENCOUNTER FOR PROSTATE CANCER SCREENING: ICD-10-CM

## 2024-12-02 DIAGNOSIS — I10 PRIMARY HYPERTENSION: ICD-10-CM

## 2024-12-02 LAB
ALBUMIN SERPL-MCNC: 4 G/DL (ref 3.4–4.8)
ALBUMIN/GLOB SERPL: 1.2 RATIO (ref 1.1–2)
ALP SERPL-CCNC: 74 UNIT/L (ref 40–150)
ALT SERPL-CCNC: 21 UNIT/L (ref 0–55)
ANION GAP SERPL CALC-SCNC: 8 MEQ/L
AST SERPL-CCNC: 16 UNIT/L (ref 5–34)
BASOPHILS # BLD AUTO: 0.07 X10(3)/MCL
BASOPHILS NFR BLD AUTO: 0.9 %
BILIRUB SERPL-MCNC: 0.9 MG/DL
BUN SERPL-MCNC: 17 MG/DL (ref 8.4–25.7)
CALCIUM SERPL-MCNC: 10 MG/DL (ref 8.8–10)
CHLORIDE SERPL-SCNC: 107 MMOL/L (ref 98–107)
CHOLEST SERPL-MCNC: 87 MG/DL
CHOLEST/HDLC SERPL: 2 {RATIO} (ref 0–5)
CO2 SERPL-SCNC: 28 MMOL/L (ref 23–31)
CREAT SERPL-MCNC: 0.94 MG/DL (ref 0.72–1.25)
CREAT/UREA NIT SERPL: 18
EOSINOPHIL # BLD AUTO: 0.2 X10(3)/MCL (ref 0–0.9)
EOSINOPHIL NFR BLD AUTO: 2.6 %
ERYTHROCYTE [DISTWIDTH] IN BLOOD BY AUTOMATED COUNT: 13.2 % (ref 11.5–17)
EST. AVERAGE GLUCOSE BLD GHB EST-MCNC: 125.5 MG/DL
GFR SERPLBLD CREATININE-BSD FMLA CKD-EPI: >60 ML/MIN/1.73/M2
GLOBULIN SER-MCNC: 3.3 GM/DL (ref 2.4–3.5)
GLUCOSE SERPL-MCNC: 130 MG/DL (ref 82–115)
HBA1C MFR BLD: 6 %
HCT VFR BLD AUTO: 43 % (ref 42–52)
HDLC SERPL-MCNC: 36 MG/DL (ref 35–60)
HGB BLD-MCNC: 13.6 G/DL (ref 14–18)
IMM GRANULOCYTES # BLD AUTO: 0.02 X10(3)/MCL (ref 0–0.04)
IMM GRANULOCYTES NFR BLD AUTO: 0.3 %
LDLC SERPL CALC-MCNC: 39 MG/DL (ref 50–140)
LYMPHOCYTES # BLD AUTO: 1.39 X10(3)/MCL (ref 0.6–4.6)
LYMPHOCYTES NFR BLD AUTO: 18.2 %
MCH RBC QN AUTO: 27.8 PG (ref 27–31)
MCHC RBC AUTO-ENTMCNC: 31.6 G/DL (ref 33–36)
MCV RBC AUTO: 87.9 FL (ref 80–94)
MONOCYTES # BLD AUTO: 0.8 X10(3)/MCL (ref 0.1–1.3)
MONOCYTES NFR BLD AUTO: 10.5 %
NEUTROPHILS # BLD AUTO: 5.14 X10(3)/MCL (ref 2.1–9.2)
NEUTROPHILS NFR BLD AUTO: 67.5 %
NRBC BLD AUTO-RTO: 0 %
PLATELET # BLD AUTO: 180 X10(3)/MCL (ref 130–400)
PMV BLD AUTO: 10.3 FL (ref 7.4–10.4)
POTASSIUM SERPL-SCNC: 4.3 MMOL/L (ref 3.5–5.1)
PROT SERPL-MCNC: 7.3 GM/DL (ref 5.8–7.6)
PSA SERPL-MCNC: 2.66 NG/ML
RBC # BLD AUTO: 4.89 X10(6)/MCL (ref 4.7–6.1)
SODIUM SERPL-SCNC: 143 MMOL/L (ref 136–145)
TRIGL SERPL-MCNC: 62 MG/DL (ref 34–140)
VLDLC SERPL CALC-MCNC: 12 MG/DL
WBC # BLD AUTO: 7.62 X10(3)/MCL (ref 4.5–11.5)

## 2024-12-02 PROCEDURE — 80061 LIPID PANEL: CPT

## 2024-12-02 PROCEDURE — 80053 COMPREHEN METABOLIC PANEL: CPT

## 2024-12-02 PROCEDURE — 83036 HEMOGLOBIN GLYCOSYLATED A1C: CPT

## 2024-12-02 PROCEDURE — 84153 ASSAY OF PSA TOTAL: CPT

## 2024-12-02 PROCEDURE — 36415 COLL VENOUS BLD VENIPUNCTURE: CPT

## 2024-12-02 PROCEDURE — 85025 COMPLETE CBC W/AUTO DIFF WBC: CPT

## 2024-12-02 RX ORDER — OMEPRAZOLE 20 MG/1
20 CAPSULE, DELAYED RELEASE ORAL EVERY MORNING
Qty: 90 CAPSULE | Refills: 1 | Status: SHIPPED | OUTPATIENT
Start: 2024-12-02

## 2024-12-04 ENCOUNTER — CLINICAL SUPPORT (OUTPATIENT)
Dept: REHABILITATION | Facility: HOSPITAL | Age: 74
End: 2024-12-04
Payer: MEDICARE

## 2024-12-04 DIAGNOSIS — M54.40 CHRONIC BILATERAL LOW BACK PAIN WITH SCIATICA, SCIATICA LATERALITY UNSPECIFIED: Primary | ICD-10-CM

## 2024-12-04 DIAGNOSIS — G89.29 CHRONIC BILATERAL LOW BACK PAIN WITH SCIATICA, SCIATICA LATERALITY UNSPECIFIED: Primary | ICD-10-CM

## 2024-12-04 PROCEDURE — 97530 THERAPEUTIC ACTIVITIES: CPT | Mod: KX

## 2024-12-04 PROCEDURE — 97035 APP MDLTY 1+ULTRASOUND EA 15: CPT | Mod: KX

## 2024-12-04 PROCEDURE — 97140 MANUAL THERAPY 1/> REGIONS: CPT | Mod: KX

## 2024-12-04 PROCEDURE — 97110 THERAPEUTIC EXERCISES: CPT | Mod: KX

## 2024-12-04 NOTE — PROGRESS NOTES
Physical Therapy Treatment Note     Name: Scooter Donato  Clinic Number: [id number]    Therapy Diagnosis:   Encounter Diagnosis   Name Primary?    Chronic bilateral low back pain with sciatica, sciatica laterality unspecified Yes     Physician: Lo Qureshi NP    Visit Date: 12/4/2024    Physician Orders: PT Eval and Treat  Medical Diagnosis from Referral: groin pain and dysfunction  Evaluation Date: 10/15/2024  Authorization Period Expiration: medically necessary  Plan of Care Expiration: 1/15/2025  Visit # / Visits authorized: 6/ medically necessary     Time In: 725  Time Out: 855  Total Appointment Time: 90 minutes    Subjective     Patient reports: he has missed several visits due to family and scheduling conflicts.  He says his low back continues to feel better, but his shoulder pain persists B, right worse than left.  He had trouble pulling in crawfish cages yesterday.    CMS Impairment/Limitation/Restriction for FOTO Survey  Therapist reviewed FOTO scores for Scooter Donato on 10/15/2024. FOTO documents entered into [facility] [facility] - see Media section.  Patient's Physical FS Primary Measure: 47 (57 on 12/4)  Risk Adjusted Statistical FOTO: 53  Limitation Score: 53%  Category: Mobility  MDQ: 38% disability (22% on 12/4)    Objective     Eval 10/15/2024   Pain:     9/10 low back pain  9-10/10 in left groin      Posture:      Hypertrophy along right paraspinals, elevated right shoulder, lateral shift left at TL junction   Gait Analysis:      Antalgic gait pattern w decreased stance phase left.  Left sacral  lateral lean to left w pelvic height difference, right pelvis elevated.      Palpation     Pain at low back along L5/S1 line at B SIJ.  Decreased movement w PA glides along entire spine.  No pain w PA glides throughout spine.     Point tenderness at left hip flexor origins and along adductor and rectus musculature from hip along medial thigh to knee.      Dermatomes     Numbness at right  anterior thigh, no other  radiating symptoms.      Myotomes     Abdominal core - 2/5 (abdominal hernia obvious)        HIP FLX - Right 5/5, Left 4-/5  HIP EXT - Right 3-/5, Left 3-/5     HIP ABD - Right 3-/5, Left 3-/5  HIP ADD - Right 3/5, Left 2/5  KNEE FLX - Right 3-/5, Left 2/5  KNEE EXT - Right 5/5, Left 4/5  ANKLE DF - Right 5/5, Left 4/5  ANKLE PF - Right NT/5, Left NT/5         Hip/SI Clearance     AISHA: pos B  FADDIR: pos B     SACRAL DISTRACTION: inconclusive  SACRAL THIGH THRUST: pos B  SACRAL THRUST: inconclusive B  SACRAL COMPRESSION: pos B      AROM lumbar     Flexion: 0  Extension: 10     Pain with extension      Flexibility     Rectus: 80 R, 80 L  Cesar Test: max restr L  90/90 Hamstrin L, 75 R  Piriformis Test: mod restr R, mod/max restr L      Special Tests        SLR: neg B         Functional Testing     5x STS = 20 sec  2 MWT = 300' without AD     Sit to stand = mod difficulty w UE assist  Bed mobility = max difficulty  Transitional movements = mod difficulty     Balance     Tandem R forward = NT  Tandem L forward = NT     Bereket received the following treatment:     Time Activities   Manual 10 min STM and stretching at hip girdle   TherAct 40 min Standing functional step ups and NuStep   TherEx 20 min Core and hip girdle muscle balance restoration   Gait     Neuro Re-ed     Modalities 20 min MH prior, ice at end of session, US to B shoulder   E-Stim     Dry Needling     Canalith Repositioning       Charges based on one on one time.    Home Exercises Provided and Patient Education Provided     Education provided:   -Plan of care, HEP, walking    Assessment     Exercise focus on prone positioning w hip and HS recruitment, quad stretch with good response.  Supine core recruitment and hip girdle strengthening tolerated.  VC for correct core use.  Hip flexibility improving.  Advanced standing step ups and proprioceptive activity again today.      Able to tolerate full exercise program and functional standing activity  with decreased reports of pain.  NuStep x 10 minutes.    Increased ease with functional mobility noted. Hip abduction improving.      Modalities and exercise at shoulders with reports of relief at end of session.    Will continue to progress functional activity tolerance as able. Patient in agreement with plan.    Patient prognosis is Good.      Anticipated barriers to physical therapy: work scheduling conflicts    Goals: Bereket Is progressing well towards his goals.  Short Term Goals: 6 weeks   Patient will report at least 10% disability reduction from initial MDQ score to indicate clinically significant functional improvement  Patient will report at least 10 point increase on initial FOTO score to indicate clinically significant functional improvement  Patient will report 50% pain reduction     Long Term Goals: 12 weeks   Patient will report at least 20% disability reduction from initial MDQ score to indicate clinically significant functional improvement  Patient will report at least 20 point increase on initial FOTO score to indicate clinically significant functional improvement  Patient will report 50% overall perceived improvement  Patient will demonstrate independence and compliance with HEP  Plan     2-3x/week x 12 weeks    Nelly Smith, PT

## 2024-12-08 ENCOUNTER — HOSPITAL ENCOUNTER (EMERGENCY)
Facility: HOSPITAL | Age: 74
Discharge: HOME | End: 2024-12-08
Attending: EMERGENCY MEDICINE
Payer: MEDICARE

## 2024-12-08 VITALS
OXYGEN SATURATION: 98 % | TEMPERATURE: 99 F | BODY MASS INDEX: 27.49 KG/M2 | DIASTOLIC BLOOD PRESSURE: 78 MMHG | RESPIRATION RATE: 18 BRPM | SYSTOLIC BLOOD PRESSURE: 155 MMHG | WEIGHT: 165 LBS | HEIGHT: 65 IN | HEART RATE: 81 BPM

## 2024-12-08 DIAGNOSIS — E16.2 HYPOGLYCEMIA: Primary | ICD-10-CM

## 2024-12-08 LAB
ALBUMIN SERPL-MCNC: 3.9 G/DL (ref 3.4–4.8)
ALBUMIN/GLOB SERPL: 1.2 RATIO (ref 1.1–2)
ALP SERPL-CCNC: 68 UNIT/L (ref 40–150)
ALT SERPL-CCNC: 21 UNIT/L (ref 0–55)
ANION GAP SERPL CALC-SCNC: 12 MEQ/L
AST SERPL-CCNC: 16 UNIT/L (ref 5–34)
BASOPHILS # BLD AUTO: 0.07 X10(3)/MCL
BASOPHILS NFR BLD AUTO: 1.1 %
BILIRUB SERPL-MCNC: 0.4 MG/DL
BUN SERPL-MCNC: 25 MG/DL (ref 8.4–25.7)
CALCIUM SERPL-MCNC: 9.5 MG/DL (ref 8.8–10)
CHLORIDE SERPL-SCNC: 107 MMOL/L (ref 98–107)
CO2 SERPL-SCNC: 25 MMOL/L (ref 23–31)
CREAT SERPL-MCNC: 0.94 MG/DL (ref 0.72–1.25)
CREAT/UREA NIT SERPL: 27
EOSINOPHIL # BLD AUTO: 0.39 X10(3)/MCL (ref 0–0.9)
EOSINOPHIL NFR BLD AUTO: 6.1 %
ERYTHROCYTE [DISTWIDTH] IN BLOOD BY AUTOMATED COUNT: 13.2 % (ref 11.5–17)
GFR SERPLBLD CREATININE-BSD FMLA CKD-EPI: >60 ML/MIN/1.73/M2
GLOBULIN SER-MCNC: 3.2 GM/DL (ref 2.4–3.5)
GLUCOSE SERPL-MCNC: 100 MG/DL (ref 82–115)
HCT VFR BLD AUTO: 40.9 % (ref 42–52)
HGB BLD-MCNC: 13.4 G/DL (ref 14–18)
IMM GRANULOCYTES # BLD AUTO: 0.01 X10(3)/MCL (ref 0–0.04)
IMM GRANULOCYTES NFR BLD AUTO: 0.2 %
LYMPHOCYTES # BLD AUTO: 2.3 X10(3)/MCL (ref 0.6–4.6)
LYMPHOCYTES NFR BLD AUTO: 36.1 %
MCH RBC QN AUTO: 28.5 PG (ref 27–31)
MCHC RBC AUTO-ENTMCNC: 32.8 G/DL (ref 33–36)
MCV RBC AUTO: 87 FL (ref 80–94)
MONOCYTES # BLD AUTO: 0.88 X10(3)/MCL (ref 0.1–1.3)
MONOCYTES NFR BLD AUTO: 13.8 %
NEUTROPHILS # BLD AUTO: 2.73 X10(3)/MCL (ref 2.1–9.2)
NEUTROPHILS NFR BLD AUTO: 42.7 %
NRBC BLD AUTO-RTO: 0 %
PLATELET # BLD AUTO: 175 X10(3)/MCL (ref 130–400)
PMV BLD AUTO: 9.8 FL (ref 7.4–10.4)
POCT GLUCOSE: 84 MG/DL (ref 70–110)
POTASSIUM SERPL-SCNC: 3.9 MMOL/L (ref 3.5–5.1)
PROT SERPL-MCNC: 7.1 GM/DL (ref 5.8–7.6)
RBC # BLD AUTO: 4.7 X10(6)/MCL (ref 4.7–6.1)
SODIUM SERPL-SCNC: 144 MMOL/L (ref 136–145)
WBC # BLD AUTO: 6.38 X10(3)/MCL (ref 4.5–11.5)

## 2024-12-08 PROCEDURE — 85025 COMPLETE CBC W/AUTO DIFF WBC: CPT | Performed by: EMERGENCY MEDICINE

## 2024-12-08 PROCEDURE — 99283 EMERGENCY DEPT VISIT LOW MDM: CPT

## 2024-12-08 PROCEDURE — 80053 COMPREHEN METABOLIC PANEL: CPT | Performed by: EMERGENCY MEDICINE

## 2024-12-08 PROCEDURE — 82962 GLUCOSE BLOOD TEST: CPT

## 2024-12-09 ENCOUNTER — CLINICAL SUPPORT (OUTPATIENT)
Dept: REHABILITATION | Facility: HOSPITAL | Age: 74
End: 2024-12-09
Payer: MEDICARE

## 2024-12-09 DIAGNOSIS — M54.40 CHRONIC BILATERAL LOW BACK PAIN WITH SCIATICA, SCIATICA LATERALITY UNSPECIFIED: Primary | ICD-10-CM

## 2024-12-09 DIAGNOSIS — G89.29 CHRONIC BILATERAL LOW BACK PAIN WITH SCIATICA, SCIATICA LATERALITY UNSPECIFIED: Primary | ICD-10-CM

## 2024-12-09 PROCEDURE — 97110 THERAPEUTIC EXERCISES: CPT | Mod: KX

## 2024-12-09 PROCEDURE — 97530 THERAPEUTIC ACTIVITIES: CPT | Mod: KX

## 2024-12-09 NOTE — PROGRESS NOTES
Physical Therapy Treatment Note     Name: Scooter Donato  Clinic Number: [id number]    Therapy Diagnosis:   Encounter Diagnosis   Name Primary?    Chronic bilateral low back pain with sciatica, sciatica laterality unspecified Yes     Physician: Lo Qureshi NP    Visit Date: 12/9/2024    Physician Orders: PT Eval and Treat  Medical Diagnosis from Referral: groin pain and dysfunction  Evaluation Date: 10/15/2024  Authorization Period Expiration: medically necessary  Plan of Care Expiration: 1/15/2025  Visit # / Visits authorized: 7/ medically necessary     Time In: 733  Time Out: 847  Total Appointment Time: 69 minutes    Subjective     Patient reports: he is feeling much better.  He was able to sleep 6 hours last night. He does report a bout of low blood sugar last night and an ER visit, but it came up on its own and he is stable this morning.    CMS Impairment/Limitation/Restriction for FOTO Survey  Therapist reviewed FOTO scores for Scooter Donato on 10/15/2024. FOTO documents entered into [employer] - see Media section.  Patient's Physical FS Primary Measure: 47 (57 on 12/4)  Risk Adjusted Statistical FOTO: 53  Limitation Score: 53%  Category: Mobility  MDQ: 38% disability (22% on 12/4)    Objective     Eval 10/15/2024   Pain:     9/10 low back pain  9-10/10 in left groin      Posture:      Hypertrophy along right paraspinals, elevated right shoulder, lateral shift left at TL junction   Gait Analysis:      Antalgic gait pattern w decreased stance phase left.  Left sacral  lateral lean to left w pelvic height difference, right pelvis elevated.      Palpation     Pain at low back along L5/S1 line at B SIJ.  Decreased movement w PA glides along entire spine.  No pain w PA glides throughout spine.     Point tenderness at left hip flexor origins and along adductor and rectus musculature from hip along medial thigh to knee.      Dermatomes     Numbness at right  anterior thigh, no other radiating symptoms.       Myotomes     Abdominal core - 2/5 (abdominal hernia obvious)        HIP FLX - Right 5/5, Left 4-/5  HIP EXT - Right 3-/5, Left 3-/5     HIP ABD - Right 3-/5, Left 3-/5  HIP ADD - Right 3/5, Left 2/5  KNEE FLX - Right 3-/5, Left 2/5  KNEE EXT - Right 5/5, Left 4/5  ANKLE DF - Right 5/5, Left 4/5  ANKLE PF - Right NT/5, Left NT/5         Hip/SI Clearance     AISHA: pos B  FADDIR: pos B     SACRAL DISTRACTION: inconclusive  SACRAL THIGH THRUST: pos B  SACRAL THRUST: inconclusive B  SACRAL COMPRESSION: pos B      AROM lumbar     Flexion: 0  Extension: 10     Pain with extension      Flexibility     Rectus: 80 R, 80 L  Cesar Test: max restr L  90/90 Hamstrin L, 75 R  Piriformis Test: mod restr R, mod/max restr L      Special Tests        SLR: neg B         Functional Testing     5x STS = 20 sec  2 MWT = 300' without AD     Sit to stand = mod difficulty w UE assist  Bed mobility = max difficulty  Transitional movements = mod difficulty     Balance     Tandem R forward = NT  Tandem L forward = NT     Bereket received the following treatment:     Time Activities   Manual     TherAct 23 min Standing functional step ups and NuStep   TherEx 30 min Core and hip girdle muscle balance restoration   Gait     Neuro Re-ed     Modalities 16 min MH prior, ice at end of session   E-Stim     Dry Needling     Canalith Repositioning       Charges based on one on one time.    Home Exercises Provided and Patient Education Provided     Education provided:   -Plan of care, HEP, walking    Assessment     Exercise focus on prone positioning w hip and HS recruitment, quad stretch with good response.  Supine core recruitment and hip girdle strengthening tolerated.  VC for correct core use.  Hip flexibility improving.  Advanced standing step ups and proprioceptive activity again today.      Able to tolerate full exercise program and functional standing activity with decreased reports of pain.  NuStep x 10 minutes.    Increased ease with  functional mobility noted. Hip abduction improving.      Modalities and exercise at shoulders with reports of relief at end of session.    Will continue to progress functional activity tolerance as able. Discharge discussion initiated. Patient in agreement with plan.    Patient will continue to benefit from therapy for progression of AROM at hips and shoulders, increased LE strengthening, improved balance and increased activity tolerance.  He is hoping to return to work next month.    Patient prognosis is Good.      Anticipated barriers to physical therapy: work scheduling conflicts    Goals: Bereket Is progressing well towards his goals.  Short Term Goals: 6 weeks   Patient will report at least 10% disability reduction from initial MDQ score to indicate clinically significant functional improvement  Patient will report at least 10 point increase on initial FOTO score to indicate clinically significant functional improvement  Patient will report 50% pain reduction     Long Term Goals: 12 weeks   Patient will report at least 20% disability reduction from initial MDQ score to indicate clinically significant functional improvement  Patient will report at least 20 point increase on initial FOTO score to indicate clinically significant functional improvement  Patient will report 50% overall perceived improvement  Patient will demonstrate independence and compliance with HEP  Plan     2-3x/week x 12 weeks    Nelly Smith, PT

## 2024-12-09 NOTE — ED PROVIDER NOTES
Encounter Date: 12/8/2024       History     Chief Complaint   Patient presents with    Blood Sugar Problem     States tonight his Kristie monitor was reading low.  No other complaints     Patient is a 74-year-old male presenting to the ER secondary to hypoglycemia.  Patient states prior to arrival his blood sugar was in the lower 60s.  He does state he ate a banana after this happened.  On arrival to the ER, patient's blood sugar is in the 80s.  Patient states he was not symptomatic when his blood sugar was in the 60s.  He denies any fever or chills.  He denies cough or shortness of breath.  Patient is not on insulin.      Review of patient's allergies indicates:  No Known Allergies  Past Medical History:   Diagnosis Date    Allergic rhinitis     Anxiety disorder, unspecified     Asthma     Asthmatic bronchitis     Atherosclerosis of aorta     Chronic inflammatory arthritis     Claudication     Diabetic neuropathy     Dysphagia     Family history of aortic aneurysm     GERD (gastroesophageal reflux disease)     Heart murmur     HLD (hyperlipidemia)     HTN (hypertension)     Hypercalcemia     Hyperparathyroidism     Hypoparathyroidism, unspecified     Lower extremity edema     Male erectile dysfunction, unspecified     Multiple actinic keratoses     Nonrheumatic mitral (valve) insufficiency     Osteoarthritis     Personal history of colonic polyps 2017    Tubular adenoma 2017    Type 2 diabetes mellitus     Vitamin D deficiency      Past Surgical History:   Procedure Laterality Date    APPENDECTOMY      COLONOSCOPY  12/05/2022    Dr Himanshu Prajapati - [place], [place]    CYSTOSCOPY  2012    TONSILLECTOMY       Family History   Problem Relation Name Age of Onset    Lung cancer Mother      Hypertension Mother      Dementia Father      Kidney disease Father      Hypertension Father      Diabetes Maternal Grandmother      Diabetes Paternal Grandmother       Social History     Tobacco Use    Smoking status: Never    Smokeless  tobacco: Never   Substance Use Topics    Alcohol use: Yes    Drug use: Never     Review of Systems   Constitutional: Negative.    HENT: Negative.     Respiratory: Negative.     Cardiovascular: Negative.    Gastrointestinal: Negative.    Genitourinary: Negative.    Musculoskeletal: Negative.    Neurological: Negative.    Psychiatric/Behavioral: Negative.         Physical Exam     Initial Vitals [12/1950]   BP Pulse Resp Temp SpO2   (!) 177/76 81 18 98.5 °F (36.9 °C) 98 %      MAP       --         Physical Exam    Nursing note and vitals reviewed.  Constitutional: He appears well-developed and well-nourished.   Patient is alert oriented and in no apparent distress.   HENT:   Head: Normocephalic.   Neck: Neck supple.   Normal range of motion.  Cardiovascular:  Normal rate and regular rhythm.           Murmur heard.  Pulmonary/Chest: Breath sounds normal. No respiratory distress. He has no wheezes. He has no rhonchi. He has no rales.   Abdominal: Abdomen is soft. He exhibits no distension. There is no abdominal tenderness. There is no guarding.   Musculoskeletal:         General: Normal range of motion.      Cervical back: Normal range of motion and neck supple.     Neurological: He is alert and oriented to person, place, and time.   Skin: Skin is warm and dry.         ED Course   Procedures  Labs Reviewed   CBC WITH DIFFERENTIAL - Abnormal       Result Value    WBC 6.38      RBC 4.70      Hgb 13.4 (*)     Hct 40.9 (*)     MCV 87.0      MCH 28.5      MCHC 32.8 (*)     RDW 13.2      Platelet 175      MPV 9.8      Neut % 42.7      Lymph % 36.1      Mono % 13.8      Eos % 6.1      Basophil % 1.1      Lymph # 2.30      Neut # 2.73      Mono # 0.88      Eos # 0.39      Baso # 0.07      IG# 0.01      IG% 0.2      NRBC% 0.0     CBC W/ AUTO DIFFERENTIAL    Narrative:     The following orders were created for panel order CBC auto differential.  Procedure                               Abnormality         Status                      ---------                               -----------         ------                     CBC with Differential[[id number]]       Abnormal            Final result                 Please view results for these tests on the individual orders.   COMPREHENSIVE METABOLIC PANEL    Sodium 144      Potassium 3.9      Chloride 107      CO2 25      Glucose 100      Blood Urea Nitrogen 25.0      Creatinine 0.94      Calcium 9.5      Protein Total 7.1      Albumin 3.9      Globulin 3.2      Albumin/Globulin Ratio 1.2      Bilirubin Total 0.4      ALP 68      ALT 21      AST 16      eGFR >60      Anion Gap 12.0      BUN/Creatinine Ratio 27     POCT GLUCOSE    POCT Glucose 84            Imaging Results    None          Medications - No data to display  Medical Decision Making  Differential diagnosis: Hypoglycemia, history of diabetes    Amount and/or Complexity of Data Reviewed  Labs: ordered.     Details: All labs are stable  Discussion of management or test interpretation with external provider(s): At time of discharge, patient denies any complaints.  Labs are stable.  Will DC home.               ED Course as of 12/08/24 2048   Sun Dec 08, 2024   2039 Patient remains in no apparent distress. [KG]   2039 Blood pressure is trending down, at time of discharge it is 155/78.  Patient denies any complaints [KG]      ED Course User Index  [KG] Beto Wheeler MD                           Clinical Impression:  Final diagnoses:  [E16.2] Hypoglycemia (Primary)          ED Disposition Condition    Discharge Stable          ED Prescriptions    None       Follow-up Information       Follow up With Specialties Details Why Contact Info    John Ford, DO Family Medicine In 3 days  [address] [facility] [address]  [phone]               Beto Wheeler MD  12/08/24 2048

## 2024-12-10 ENCOUNTER — OFFICE VISIT (OUTPATIENT)
Dept: FAMILY MEDICINE | Facility: CLINIC | Age: 74
End: 2024-12-10
Payer: MEDICARE

## 2024-12-10 VITALS
TEMPERATURE: 98 F | DIASTOLIC BLOOD PRESSURE: 73 MMHG | HEIGHT: 65 IN | WEIGHT: 173.38 LBS | SYSTOLIC BLOOD PRESSURE: 127 MMHG | BODY MASS INDEX: 28.89 KG/M2 | HEART RATE: 71 BPM | RESPIRATION RATE: 16 BRPM | OXYGEN SATURATION: 99 %

## 2024-12-10 DIAGNOSIS — E11.65 TYPE 2 DIABETES MELLITUS WITH HYPERGLYCEMIA, WITHOUT LONG-TERM CURRENT USE OF INSULIN: Chronic | ICD-10-CM

## 2024-12-10 DIAGNOSIS — I10 PRIMARY HYPERTENSION: ICD-10-CM

## 2024-12-10 DIAGNOSIS — Z00.00 MEDICARE ANNUAL WELLNESS VISIT, SUBSEQUENT: Primary | ICD-10-CM

## 2024-12-10 DIAGNOSIS — E78.00 HYPERCHOLESTEROLEMIA: ICD-10-CM

## 2024-12-10 PROCEDURE — G0439 PPPS, SUBSEQ VISIT: HCPCS | Mod: ,,,

## 2024-12-10 RX ORDER — ATORVASTATIN CALCIUM 10 MG/1
10 TABLET, FILM COATED ORAL NIGHTLY
Qty: 90 TABLET | Refills: 1 | Status: SHIPPED | OUTPATIENT
Start: 2024-12-10 | End: 2025-06-08

## 2024-12-10 RX ORDER — BLOOD-GLUCOSE SENSOR
1 EACH MISCELLANEOUS
Qty: 5 EACH | Refills: 10 | Status: SHIPPED | OUTPATIENT
Start: 2024-12-10 | End: 2025-12-10

## 2024-12-10 RX ORDER — METFORMIN HYDROCHLORIDE 500 MG/1
1000 TABLET, EXTENDED RELEASE ORAL 2 TIMES DAILY WITH MEALS
Qty: 360 TABLET | Refills: 1 | Status: SHIPPED | OUTPATIENT
Start: 2024-12-10 | End: 2025-06-08

## 2024-12-10 RX ORDER — EZETIMIBE 10 MG/1
10 TABLET ORAL NIGHTLY
Qty: 90 TABLET | Refills: 1 | Status: SHIPPED | OUTPATIENT
Start: 2024-12-10 | End: 2025-06-08

## 2024-12-10 NOTE — PROGRESS NOTES
"   Primary Care     Scooter Donato is a 74 y.o. male here today for a Medicare Annual Wellness visit and comprehensive Health Risk Assessment.   Reviewed and discussed lab results.     Subjective   The following components were reviewed and updated:  Medical history  Family History  Social history  Allergies  Current Medications  Immunizations  Health Maintenance  Patient Care Team    Review of Systems  A comprehensive review of systems was conducted and is negative except as noted above.     Objective   Visit Vitals  /73 (BP Location: Right arm, Patient Position: Sitting)   Pulse 71   Temp 98.4 °F (36.9 °C) (Oral)   Resp 16   Ht 5' 5" (1.651 m)   Wt 78.7 kg (173 lb 6.4 oz)   SpO2 99%   BMI 28.86 kg/m²        Physical Exam  Vitals and nursing note reviewed.   Constitutional:       General: He is not in acute distress.     Appearance: He is not ill-appearing.   HENT:      Head: Normocephalic and atraumatic.      Mouth/Throat:      Mouth: Mucous membranes are moist.      Pharynx: Oropharynx is clear.   Eyes:      General: No scleral icterus.     Extraocular Movements: Extraocular movements intact.      Conjunctiva/sclera: Conjunctivae normal.      Pupils: Pupils are equal, round, and reactive to light.   Neck:      Vascular: No carotid bruit.   Cardiovascular:      Rate and Rhythm: Normal rate and regular rhythm.      Heart sounds: No murmur heard.     No friction rub. No gallop.   Pulmonary:      Effort: Pulmonary effort is normal. No respiratory distress.      Breath sounds: Normal breath sounds. No wheezing, rhonchi or rales.   Abdominal:      General: Abdomen is flat. Bowel sounds are normal. There is no distension.      Palpations: Abdomen is soft. There is no mass.      Tenderness: There is no abdominal tenderness.   Musculoskeletal:         General: Normal range of motion.      Cervical back: Normal range of motion and neck supple.   Skin:     General: Skin is warm and dry.   Neurological:      General: " No focal deficit present.      Mental Status: He is alert.   Psychiatric:         Mood and Affect: Mood normal.          Assessment/Plan:  1. Medicare annual wellness visit, subsequent    2. Hypercholesterolemia  -     ezetimibe (ZETIA) 10 mg tablet; Take 1 tablet (10 mg total) by mouth every evening.  Dispense: 90 tablet; Refill: 1  -     atorvastatin (LIPITOR) 10 MG tablet; Take 1 tablet (10 mg total) by mouth every evening.  Dispense: 90 tablet; Refill: 1  -     Comprehensive Metabolic Panel; Future; Expected date: 06/09/2025  -     Lipid Panel; Future; Expected date: 06/09/2025    3. Type 2 diabetes mellitus with hyperglycemia, without long-term current use of insulin  Assessment & Plan:  Lab Results   Component Value Date    HGBA1C 6.0 12/02/2024    HGBA1C 6.0 07/31/2024    HGBA1C 6.8 (H) 10/19/2022    LDL 39.00 (L) 12/02/2024    CREATININE 0.94 12/08/2024      Well controlled.   Continue Metformin 1000 BID + Continue Ozempic 0.5 mg weekly.   Recheck A1C in 6 months.   Follow ADA Diet. Avoid soda, simple sweets, and limit rice/pasta/breads/starches (no more than 45-50 grams per meal).  Maintain healthy weight with goal BMI <30.  Exercise 5 times per week for 30 minutes per day.    Orders:  -     metFORMIN (GLUCOPHAGE-XR) 500 MG ER 24hr tablet; Take 2 tablets (1,000 mg total) by mouth 2 (two) times daily with meals.  Dispense: 360 tablet; Refill: 1  -     blood-glucose sensor (FREESTYLE MADDIE 3 SENSOR) Crystal; 1 each by Misc.(Non-Drug; Combo Route) route every 14 (fourteen) days.  Dispense: 5 each; Refill: 10  -     Hemoglobin A1C; Future; Expected date: 06/09/2025  -     Comprehensive Metabolic Panel; Future; Expected date: 06/09/2025  -     Lipid Panel; Future; Expected date: 06/09/2025  -     Microalbumin/Creatinine Ratio, Urine; Future; Expected date: 06/09/2025    4. Primary hypertension  Assessment & Plan:  Well controlled.   Continue Valsartan 320 mg.   Low Sodium Diet (DASH Diet - Less than 2 grams of  sodium per day).  Monitor blood pressure daily and log. Report consistent numbers greater than 140/90.  Exercise as tolerated.         A comprehensive HEALTH RISK ASSESSMENT was completed today. Results are summarized below:    There are NO EMOTIONAL/SOCIAL CONCERNS identified on today's screening for Social Isolation, Depression and Anxiety.    There are NO COGNITIVE FUNCTION CONCERNS identified on today's screening.  The following FUNCTIONAL AND/OR SAFETY CONCERNS were identified on today's screening for Physical Symptoms, Nutritional, Home Safety/Living Situation, Fall Risk, Activities of Daily Living, Independent Activities of Daily Living, Physical Activity,Timed Up and Go test and Whisper test::   *Patient reports he NEEDS ASSISTANCE WITH SOME INDEPENDENT ACTIVITIES OF DAILY LIVING. (Do you need help with phone, transportation, shopping, preparing meals, housework, laundry, meds, or managing money?: (!) Yes)  *Patient reports NO PREVENTIVE HOME HAZARD EVALUATION OR MODIFICATION. (Have you or someone else evaluated or modified your home with additional safety features like handrails on all stairs, installed grab bars in the bathroom, secured loose rugs and ensured good lighting in all areas?: (!) No)    The patient reports NO OPIOID PRESCRIPTIONS. This was confirmed through medication reconciliation and the  website.    The patient is NOT A TOBACCO USER.        All Questions regarding food, transportation or housing were not answered today.    I provided Scooter Donato with a 5-10 year written Screening Schedule per USPSTF age appropriate recommendations and a Personal Prevention Plan based on the results of today's Health Risk Assessment. Education, counseling, and referrals were provided as documented above and can be viewed in the After Visit Summary.    Follow up in about 6 months (around 6/10/2025) for DM II Follow Up. In addition to this scheduled follow up, the patient has also been instructed to  follow up on as needed basis.     Advance Care Planning     Date: 12/10/2024  Patient did not wish or was not able to name a surrogate decision maker or provide an Advance Care Plan.

## 2024-12-10 NOTE — ASSESSMENT & PLAN NOTE
Well controlled.   Continue Valsartan 320 mg.   Low Sodium Diet (DASH Diet - Less than 2 grams of sodium per day).  Monitor blood pressure daily and log. Report consistent numbers greater than 140/90.  Exercise as tolerated.

## 2024-12-10 NOTE — PATIENT INSTRUCTIONS
Medicare Annual Wellness Visit      Patient Name: Scooter Donato  Today's Date: 12/10/2024    Below you will find your 5-10 year Screening Plan Recommendations:  Health Maintenance       Date Due Completion Date    TETANUS VACCINE Never done ---    RSV Vaccine (Age 60+ and Pregnant patients) (1 - 1-dose 75+ series) Never done ---    COVID-19 Vaccine (6 - 2024-25 season) 09/01/2024 5/22/2022    Diabetes Urine Screening 03/07/2025 3/7/2024    Eye Exam 04/30/2025 4/30/2024    Hemoglobin A1c 06/02/2025 12/2/2024    Lipid Panel 12/02/2025 12/2/2024          Below is your summarized Personalized Prevention Plan that addresses any concerns we discussed today at your visit. Please see attached detailed information specific to your Health Concerns.  Orders Placed This Encounter   Procedures    Hemoglobin A1C    Comprehensive Metabolic Panel    Lipid Panel    Microalbumin/Creatinine Ratio, Urine         The following information is provided to all patients.  This information is to help you find additional resources for any problems that may be affecting your health: Living healthy guide: www.[facility].[place].[facility]      Understanding Diabetes: www.diabetes.org      Eating healthy: www.cdc.gov/healthyweight      CDC home safety checklist: www.cdc.gov/steadi/patient.html      Agency on Aging: www.goea.[place].gov      Alcoholics anonymous (AA): www.aa.org      Physical Activity: www.rhonda.nih.gov/jt9wlao      Tobacco use: www.quitwithusla.org                                 Patient Education       Preventing Falls in the Older Adult   About this topic   A fall is the sudden loss of balance that causes a person to drop to the ground or floor. Falls are a serious health risk and they happen more often as we get older. Many things may increase your risk of falling, like:  Problems that come with getting older  Muscle weakness  Balance problems  More trouble seeing  Personal health factors  Health conditions such as arthritis,  Parkinson's disease, low blood pressure, or stroke  Medicines you take  Loss of feeling in your feet  Being less active  Taking drugs that makes you dizzy or drowsy  Habits like alcohol use  Things around your house  Slippery floors  Unsecured rugs  Stairs  Wearing improper fitting shoes  Areas where it is dark and difficult to see  Incorrect size or type of assistive devices  Clutter and items on the floor that block your walkway     What will the results be?   Prevent future falls  Avoid injuries and disabilities  Improve overall health  Will there be any other care needed?   Ask your doctor if you need to take vitamin D to help keep your bones strong.  Make your home safer. Get rid of things that might make you trip or slip. These are things like loose rugs, electrical cords, or clutter. Add grab bars, a shower seat, and handrails.  Wear sturdy shoes that fit well. Shoes should fit well, have a low heel, and the soles of the shoe should not be slippery. Walking in socks or with bare feet can raise your chance for falling.  Stay active. Walk, garden, swim, or do something active on a regular basis. These activities may prevent you from getting hurt if you do fall. They also help with your strength and balance.  Use a cane, walker, or other safety device. Be sure it is the right size for you and that you know how to use it safely. Be sure to wear your eyeglasses if they have been ordered for you.  Get up slowly after you sit or lie down. Try to change positions slowly.  What to do if you fall:  Stay calm and do not panic.  Look for signs to decide if you have been hurt or have an injury.  If you think you can get up safely, try to get up.  If you are hurt or cannot get up on your own, try to get help.  If no one is available to help, try to get comfortable and wait for someone to arrive who can help you.  Stay warm and move regularly as you are able. Avoid putting too much pressure on any one area.  After a fall,  tell family and friends that you have fallen. It is also important to talk to your doctor about your fall right away.  What problems could happen?   A fall can lead to broken bones and other serious injuries in older adults. Problems that happen because of a fall may even lead to death in older adults. Many people are not able to return to their former level of activity after a fall.  What can be done to prevent this health problem?   Lower Your Risk of Falling  Wear your eyeglasses. Have regular eye checkups. Do not use reading glasses when you walk around.  Quit smoking and limit alcohol intake. Smoking and too much alcohol can decrease bone mass and increase the chance of broken bones.  Know the side effects of the drugs you are taking. Some drugs may affect your balance and cause confusion or sleepiness.  Get up slowly after you sit or lie down. Do not change positions quickly. Do not rush when you need to go to the bathroom or to answer the phone.  Stay Physically Active  Be physically active. This will help to improve your strength and balance.  Fear of falling may lead you to avoid activities. Talk to your doctor. You may be sent to a physical therapist. This person can help you improve balance and build your confidence. Getting rid of your fear of falling can help you stay active and prevent future falls.  Join an exercise program. Ask your doctor what exercise is safe for you. Be sure to ask before you do any exercises, especially if you have illnesses like arthritis. Exercise can help you keep muscles strong and help with your balance. It is also a good way to learn proper ways to do each activity or exercise.  Safety Tips at Home  Keep your floors and walking areas clear from clutter. Remove furniture that blocks your way. Secure cords and wires near the wall to avoid tripping over them. Get rid of throw rugs.  Be sure the lights in your house are working well and provide good lighting throughout your  home. Make sure you can reach switches and lamps easily. Place a lamp close to your bed that is easy to reach.  Fix all steps and sidewalks to make them smooth and even. Put handrails and lights on stairs.  Keep all the things you use often on low shelves or in cabinets that are at about waist level. Ask for help to move items off of high shelves. Do not use a chair as a step stool.  Keep your bathroom area safe. Use nonslip rubber mats on the floor and in the tub or shower.  Keep a phone near you in case of emergency. Keep a list of your emergency contact numbers in large print near your phone. Carry a phone with you when you go for a walk. Consider using a personal alarm device that could call for help in case you fall and cannot get up.  Think about protecting your hip. Hip protectors may be needed if you have a higher chance for falling. Ask your doctor about this.  Where can I learn more?   American Academy of Family Physicians  https://familydoctor.org/wzqqm-idx-uj-lower-your-risk/   NHS  https://www.nhs.uk/conditions/falls/prevention/   Last Reviewed Date   2021-06-07  Consumer Information Use and Disclaimer   This information is not specific medical advice and does not replace information you receive from your health care provider. This is only a brief summary of general information. It does NOT include all information about conditions, illnesses, injuries, tests, procedures, treatments, therapies, discharge instructions or life-style choices that may apply to you. You must talk with your health care provider for complete information about your health and treatment options. This information should not be used to decide whether or not to accept your health care providers advice, instructions or recommendations. Only your health care provider has the knowledge and training to provide advice that is right for you.  Copyright   Copyright © 2021 [employer] and its affiliates and/or licensors. All rights  reserved.

## 2024-12-10 NOTE — ASSESSMENT & PLAN NOTE
Lab Results   Component Value Date    HGBA1C 6.0 12/02/2024    HGBA1C 6.0 07/31/2024    HGBA1C 6.8 (H) 10/19/2022    LDL 39.00 (L) 12/02/2024    CREATININE 0.94 12/08/2024      Well controlled.   Continue Metformin 1000 BID + Continue Ozempic 0.5 mg weekly.   Recheck A1C in 6 months.   Follow ADA Diet. Avoid soda, simple sweets, and limit rice/pasta/breads/starches (no more than 45-50 grams per meal).  Maintain healthy weight with goal BMI <30.  Exercise 5 times per week for 30 minutes per day.

## 2024-12-11 ENCOUNTER — CLINICAL SUPPORT (OUTPATIENT)
Dept: REHABILITATION | Facility: HOSPITAL | Age: 74
End: 2024-12-11
Payer: MEDICARE

## 2024-12-11 DIAGNOSIS — G89.29 CHRONIC BILATERAL LOW BACK PAIN WITH SCIATICA, SCIATICA LATERALITY UNSPECIFIED: Primary | ICD-10-CM

## 2024-12-11 DIAGNOSIS — M62.81 MUSCLE WEAKNESS: ICD-10-CM

## 2024-12-11 DIAGNOSIS — M54.40 CHRONIC BILATERAL LOW BACK PAIN WITH SCIATICA, SCIATICA LATERALITY UNSPECIFIED: Primary | ICD-10-CM

## 2024-12-11 PROCEDURE — 97110 THERAPEUTIC EXERCISES: CPT | Mod: KX

## 2024-12-11 PROCEDURE — 97530 THERAPEUTIC ACTIVITIES: CPT | Mod: KX

## 2024-12-11 NOTE — PROGRESS NOTES
Physical Therapy Treatment Note     Name: Scooter Donato  Clinic Number: [id number]    Therapy Diagnosis:   Encounter Diagnoses   Name Primary?    Chronic bilateral low back pain with sciatica, sciatica laterality unspecified Yes    Muscle weakness      Physician: Lo Qureshi NP    Visit Date: 12/11/2024    Physician Orders: PT Eval and Treat  Medical Diagnosis from Referral: groin pain and dysfunction  Evaluation Date: 10/15/2024  Authorization Period Expiration: medically necessary  Plan of Care Expiration: 1/15/2025  Visit # / Visits authorized: 8/ medically necessary     Time In: 728  Time Out: 830  Total Appointment Time: 62 minutes    Subjective     Patient reports: he is feeling much better.      CMS Impairment/Limitation/Restriction for FOTO Survey  Therapist reviewed FOTO scores for Scooter Donato on 10/15/2024. FOTO documents entered into EPIC - see Media section.  Patient's Physical FS Primary Measure: 47 (57 on 12/4)  Risk Adjusted Statistical FOTO: 53  Limitation Score: 53%  Category: Mobility  MDQ: 38% disability (22% on 12/4)    Objective     Eval 10/15/2024   Pain:     9/10 low back pain  9-10/10 in left groin      Posture:      Hypertrophy along right paraspinals, elevated right shoulder, lateral shift left at TL junction   Gait Analysis:      Antalgic gait pattern w decreased stance phase left.  Left sacral  lateral lean to left w pelvic height difference, right pelvis elevated.      Palpation     Pain at low back along L5/S1 line at B SIJ.  Decreased movement w PA glides along entire spine.  No pain w PA glides throughout spine.     Point tenderness at left hip flexor origins and along adductor and rectus musculature from hip along medial thigh to knee.      Dermatomes     Numbness at right  anterior thigh, no other radiating symptoms.      Myotomes     Abdominal core - 2/5 (abdominal hernia obvious)        HIP FLX - Right 5/5, Left 4-/5  HIP EXT - Right 3-/5, Left 3-/5     HIP ABD - Right  3-/5, Left 3-/5  HIP ADD - Right 3/5, Left 2/5  KNEE FLX - Right 3-/5, Left 2/5  KNEE EXT - Right 5/5, Left 4/5  ANKLE DF - Right 5/5, Left 4/5  ANKLE PF - Right NT/5, Left NT/5         Hip/SI Clearance     AISHA: pos B  FADDIR: pos B     SACRAL DISTRACTION: inconclusive  SACRAL THIGH THRUST: pos B  SACRAL THRUST: inconclusive B  SACRAL COMPRESSION: pos B      AROM lumbar     Flexion: 0  Extension: 10     Pain with extension      Flexibility     Rectus: 80 R, 80 L  Cesar Test: max restr L  90/90 Hamstrin L, 75 R  Piriformis Test: mod restr R, mod/max restr L      Special Tests        SLR: neg B         Functional Testing     5x STS = 20 sec  2 MWT = 300' without AD     Sit to stand = mod difficulty w UE assist  Bed mobility = max difficulty  Transitional movements = mod difficulty     Balance     Tandem R forward = NT  Tandem L forward = NT     Bereket received the following treatment:     Time Activities   Manual     TherAct 23 min Standing functional step ups and NuStep   TherEx 19 min Core and hip girdle muscle balance restoration   Gait     Neuro Re-ed     Modalities 20 min MH prior, ice at end of session   E-Stim     Dry Needling     Canalith Repositioning       Charges based on one on one time.    Home Exercises Provided and Patient Education Provided     Education provided:   -Plan of care, HEP, walking    Assessment     Exercise focus on prone positioning w hip and HS recruitment, quad stretch with good response.  Supine core recruitment and hip girdle strengthening tolerated.  VC for correct core use.  Hip flexibility improving.  Advanced standing step ups and proprioceptive activity again today.      Able to tolerate full exercise program and functional standing activity with decreased reports of pain.  NuStep x 10 minutes.    Increased ease with functional mobility noted. Hip abduction improving.      Modalities and exercise at shoulders with reports of relief at end of session.    Will continue to  progress functional activity tolerance as able. Discharge discussion initiated. Patient in agreement with plan.    Patient will continue to benefit from therapy for progression of AROM at hips and shoulders, increased LE strengthening, improved balance and increased activity tolerance.  He is hoping to return to work next month.    Patient prognosis is Good.      Anticipated barriers to physical therapy: work scheduling conflicts    Goals: Bereket Is progressing well towards his goals.  Short Term Goals: 6 weeks   Patient will report at least 10% disability reduction from initial MDQ score to indicate clinically significant functional improvement  Patient will report at least 10 point increase on initial FOTO score to indicate clinically significant functional improvement  Patient will report 50% pain reduction     Long Term Goals: 12 weeks   Patient will report at least 20% disability reduction from initial MDQ score to indicate clinically significant functional improvement  Patient will report at least 20 point increase on initial FOTO score to indicate clinically significant functional improvement  Patient will report 50% overall perceived improvement  Patient will demonstrate independence and compliance with HEP  Plan     2-3x/week x 12 weeks    Nelly Smith, PT

## 2024-12-13 ENCOUNTER — CLINICAL SUPPORT (OUTPATIENT)
Dept: REHABILITATION | Facility: HOSPITAL | Age: 74
End: 2024-12-13
Payer: MEDICARE

## 2024-12-13 DIAGNOSIS — G89.29 CHRONIC BILATERAL LOW BACK PAIN WITH SCIATICA, SCIATICA LATERALITY UNSPECIFIED: Primary | ICD-10-CM

## 2024-12-13 DIAGNOSIS — M62.81 MUSCLE WEAKNESS: ICD-10-CM

## 2024-12-13 DIAGNOSIS — M54.40 CHRONIC BILATERAL LOW BACK PAIN WITH SCIATICA, SCIATICA LATERALITY UNSPECIFIED: Primary | ICD-10-CM

## 2024-12-13 PROCEDURE — 97530 THERAPEUTIC ACTIVITIES: CPT | Mod: KX

## 2024-12-13 PROCEDURE — 97110 THERAPEUTIC EXERCISES: CPT | Mod: KX

## 2024-12-13 NOTE — PROGRESS NOTES
Physical Therapy Treatment Note     Name: Scooter Donato  Clinic Number: [id number]    Therapy Diagnosis:   No diagnosis found.    Physician: Lo Qureshi NP    Visit Date: 12/16/2024    Physician Orders: PT Eval and Treat  Medical Diagnosis from Referral: groin pain and dysfunction  Evaluation Date: 10/15/2024  Authorization Period Expiration: medically necessary  Plan of Care Expiration: 1/15/2025  Visit # / Visits authorized: 10/ medically necessary     Time In: 728  Time Out: 828  Total Appointment Time: 60 minutes    Subjective     Patient reports: he is feeling much better.  No new complaints.    CMS Impairment/Limitation/Restriction for FOTO Survey  Therapist reviewed FOTO scores for Scooter Donato on 10/15/2024. FOTO documents entered into EPIC - see Media section.  Patient's Physical FS Primary Measure: 47 (57 on 12/4)  Risk Adjusted Statistical FOTO: 53  Limitation Score: 53%  Category: Mobility  MDQ: 38% disability (22% on 12/4)    Objective     Eval 10/15/2024   Pain:     9/10 low back pain  9-10/10 in left groin      Posture:      Hypertrophy along right paraspinals, elevated right shoulder, lateral shift left at TL junction   Gait Analysis:      Antalgic gait pattern w decreased stance phase left.  Left sacral  lateral lean to left w pelvic height difference, right pelvis elevated.      Palpation     Pain at low back along L5/S1 line at B SIJ.  Decreased movement w PA glides along entire spine.  No pain w PA glides throughout spine.     Point tenderness at left hip flexor origins and along adductor and rectus musculature from hip along medial thigh to knee.      Dermatomes     Numbness at right  anterior thigh, no other radiating symptoms.      Myotomes     Abdominal core - 2/5 (abdominal hernia obvious)        HIP FLX - Right 5/5, Left 4-/5  HIP EXT - Right 3-/5, Left 3-/5     HIP ABD - Right 3-/5, Left 3-/5  HIP ADD - Right 3/5, Left 2/5  KNEE FLX - Right 3-/5, Left 2/5  KNEE EXT - Right 5/5,  Left 4/5  ANKLE DF - Right 5/5, Left 4/5  ANKLE PF - Right NT/5, Left NT/5         Hip/SI Clearance     AISHA: pos B  FADDIR: pos B     SACRAL DISTRACTION: inconclusive  SACRAL THIGH THRUST: pos B  SACRAL THRUST: inconclusive B  SACRAL COMPRESSION: pos B      AROM lumbar     Flexion: 0  Extension: 10     Pain with extension      Flexibility     Rectus: 80 R, 80 L  Cesar Test: max restr L  90/90 Hamstrin L, 75 R  Piriformis Test: mod restr R, mod/max restr L      Special Tests        SLR: neg B         Functional Testing     5x STS = 20 sec  2 MWT = 300' without AD     Sit to stand = mod difficulty w UE assist  Bed mobility = max difficulty  Transitional movements = mod difficulty     Balance     Tandem R forward = NT  Tandem L forward = NT     Bereket received the following treatment:     Time Activities   Manual     TherAct 24 min Standing functional step ups and NuStep   TherEx 26 min Core and hip girdle muscle balance restoration   Gait     Neuro Re-ed     Modalities 10 min ice at end of session   E-Stim     Dry Needling     Canalith Repositioning       Charges based on one on one time.    Home Exercises Provided and Patient Education Provided     Education provided:   -Plan of care, HEP, walking    Assessment     Exercise focus on prone positioning w hip and HS recruitment, quad stretch with good response.  Supine core recruitment and hip girdle strengthening tolerated.  VC for correct core use. Advanced to 1/2 roll for increased core recruitment with good tolerance. Hip flexibility improving.  Advanced standing step ups and proprioceptive activity again today.      Able to tolerate full exercise program and functional standing activity with decreased reports of pain.  NuStep x 10 minutes.    Increased ease with functional mobility noted. Hip abduction improving.      Modalities and exercise at shoulders with reports of relief at end of session.    Will continue to progress functional activity tolerance as  able. Discharge discussion initiated. Patient in agreement with plan.    Patient will continue to benefit from therapy for progression of AROM at hips and shoulders, increased LE strengthening, improved balance and increased activity tolerance.  He is hoping to return to work next month.    Patient prognosis is Good.      Anticipated barriers to physical therapy: work scheduling conflicts    Goals: Bereket Is progressing well towards his goals.  Short Term Goals: 6 weeks   Patient will report at least 10% disability reduction from initial MDQ score to indicate clinically significant functional improvement  Patient will report at least 10 point increase on initial FOTO score to indicate clinically significant functional improvement  Patient will report 50% pain reduction     Long Term Goals: 12 weeks   Patient will report at least 20% disability reduction from initial MDQ score to indicate clinically significant functional improvement  Patient will report at least 20 point increase on initial FOTO score to indicate clinically significant functional improvement  Patient will report 50% overall perceived improvement  Patient will demonstrate independence and compliance with HEP  Plan     2-3x/week x 12 weeks    Nelly Smith, PT

## 2024-12-13 NOTE — PROGRESS NOTES
Physical Therapy Treatment Note     Name: Scooter Donato  Clinic Number: [id number]    Therapy Diagnosis:   Encounter Diagnoses   Name Primary?    Chronic bilateral low back pain with sciatica, sciatica laterality unspecified Yes    Muscle weakness      Physician: Lo Qureshi NP    Visit Date: 12/13/2024    Physician Orders: PT Eval and Treat  Medical Diagnosis from Referral: groin pain and dysfunction  Evaluation Date: 10/15/2024  Authorization Period Expiration: medically necessary  Plan of Care Expiration: 1/15/2025  Visit # / Visits authorized: 9/ medically necessary     Time In: 728  Time Out: 821  Total Appointment Time: 53 minutes    Subjective     Patient reports: he is feeling much better.  No new complaints.    CMS Impairment/Limitation/Restriction for FOTO Survey  Therapist reviewed FOTO scores for Scooter Donato on 10/15/2024. FOTO documents entered into [employer] - see Media section.  Patient's Physical FS Primary Measure: 47 (57 on 12/4)  Risk Adjusted Statistical FOTO: 53  Limitation Score: 53%  Category: Mobility  MDQ: 38% disability (22% on 12/4)    Objective     Eval 10/15/2024   Pain:     9/10 low back pain  9-10/10 in left groin      Posture:      Hypertrophy along right paraspinals, elevated right shoulder, lateral shift left at TL junction   Gait Analysis:      Antalgic gait pattern w decreased stance phase left.  Left sacral  lateral lean to left w pelvic height difference, right pelvis elevated.      Palpation     Pain at low back along L5/S1 line at B SIJ.  Decreased movement w PA glides along entire spine.  No pain w PA glides throughout spine.     Point tenderness at left hip flexor origins and along adductor and rectus musculature from hip along medial thigh to knee.      Dermatomes     Numbness at right  anterior thigh, no other radiating symptoms.      Myotomes     Abdominal core - 2/5 (abdominal hernia obvious)        HIP FLX - Right 5/5, Left 4-/5  HIP EXT - Right 3-/5, Left 3-/5      HIP ABD - Right 3-/5, Left 3-/5  HIP ADD - Right 3/5, Left 2/5  KNEE FLX - Right 3-/5, Left 2/5  KNEE EXT - Right 5/5, Left 4/5  ANKLE DF - Right 5/5, Left 4/5  ANKLE PF - Right NT/5, Left NT/5         Hip/SI Clearance     AISHA: pos B  FADDIR: pos B     SACRAL DISTRACTION: inconclusive  SACRAL THIGH THRUST: pos B  SACRAL THRUST: inconclusive B  SACRAL COMPRESSION: pos B      AROM lumbar     Flexion: 0  Extension: 10     Pain with extension      Flexibility     Rectus: 80 R, 80 L  Cesar Test: max restr L  90/90 Hamstrin L, 75 R  Piriformis Test: mod restr R, mod/max restr L      Special Tests        SLR: neg B         Functional Testing     5x STS = 20 sec  2 MWT = 300' without AD     Sit to stand = mod difficulty w UE assist  Bed mobility = max difficulty  Transitional movements = mod difficulty     Balance     Tandem R forward = NT  Tandem L forward = NT     Bereket received the following treatment:     Time Activities   Manual     TherAct 24 min Standing functional step ups and NuStep   TherEx 19 min Core and hip girdle muscle balance restoration   Gait     Neuro Re-ed     Modalities 10 min ice at end of session   E-Stim     Dry Needling     Canalith Repositioning       Charges based on one on one time.    Home Exercises Provided and Patient Education Provided     Education provided:   -Plan of care, HEP, walking    Assessment     Exercise focus on prone positioning w hip and HS recruitment, quad stretch with good response.  Supine core recruitment and hip girdle strengthening tolerated.  VC for correct core use. Advanced to 1/2 roll for increased core recruitment with good tolerance. Hip flexibility improving.  Advanced standing step ups and proprioceptive activity again today.      Able to tolerate full exercise program and functional standing activity with decreased reports of pain.  NuStep x 10 minutes.    Increased ease with functional mobility noted. Hip abduction improving.      Modalities and  exercise at shoulders with reports of relief at end of session.    Will continue to progress functional activity tolerance as able. Discharge discussion initiated. Patient in agreement with plan.    Patient will continue to benefit from therapy for progression of AROM at hips and shoulders, increased LE strengthening, improved balance and increased activity tolerance.  He is hoping to return to work next month.    Patient prognosis is Good.      Anticipated barriers to physical therapy: work scheduling conflicts    Goals: Bereket Is progressing well towards his goals.  Short Term Goals: 6 weeks   Patient will report at least 10% disability reduction from initial MDQ score to indicate clinically significant functional improvement  Patient will report at least 10 point increase on initial FOTO score to indicate clinically significant functional improvement  Patient will report 50% pain reduction     Long Term Goals: 12 weeks   Patient will report at least 20% disability reduction from initial MDQ score to indicate clinically significant functional improvement  Patient will report at least 20 point increase on initial FOTO score to indicate clinically significant functional improvement  Patient will report 50% overall perceived improvement  Patient will demonstrate independence and compliance with HEP  Plan     2-3x/week x 12 weeks    Nelly Smith, PT

## 2024-12-16 ENCOUNTER — CLINICAL SUPPORT (OUTPATIENT)
Dept: REHABILITATION | Facility: HOSPITAL | Age: 74
End: 2024-12-16
Payer: MEDICARE

## 2024-12-16 DIAGNOSIS — M54.40 CHRONIC BILATERAL LOW BACK PAIN WITH SCIATICA, SCIATICA LATERALITY UNSPECIFIED: Primary | ICD-10-CM

## 2024-12-16 DIAGNOSIS — G89.29 CHRONIC BILATERAL LOW BACK PAIN WITH SCIATICA, SCIATICA LATERALITY UNSPECIFIED: Primary | ICD-10-CM

## 2024-12-16 PROCEDURE — 97110 THERAPEUTIC EXERCISES: CPT | Mod: KX

## 2024-12-16 PROCEDURE — 97530 THERAPEUTIC ACTIVITIES: CPT | Mod: KX

## 2024-12-19 ENCOUNTER — OFFICE VISIT (OUTPATIENT)
Dept: FAMILY MEDICINE | Facility: CLINIC | Age: 74
End: 2024-12-19
Payer: MEDICARE

## 2024-12-19 VITALS
TEMPERATURE: 98 F | HEART RATE: 74 BPM | RESPIRATION RATE: 20 BRPM | SYSTOLIC BLOOD PRESSURE: 121 MMHG | HEIGHT: 65 IN | OXYGEN SATURATION: 96 % | DIASTOLIC BLOOD PRESSURE: 73 MMHG | BODY MASS INDEX: 28.96 KG/M2 | WEIGHT: 173.81 LBS

## 2024-12-19 DIAGNOSIS — J98.8 CONGESTION OF RESPIRATORY TRACT: Primary | ICD-10-CM

## 2024-12-19 DIAGNOSIS — E11.65 TYPE 2 DIABETES MELLITUS WITH HYPERGLYCEMIA, WITHOUT LONG-TERM CURRENT USE OF INSULIN: Chronic | ICD-10-CM

## 2024-12-19 DIAGNOSIS — R05.1 ACUTE COUGH: ICD-10-CM

## 2024-12-19 PROCEDURE — 99214 OFFICE O/P EST MOD 30 MIN: CPT | Mod: ,,,

## 2024-12-19 RX ORDER — GUAIFENESIN 600 MG/1
1200 TABLET, EXTENDED RELEASE ORAL 2 TIMES DAILY
Qty: 20 TABLET | Refills: 0 | Status: SHIPPED | OUTPATIENT
Start: 2024-12-19 | End: 2024-12-24

## 2024-12-19 RX ORDER — FLUTICASONE PROPIONATE 50 MCG
1 SPRAY, SUSPENSION (ML) NASAL 2 TIMES DAILY
Qty: 16 ML | Refills: 11 | Status: SHIPPED | OUTPATIENT
Start: 2024-12-19 | End: 2025-12-19

## 2024-12-19 RX ORDER — BENZONATATE 200 MG/1
200 CAPSULE ORAL 3 TIMES DAILY PRN
Qty: 30 CAPSULE | Refills: 0 | Status: SHIPPED | OUTPATIENT
Start: 2024-12-19 | End: 2024-12-29

## 2024-12-19 NOTE — PROGRESS NOTES
Patient ID: [id number]     Chief Complaint: head congestion (Sore throat ) and Cough    HPI:     Scooter Donato is a 74 y.o. male here today for complaints of head congestion and cough that started three days ago. He reports taking Laly [place] plus over the counter for symptoms relief. Denies any fevers.      Past Medical History:   Diagnosis Date    Allergic rhinitis     Anxiety disorder, unspecified     Asthma     Asthmatic bronchitis     Atherosclerosis of aorta     Chronic inflammatory arthritis     Claudication     Diabetic neuropathy     Dysphagia     Family history of aortic aneurysm     GERD (gastroesophageal reflux disease)     Heart murmur     HLD (hyperlipidemia)     HTN (hypertension)     Hypercalcemia     Hyperparathyroidism     Hypoparathyroidism, unspecified     Lower extremity edema     Male erectile dysfunction, unspecified     Multiple actinic keratoses     Nonrheumatic mitral (valve) insufficiency     Osteoarthritis     Personal history of colonic polyps 2017    Tubular adenoma 2017    Type 2 diabetes mellitus     Vitamin D deficiency         Past Surgical History:   Procedure Laterality Date    APPENDECTOMY      COLONOSCOPY  12/05/2022    Dr Himanshu Prajapati - [place], [place]    CYSTOSCOPY  2012    TONSILLECTOMY          Social History     Socioeconomic History    Marital status:    Tobacco Use    Smoking status: Never    Smokeless tobacco: Never   Substance and Sexual Activity    Alcohol use: Yes    Drug use: Never    Sexual activity: Not Currently     Partners: Female     Social Drivers of Health     Financial Resource Strain: Medium Risk (8/26/2024)    Overall Financial Resource Strain (CARDIA)     Difficulty of Paying Living Expenses: Somewhat hard   Food Insecurity: No Food Insecurity (8/7/2024)    Hunger Vital Sign     Worried About Running Out of Food in the Last Year: Never true     Ran Out of Food in the Last Year: Never true   Transportation Needs: No Transportation Needs (8/7/2024)     TRANSPORTATION NEEDS     Transportation : No   Physical Activity: Sufficiently Active (5/31/2023)    Exercise Vital Sign     Days of Exercise per Week: 6 days     Minutes of Exercise per Session: 30 min   Stress: No Stress Concern Present (5/31/2023)    [employer] - Occupational Stress Questionnaire     Feeling of Stress : Only a little   Housing Stability: Low Risk  (8/7/2024)    Housing Stability Vital Sign     Unable to Pay for Housing in the Last Year: No     Homeless in the Last Year: No        Current Outpatient Medications   Medication Instructions    albuterol (PROVENTIL/VENTOLIN HFA) 90 mcg/actuation inhaler INHALE 2 PUFFS BY MOUTH EVERY 4 - 6 HOURS AS NEEDED FOR WHEEZING    atorvastatin (LIPITOR) 10 mg, Oral, Nightly    benzonatate (TESSALON) 200 mg, Oral, 3 times daily PRN    blood sugar diagnostic Strp 1 each, Misc.(Non-Drug; Combo Route), Daily    blood-glucose meter kit Use as instructed    blood-glucose sensor (FREESTYLE MADDIE 3 SENSOR) Crystal 1 each, Misc.(Non-Drug; Combo Route), Every 14 days    BREO ELLIPTA 100-25 mcg/dose diskus inhaler 1 puff, Inhalation    ezetimibe (ZETIA) 10 mg, Oral, Nightly    flash glucose sensor (FREESTYLE MADDIE 14 DAY SENSOR) Kit 1 each, Misc.(Non-Drug; Combo Route), Every 14 days    fluticasone propionate (FLONASE) 50 mcg, Each Nostril, 2 times daily    guaiFENesin (MUCINEX) 1,200 mg, Oral, 2 times daily    lancets (LANCETS,THIN) Misc 1 each, Misc.(Non-Drug; Combo Route), Daily    metFORMIN (GLUCOPHAGE-XR) 1,000 mg, Oral, 2 times daily with meals    omeprazole (PRILOSEC) 20 mg, Oral, Every morning    OZEMPIC 0.5 mg, Subcutaneous, Every 7 days    valsartan (DIOVAN) 320 mg, Oral, Daily       Review of patient's allergies indicates:  No Known Allergies     Patient Care Team:  John Ford DO as PCP - General (Family Medicine)  [facility] -  Ulysses Hernandez MD as Consulting Provider (Rheumatology)  Lizz Britt as  "Consulting Provider (Internal Medicine)  Bam Sagastume MD as Consulting Provider (Dermatology)  Letha De Anda MD as Consulting Provider (Cardiology)  [facility] (Oncology)  Max Gonzalez MD as Resident (Cardiology)  [facility] (Optometry)     Subjective:     Review of Systems    12 point review of systems conducted, negative except as stated in the history of present illness. See HPI for details.    Objective:     Visit Vitals  /73 (BP Location: Right arm, Patient Position: Sitting)   Pulse 74   Temp 97.8 °F (36.6 °C)   Resp 20   Ht 5' 5" (1.651 m)   Wt 78.8 kg (173 lb 12.8 oz)   SpO2 96%   BMI 28.92 kg/m²       Physical Exam  Vitals and nursing note reviewed.   Constitutional:       General: He is not in acute distress.     Appearance: He is not ill-appearing.   HENT:      Head: Normocephalic and atraumatic.      Right Ear: Hearing and tympanic membrane normal. No swelling or tenderness. There is impacted cerumen. Tympanic membrane is not bulging.      Left Ear: Hearing and tympanic membrane normal. No swelling or tenderness. There is impacted cerumen. Tympanic membrane is not bulging.      Ears:      Comments: Cerumen removed by curette.      Mouth/Throat:      Mouth: Mucous membranes are moist.      Pharynx: Oropharynx is clear.   Eyes:      General: No scleral icterus.     Extraocular Movements: Extraocular movements intact.      Conjunctiva/sclera: Conjunctivae normal.      Pupils: Pupils are equal, round, and reactive to light.   Neck:      Vascular: No carotid bruit.   Cardiovascular:      Rate and Rhythm: Normal rate and regular rhythm.      Heart sounds: No murmur heard.     No friction rub. No gallop.   Pulmonary:      Effort: Pulmonary effort is normal. No respiratory distress.      Breath sounds: Normal breath sounds. No decreased breath sounds, wheezing, rhonchi or rales.   Abdominal:      General: Abdomen is flat. Bowel sounds are normal. There is no " distension.      Palpations: Abdomen is soft. There is no mass.      Tenderness: There is no abdominal tenderness.   Musculoskeletal:         General: Normal range of motion.      Cervical back: Normal range of motion and neck supple.   Skin:     General: Skin is warm and dry.   Neurological:      General: No focal deficit present.      Mental Status: He is alert.   Psychiatric:         Mood and Affect: Mood normal.       Labs Reviewed:     Chemistry:  Lab Results   Component Value Date     12/08/2024    K 3.9 12/08/2024    BUN 25.0 12/08/2024    CREATININE 0.94 12/08/2024    EGFRNORACEVR >60 12/08/2024    GLUCOSE 100 12/08/2024    CALCIUM 9.5 12/08/2024    ALKPHOS 68 12/08/2024    LABPROT 7.1 12/08/2024    ALBUMIN 3.9 12/08/2024    AST 16 12/08/2024    ALT 21 12/08/2024    PHOS 3.3 08/16/2022    JWCQGSBZ04UY 54.9 11/30/2023    TSH 1.252 11/30/2023    MZRWMA4UEJY 1.06 08/16/2022    PSA 2.66 12/02/2024        Lab Results   Component Value Date    HGBA1C 6.0 12/02/2024        Hematology:  Lab Results   Component Value Date    WBC 6.38 12/08/2024    HGB 13.4 (L) 12/08/2024    HCT 40.9 (L) 12/08/2024     12/08/2024       Lipid Panel:  Lab Results   Component Value Date    CHOL 87 12/02/2024    HDL 36 12/02/2024    LDL 39.00 (L) 12/02/2024    TRIG 62 12/02/2024    TOTALCHOLEST 2 12/02/2024        Urine:  Lab Results   Component Value Date    CREATRANDUR 60.6 (L) 03/07/2024      Assessment:       ICD-10-CM ICD-9-CM   1. Congestion of respiratory tract  J98.8 519.8   2. Acute cough  R05.1 786.2   3. Type 2 diabetes mellitus with hyperglycemia, without long-term current use of insulin  E11.65 250.00     790.29     Plan:     1. Congestion of respiratory tract  -     guaiFENesin (MUCINEX) 600 mg 12 hr tablet; Take 2 tablets (1,200 mg total) by mouth 2 (two) times daily. for 5 days  Dispense: 20 tablet; Refill: 0  -     fluticasone propionate (FLONASE) 50 mcg/actuation nasal spray; 1 spray (50 mcg total) by Each  Nostril route 2 (two) times a day.  Dispense: 16 mL; Refill: 11    Start Mucinex 1200 mg BID x 5 days + Flonase BID + Tessalon 200 mg TID PRN cough.   Wash hands frequently to prevent common colds.  Drink plenty of fluids to thin mucous and/or use humidifier.    Consider NeilMed Saline Sinus Rinse BID.  Apply warm compress for sinus pain.    2. Acute cough  -     benzonatate (TESSALON) 200 MG capsule; Take 1 capsule (200 mg total) by mouth 3 (three) times daily as needed for Cough.  Dispense: 30 capsule; Refill: 0    3. Type 2 diabetes mellitus with hyperglycemia, without long-term current use of insulin  Avoid over the counter cough medications as the sugar in the medications can raise blood sugar levels.   If needed, opt for sugar-free cough syrups.   Contact PCP if symptoms fail to improve.     Follow up if symptoms worsen or fail to improve. In addition to their scheduled follow up, the patient has also been instructed to follow up on as needed basis.     Future Appointments   Date Time Provider Department Center   12/23/2024  7:30 AM Nelly Smith, PT [facility]   6/16/2025  8:20 AM John Ford, DO [facility]    12/12/2025  8:00 AM Lo Qureshi NP [facility]        Lo Qureshi NP

## 2024-12-26 ENCOUNTER — TELEPHONE (OUTPATIENT)
Dept: FAMILY MEDICINE | Facility: CLINIC | Age: 74
End: 2024-12-26
Payer: MEDICARE

## 2024-12-26 DIAGNOSIS — R05.1 ACUTE COUGH: Primary | ICD-10-CM

## 2024-12-26 DIAGNOSIS — I10 PRIMARY HYPERTENSION: ICD-10-CM

## 2024-12-26 RX ORDER — VALSARTAN 320 MG/1
320 TABLET ORAL DAILY
Qty: 90 TABLET | Refills: 1 | Status: SHIPPED | OUTPATIENT
Start: 2024-12-26 | End: 2025-06-24

## 2024-12-26 NOTE — TELEPHONE ENCOUNTER
Informed patient I have not received anything regarding a freestyle gerard from a company in [place]

## 2024-12-26 NOTE — TELEPHONE ENCOUNTER
----- Message from Nella sent at 12/26/2024  2:13 PM CST -----  Regarding: DM meter  Have you gotten any info about the free style gerard    [phone]

## 2024-12-27 ENCOUNTER — CLINICAL SUPPORT (OUTPATIENT)
Dept: REHABILITATION | Facility: HOSPITAL | Age: 74
End: 2024-12-27
Payer: MEDICARE

## 2024-12-27 DIAGNOSIS — M54.40 CHRONIC BILATERAL LOW BACK PAIN WITH SCIATICA, SCIATICA LATERALITY UNSPECIFIED: Primary | ICD-10-CM

## 2024-12-27 DIAGNOSIS — G89.29 CHRONIC BILATERAL LOW BACK PAIN WITH SCIATICA, SCIATICA LATERALITY UNSPECIFIED: Primary | ICD-10-CM

## 2024-12-27 DIAGNOSIS — M62.81 MUSCLE WEAKNESS: ICD-10-CM

## 2024-12-27 PROCEDURE — 97530 THERAPEUTIC ACTIVITIES: CPT | Mod: KX

## 2024-12-27 PROCEDURE — 97110 THERAPEUTIC EXERCISES: CPT | Mod: KX

## 2024-12-27 NOTE — PROGRESS NOTES
Physical Therapy Treatment Note     Name: Scooter Donato  Clinic Number: [id number]    Therapy Diagnosis:   Encounter Diagnoses   Name Primary?    Chronic bilateral low back pain with sciatica, sciatica laterality unspecified Yes    Muscle weakness        Physician: Lo Qureshi NP    Visit Date: 12/27/2024    Physician Orders: PT Eval and Treat  Medical Diagnosis from Referral: groin pain and dysfunction  Evaluation Date: 10/15/2024  Authorization Period Expiration: medically necessary  Plan of Care Expiration: 1/15/2025  Visit # / Visits authorized: 11/ medically necessary     Time In: 728  Time Out: 858  Total Appointment Time: 90 minutes    Subjective     Patient reports: his back is feeling much better and his legs are much stronger. He says his back and left leg are 100% better. 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    But he says since taking the Ozempic shot, he has developed and continues to have persistent shoulder pain. He is requesting X-rays of his shoulders.  He is still planning to discharge from therapy next Monday and return to work next week.    CMS Impairment/Limitation/Restriction for FOTO Survey  Therapist reviewed FOTO scores for Scooter Donato on 10/15/2024. FOTO documents entered into [employer] - see Media section.  Patient's Physical FS Primary Measure: 47 (57 on 12/4)  Risk Adjusted Statistical FOTO: 53  Limitation Score: 53%  Category: Mobility  MDQ: 38% disability (22% on 12/4)    Objective     Eval 10/15/2024   Pain:     9/10 low back pain  9-10/10 in left groin      Posture:      Hypertrophy along right paraspinals, elevated right shoulder, lateral shift left at TL junction   Gait Analysis:      Antalgic gait pattern w decreased stance phase left.  Left sacral  lateral lean to left w pelvic height difference, right pelvis elevated.      Palpation     Pain at low back along L5/S1 line at B SIJ.  Decreased movement w PA glides along entire spine.  No pain w PA glides  throughout spine.     Point tenderness at left hip flexor origins and along adductor and rectus musculature from hip along medial thigh to knee.      Dermatomes     Numbness at right  anterior thigh, no other radiating symptoms.      Myotomes     Abdominal core - 2/5 (abdominal hernia obvious)        HIP FLX - Right 5/5, Left 4-/5  HIP EXT - Right 3-/5, Left 3-/5     HIP ABD - Right 3-/5, Left 3-/5  HIP ADD - Right 3/5, Left 2/5  KNEE FLX - Right 3-/5, Left 2/5  KNEE EXT - Right 5/5, Left 4/5  ANKLE DF - Right 5/5, Left 4/5  ANKLE PF - Right NT/5, Left NT/5         Hip/SI Clearance     AISHA: pos B  FADDIR: pos B     SACRAL DISTRACTION: inconclusive  SACRAL THIGH THRUST: pos B  SACRAL THRUST: inconclusive B  SACRAL COMPRESSION: pos B      AROM lumbar     Flexion: 0  Extension: 10     Pain with extension      Flexibility     Rectus: 80 R, 80 L  Cesar Test: max restr L  90/90 Hamstrin L, 75 R  Piriformis Test: mod restr R, mod/max restr L      Special Tests        SLR: neg B         Functional Testing     5x STS = 20 sec  2 MWT = 300' without AD     Sit to stand = mod difficulty w UE assist  Bed mobility = max difficulty  Transitional movements = mod difficulty     Balance     Tandem R forward = NT  Tandem L forward = NT     Bereket received the following treatment:     Time Activities   Manual     TherAct 34 min Standing functional step ups and NuStep   TherEx 46 min Core and hip girdle muscle balance restoration   Gait     Neuro Re-ed     Modalities 10 min ice at end of session   E-Stim     Dry Needling     Canalith Repositioning       Charges based on one on one time.    Home Exercises Provided and Patient Education Provided     Education provided:   -Plan of care, HEP, walking    Assessment     Exercise focus on prone positioning w hip and HS recruitment, quad stretch with good response.  Supine core recruitment and hip girdle strengthening tolerated.  VC for correct core use. Advanced to / roll for  increased core recruitment with good tolerance. Hip flexibility improving.  Advanced standing step ups and proprioceptive activity again today.      Able to tolerate full exercise program and functional standing activity with decreased reports of pain.  NuStep x 10 minutes.    Increased ease with functional mobility noted. Hip abduction improving.      Modalities and exercise at shoulders with reports of relief at end of session. Observation reveals RC weakness and abnormal movement patterns at left shoulder.  Messaged Dr Lopez regarding X-ray request from patient.    Will continue to progress functional activity tolerance as able. Discharge discussion continued. Patient in agreement with plan.    Patient will continue to benefit from therapy for progression of AROM at hips and shoulders, increased LE strengthening, improved balance and increased activity tolerance.  He is hoping to return to work next week.    Patient prognosis is Good.      Anticipated barriers to physical therapy: work scheduling conflicts    Goals: Bereket Is progressing well towards his goals.  Short Term Goals: 6 weeks   Patient will report at least 10% disability reduction from initial MDQ score to indicate clinically significant functional improvement  Patient will report at least 10 point increase on initial FOTO score to indicate clinically significant functional improvement  Patient will report 50% pain reduction     Long Term Goals: 12 weeks   Patient will report at least 20% disability reduction from initial MDQ score to indicate clinically significant functional improvement  Patient will report at least 20 point increase on initial FOTO score to indicate clinically significant functional improvement  Patient will report 50% overall perceived improvement  Patient will demonstrate independence and compliance with HEP  Plan     2-3x/week x 12 weeks    Nelly Smith, PT

## 2024-12-30 ENCOUNTER — CLINICAL SUPPORT (OUTPATIENT)
Dept: REHABILITATION | Facility: HOSPITAL | Age: 74
End: 2024-12-30
Payer: MEDICARE

## 2024-12-30 DIAGNOSIS — G89.29 CHRONIC BILATERAL LOW BACK PAIN WITH SCIATICA, SCIATICA LATERALITY UNSPECIFIED: Primary | ICD-10-CM

## 2024-12-30 DIAGNOSIS — Z29.11 NEED FOR RSV VACCINATION: Primary | ICD-10-CM

## 2024-12-30 DIAGNOSIS — M54.40 CHRONIC BILATERAL LOW BACK PAIN WITH SCIATICA, SCIATICA LATERALITY UNSPECIFIED: Primary | ICD-10-CM

## 2024-12-30 DIAGNOSIS — M62.81 MUSCLE WEAKNESS: ICD-10-CM

## 2024-12-30 PROCEDURE — 97530 THERAPEUTIC ACTIVITIES: CPT | Mod: KX

## 2024-12-30 PROCEDURE — 97110 THERAPEUTIC EXERCISES: CPT | Mod: KX

## 2024-12-30 NOTE — PROGRESS NOTES
Physical Therapy Discharge Note     Name: Scooter Donato  Clinic Number: [id number]    Therapy Diagnosis:   Encounter Diagnoses   Name Primary?    Chronic bilateral low back pain with sciatica, sciatica laterality unspecified Yes    Muscle weakness        Physician: Lo Qureshi NP    Visit Date: 12/30/2024    Physician Orders: PT Eval and Treat  Medical Diagnosis from Referral: groin pain and dysfunction  Evaluation Date: 10/15/2024  Authorization Period Expiration: medically necessary  Plan of Care Expiration: 1/15/2025  Visit # / Visits authorized: 12/ medically necessary     Time In: 730  Time Out: 858  Total Appointment Time: 88 minutes    Subjective     Patient reports: he was able to ride a horse for 2 hours this weekend.  He was sore right after, but felt better 15 minutes after.  He is ready for discharge today, but still asking about orders for imaging of his shoulders.    Message on [employer] from Dr Ford this morning regarding shoulder imaging.      CMS Impairment/Limitation/Restriction for FOTO Survey  Therapist reviewed FOTO scores for Scooter Donato on 10/15/2024. FOTO documents entered into [employer] - see Media section.  Patient's Physical FS Primary Measure: 47 (57 on 12/4) (67 on 12/30)  Risk Adjusted Statistical FOTO: 53  Limitation Score: 53%  Category: Mobility  MDQ: 38% disability (22% on 12/4) (10% on 12/30)    Objective     Eval 10/15/2024 Discharge 12/30/2024   Pain:     9/10 low back pain  9-10/10 in left groin    Pain:    0/10 at low back  0/10 in groin   Posture:      Hypertrophy along right paraspinals, elevated right shoulder, lateral shift left at TL junction    Gait Analysis:      Antalgic gait pattern w decreased stance phase left.  Left sacral  lateral lean to left w pelvic height difference, right pelvis elevated.       Palpation     Pain at low back along L5/S1 line at B SIJ.  Decreased movement w PA glides along entire spine.  No pain w PA glides throughout spine.     Point  tenderness at left hip flexor origins and along adductor and rectus musculature from hip along medial thigh to knee.       Dermatomes     Numbness at right  anterior thigh, no other radiating symptoms.       Myotomes     Abdominal core - 25 (abdominal hernia obvious)        HIP FLX - Right 5/5, Left 4-/5  HIP EXT - Right 3-/5, Left 3-/5     HIP ABD - Right 3-/5, Left 3-/5  HIP ADD - Right 3/5, Left 2/5  KNEE FLX - Right 3-5, Left 2/5  KNEE EXT - Right 5/5, Left 4/5  ANKLE DF - Right 5/5, Left 4/5  ANKLE PF - Right NT/5, Left NT/5       Myotomes     Abdominal core -  (abdominal hernia obvious)        HIP FLX - Right 55, Left 4+/5  HIP EXT - Right , Left 4/5     HIP ABD - Right , Left 5/5  HIP ADD - Right , Left 4/5  KNEE FLX - Right , Left 4/5  KNEE EXT - Right , Left 5/5  ANKLE DF - Right , Left 5/5  ANKLE PF - Right , Left 5/5   Hip/SI Clearance     AISHA: pos B  FADDIR: pos B     SACRAL DISTRACTION: inconclusive  SACRAL THIGH THRUST: pos B  SACRAL THRUST: inconclusive B  SACRAL COMPRESSION: pos B       AROM lumbar     Flexion: 0  Extension: 10     Pain with extension       Flexibility     Rectus: 80 R, 80 L  Cesar Test: max restr L  90/90 Hamstrin L, 75 R  Piriformis Test: mod restr R, mod/max restr L    Flexibility       Cesar Test: mod restr L  90/90 Hamstrin L, 75 R  Piriformis Test: min restr R, min restr L   Special Tests        SLR: neg B          Functional Testing     5x STS = 20 sec  2 MWT = 300' without AD     Sit to stand = mod difficulty w UE assist  Bed mobility = max difficulty  Transitional movements = mod difficulty     Balance     Tandem R forward = NT  Tandem L forward = NT Functional Testing     5x STS = 9 sec     Sit to stand = no difficulty  Bed mobility = no difficulty  Transitional movements = no difficulty       Balance     Tandem R forward = 20 sec w no sway  Tandem L forward = 20 sec w min sway     Bereket received the following treatment:     Time  Activities   Manual     TherAct 34 min Standing functional step ups and NuStep   TherEx 44 min Core and hip girdle muscle balance restoration   Gait     Neuro Re-ed     Modalities 10 min ice at end of session   E-Stim     Dry Needling     Canalith Repositioning       Charges based on one on one time.    Home Exercises Provided and Patient Education Provided     Education provided:   -Plan of care, HEP, walking    Assessment   Patient has completed 12 visits of therapy with focus on core and hip girdle strengthening with good response.  He is reporting 100% overall perceived improvement since start of therapy and all functional movement is greatly improved.  He has been able to return to horseback riding and is compliant with HEP as established.    He does still complain of B shoulder pain and has been referred back to MD for imaging and care of those complaints.    Exercise focus on prone positioning w hip and HS recruitment, quad stretch with good response.  Supine core recruitment and hip girdle strengthening tolerated.  VC for correct core use. Continued 1/2 roll for increased core recruitment with good tolerance. Hip flexibility improved.  Continued standing step ups and proprioceptive activity again today.      All goals met.    Discharge therapy. Patient in agreement.    Patient prognosis is Good.      Anticipated barriers to physical therapy: work scheduling conflicts    Goals: Bereket Is progressing well towards his goals.  Short Term Goals: 6 weeks   Patient will report at least 10% disability reduction from initial MDQ score to indicate clinically significant functional improvement MET  Patient will report at least 10 point increase on initial FOTO score to indicate clinically significant functional improvement MET  Patient will report 50% pain reduction MET     Long Term Goals: 12 weeks   Patient will report at least 20% disability reduction from initial MDQ score to indicate clinically significant  functional improvement MET  Patient will report at least 20 point increase on initial FOTO score to indicate clinically significant functional improvement MET  Patient will report 50% overall perceived improvement MET  Patient will demonstrate independence and compliance with HEP MET  Plan     DISCHARGE    Nelly Smith, PT

## 2025-01-07 ENCOUNTER — TELEPHONE (OUTPATIENT)
Dept: FAMILY MEDICINE | Facility: CLINIC | Age: 75
End: 2025-01-07
Payer: MEDICARE

## 2025-01-08 NOTE — TELEPHONE ENCOUNTER
I'm going to need a visit to get documentation for the left shoulder pain before insurance will cover imaging. Please confirm that it is the left shoulder. Will try to order X-rays of shoulder prior to appointment which are also required before an MRI will be approved.

## 2025-01-09 ENCOUNTER — HOSPITAL ENCOUNTER (OUTPATIENT)
Dept: RADIOLOGY | Facility: HOSPITAL | Age: 75
Discharge: HOME | End: 2025-01-09
Attending: FAMILY MEDICINE
Payer: MEDICARE

## 2025-01-09 DIAGNOSIS — M25.512 CHRONIC LEFT SHOULDER PAIN: ICD-10-CM

## 2025-01-09 DIAGNOSIS — M25.512 CHRONIC LEFT SHOULDER PAIN: Primary | ICD-10-CM

## 2025-01-09 DIAGNOSIS — G89.29 CHRONIC LEFT SHOULDER PAIN: ICD-10-CM

## 2025-01-09 DIAGNOSIS — G89.29 CHRONIC LEFT SHOULDER PAIN: Primary | ICD-10-CM

## 2025-01-09 PROCEDURE — 73030 X-RAY EXAM OF SHOULDER: CPT | Mod: TC,LT

## 2025-01-10 ENCOUNTER — OFFICE VISIT (OUTPATIENT)
Dept: FAMILY MEDICINE | Facility: CLINIC | Age: 75
End: 2025-01-10
Payer: MEDICARE

## 2025-01-10 VITALS
HEIGHT: 65 IN | SYSTOLIC BLOOD PRESSURE: 134 MMHG | WEIGHT: 173 LBS | DIASTOLIC BLOOD PRESSURE: 76 MMHG | RESPIRATION RATE: 19 BRPM | OXYGEN SATURATION: 98 % | TEMPERATURE: 97 F | BODY MASS INDEX: 28.82 KG/M2 | HEART RATE: 83 BPM

## 2025-01-10 DIAGNOSIS — M25.512 ARTHRALGIA OF LEFT ACROMIOCLAVICULAR JOINT: Primary | ICD-10-CM

## 2025-01-10 NOTE — PROGRESS NOTES
Patient ID: [id number]     Chief Complaint: Pain ( L shoulder )    HPI:     Scooter Donato is a 74 y.o. male here today for Pain ( L shoulder ). Reviewed shoulder X-ray of left shoulder.       -------------------------------------    Allergic rhinitis    Anxiety disorder, unspecified    Asthma    Asthmatic bronchitis    Atherosclerosis of aorta    Chronic inflammatory arthritis    Claudication    Diabetic neuropathy    Dysphagia    Family history of aortic aneurysm    GERD (gastroesophageal reflux disease)    Heart murmur    HLD (hyperlipidemia)    HTN (hypertension)    Hypercalcemia    Hyperparathyroidism    Hypoparathyroidism, unspecified    Lower extremity edema    Male erectile dysfunction, unspecified    Multiple actinic keratoses    Nonrheumatic mitral (valve) insufficiency    Osteoarthritis    Personal history of colonic polyps    Tubular adenoma    Type 2 diabetes mellitus    Vitamin D deficiency        Past Surgical History:   Procedure Laterality Date    APPENDECTOMY      COLONOSCOPY  12/05/2022    Dr Himanshu Prajapati - [place], [place]    CYSTOSCOPY  2012    TONSILLECTOMY         Review of patient's allergies indicates:  No Known Allergies    Outpatient Medications Marked as Taking for the 1/10/25 encounter (Office Visit) with John Ford DO   Medication Sig Dispense Refill    albuterol (PROVENTIL/VENTOLIN HFA) 90 mcg/actuation inhaler INHALE 2 PUFFS BY MOUTH EVERY 4 - 6 HOURS AS NEEDED FOR WHEEZING 18 g 11    atorvastatin (LIPITOR) 10 MG tablet Take 1 tablet (10 mg total) by mouth every evening. 90 tablet 1    blood sugar diagnostic Strp 1 each by Misc.(Non-Drug; Combo Route) route Daily. 50 each 11    blood-glucose sensor (FREESTYLE MADDIE 3 SENSOR) Crystal 1 each by Misc.(Non-Drug; Combo Route) route every 14 (fourteen) days. 5 each 10    BREO ELLIPTA 100-25 mcg/dose diskus inhaler INHALE 1 PUFF INTO THE LUNGS ONCE DAILY 60 each 2    ezetimibe (ZETIA) 10 mg tablet Take 1 tablet (10 mg total) by mouth every  evening. 90 tablet 1    fluticasone propionate (FLONASE) 50 mcg/actuation nasal spray 1 spray (50 mcg total) by Each Nostril route 2 (two) times a day. 16 mL 11    lancets (LANCETS,THIN) Misc 1 each by Misc.(Non-Drug; Combo Route) route Daily. 50 each 11    metFORMIN (GLUCOPHAGE-XR) 500 MG ER 24hr tablet Take 2 tablets (1,000 mg total) by mouth 2 (two) times daily with meals. 360 tablet 1    omeprazole (PRILOSEC) 20 MG capsule TAKE 1 CAPSULE BY MOUTH EVERY DAY IN THE MORNING 90 capsule 1    semaglutide (OZEMPIC) 0.25 mg or 0.5 mg (2 mg/3 mL) pen injector INJECT 0.5 MG INTO THE SKIN EVERY 7 DAYS. 3 mL 5    valsartan (DIOVAN) 320 MG tablet Take 1 tablet (320 mg total) by mouth once daily. 90 tablet 1       Social History     Socioeconomic History    Marital status:    Tobacco Use    Smoking status: Never    Smokeless tobacco: Never   Substance and Sexual Activity    Alcohol use: Yes    Drug use: Never    Sexual activity: Not Currently     Partners: Female     Social Drivers of Health     Financial Resource Strain: Medium Risk (8/26/2024)    Overall Financial Resource Strain (CARDIA)     Difficulty of Paying Living Expenses: Somewhat hard   Food Insecurity: No Food Insecurity (8/7/2024)    Hunger Vital Sign     Worried About Running Out of Food in the Last Year: Never true     Ran Out of Food in the Last Year: Never true   Transportation Needs: No Transportation Needs (8/7/2024)    TRANSPORTATION NEEDS     Transportation : No   Physical Activity: Sufficiently Active (5/31/2023)    Exercise Vital Sign     Days of Exercise per Week: 6 days     Minutes of Exercise per Session: 30 min   Stress: No Stress Concern Present (5/31/2023)    [employer] [place] - Occupational Stress Questionnaire     Feeling of Stress : Only a little   Housing Stability: Low Risk  (8/7/2024)    Housing Stability Vital Sign     Unable to Pay for Housing in the Last Year: No     Homeless in the Last Year: No       "  Family History   Problem Relation Name Age of Onset    Lung cancer Mother      Hypertension Mother      Dementia Father      Kidney disease Father      Hypertension Father      Diabetes Maternal Grandmother      Diabetes Paternal Grandmother          Patient Care Team:  John Ford DO as PCP - General (Family Medicine)  [facility] -  Ulysses Hernandez MD as Consulting Provider (Rheumatology)  Lizz Britt as Consulting Provider (Internal Medicine)  Bam Sagastume MD as Consulting Provider (Dermatology)  Letha De Anda MD as Consulting Provider (Cardiology)  [facility]  Max Gonzalez MD as Resident (Cardiology)  [facility] (Optometry)     Subjective:     Review of Systems   Constitutional:  Negative for chills and fever.   Respiratory:  Negative for shortness of breath.    Cardiovascular:  Negative for chest pain.   Gastrointestinal:  Negative for constipation and diarrhea.   Musculoskeletal:  Positive for joint pain.   Neurological:  Negative for headaches.   Psychiatric/Behavioral:  The patient does not have insomnia.        See HPI for details  All Other ROS: Negative except as stated in HPI.       Objective:     /76 (BP Location: Left arm, Patient Position: Sitting)   Pulse 83   Temp 97.1 °F (36.2 °C)   Resp 19   Ht 5' 5" (1.651 m)   Wt 78.5 kg (173 lb)   SpO2 98%   BMI 28.79 kg/m²     Physical Exam  Vitals reviewed.   Constitutional:       General: He is not in acute distress.     Appearance: Normal appearance. He is not ill-appearing.   Cardiovascular:      Rate and Rhythm: Normal rate and regular rhythm.      Pulses: Normal pulses.      Heart sounds: Normal heart sounds. No murmur heard.     No friction rub. No gallop.   Pulmonary:      Effort: No respiratory distress.      Breath sounds: No wheezing, rhonchi or rales.   Musculoskeletal:         General: Tenderness present. No swelling.      Right lower leg: No " edema.      Left lower leg: No edema.      Comments: Tenderness of left AC joint   Skin:     General: Skin is warm and dry.   Neurological:      General: No focal deficit present.      Mental Status: He is alert.   Psychiatric:         Mood and Affect: Mood normal.         Behavior: Behavior normal.         Assessment/Plan:     1. Arthralgia of left acromioclavicular joint  -     Ambulatory referral/consult to Orthopedics; Future; Expected date: 01/10/2025        Follow up:     Follow up if symptoms worsen or fail to improve. In addition to their scheduled follow up, the patient has also been instructed to follow up on as needed basis.

## 2025-01-27 ENCOUNTER — OFFICE VISIT (OUTPATIENT)
Dept: ORTHOPEDICS | Facility: CLINIC | Age: 75
End: 2025-01-27
Payer: MEDICARE

## 2025-01-27 VITALS
WEIGHT: 170 LBS | BODY MASS INDEX: 28.32 KG/M2 | SYSTOLIC BLOOD PRESSURE: 132 MMHG | DIASTOLIC BLOOD PRESSURE: 78 MMHG | HEIGHT: 65 IN | HEART RATE: 82 BPM

## 2025-01-27 DIAGNOSIS — M25.512 ARTHRALGIA OF LEFT ACROMIOCLAVICULAR JOINT: ICD-10-CM

## 2025-01-27 PROCEDURE — 99203 OFFICE O/P NEW LOW 30 MIN: CPT | Mod: ,,, | Performed by: REHABILITATION UNIT

## 2025-01-27 NOTE — PROGRESS NOTES
Subjective:      Patient ID: Scooter Donato is a 74 y.o. male.    Chief Complaint: Pain of the Left Shoulder (Arthralgia of left acromioclavicular joint ongoing a few weeks now and keeps him up at night. Not taking anything for the pain. He had prior injections years before. Was doing PT for his back and pain started out of nowhere. He has more pain at night. )    HPI:   Scooter Donato is a 74 y.o. male who presents today for initial evaluation of his LUE    History of Present Illness    CHIEF COMPLAINT:  - Left shoulder pain    HPI:  Scooter presents with left shoulder pain that has been bothering him for six to eight weeks. Pain is now on both sides of his shoulder. He reports significant impact on his ability to rest, indicating he cannot sleep at night, although during the day it's good. He currently sleeps in his chair, finding it more comfortable than lying down.    He denies any recent injections into the left shoulder but recalls possibly having an injection on the right side about 20-30 years ago. He is left-handed.    He was previously doing therapy for his back, and a therapist had just started working on his shoulders before referring him to Dr. Lopez. Dr. Lopez took x-rays and then referred him to the current practitioner.    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 He believes these activities are now affecting his health.    Scooter does not deny any specific medical diagnoses.    PREVIOUS TREATMENTS:  - Scooter has been doing therapy for their back  - Scooter received an injection in the right shoulder approximately 20-30 years ago     WORK STATUS:  - Former  and     ROS:  Musculoskeletal: +joint pain          Past Medical History:   Diagnosis Date    Allergic rhinitis     Anxiety disorder, unspecified     Asthma     Asthmatic bronchitis      Atherosclerosis of aorta     Chronic inflammatory arthritis     Claudication     Diabetic neuropathy     Dysphagia     Family history of aortic aneurysm     GERD (gastroesophageal reflux disease)     Heart murmur     HLD (hyperlipidemia)     HTN (hypertension)     Hypercalcemia     Hyperparathyroidism     Hypoparathyroidism, unspecified     Lower extremity edema     Male erectile dysfunction, unspecified     Multiple actinic keratoses     Nonrheumatic mitral (valve) insufficiency     Osteoarthritis     Personal history of colonic polyps 2017    Tubular adenoma 2017    Type 2 diabetes mellitus     Vitamin D deficiency      Past Surgical History:   Procedure Laterality Date    APPENDECTOMY      COLONOSCOPY  12/05/2022    Dr Himanshu Prajapati - [place], [place]    CYSTOSCOPY  2012    TONSILLECTOMY       Social History     Socioeconomic History    Marital status:    Tobacco Use    Smoking status: Never    Smokeless tobacco: Never   Substance and Sexual Activity    Alcohol use: Yes    Drug use: Never    Sexual activity: Not Currently     Partners: Female     Social Drivers of Health     Financial Resource Strain: Medium Risk (8/26/2024)    Overall Financial Resource Strain (CARDIA)     Difficulty of Paying Living Expenses: Somewhat hard   Food Insecurity: No Food Insecurity (8/7/2024)    Hunger Vital Sign     Worried About Running Out of Food in the Last Year: Never true     Ran Out of Food in the Last Year: Never true   Transportation Needs: No Transportation Needs (8/7/2024)    TRANSPORTATION NEEDS     Transportation : No   Physical Activity: Sufficiently Active (5/31/2023)    Exercise Vital Sign     Days of Exercise per Week: 6 days     Minutes of Exercise per Session: 30 min   Stress: No Stress Concern Present (5/31/2023)    [employer] - Occupational Stress Questionnaire     Feeling of Stress : Only a little   Housing Stability: Low Risk  (8/7/2024)    Housing Stability Vital Sign      Unable to Pay for Housing in the Last Year: No     Homeless in the Last Year: No         Current Outpatient Medications:     albuterol (PROVENTIL/VENTOLIN HFA) 90 mcg/actuation inhaler, INHALE 2 PUFFS BY MOUTH EVERY 4 - 6 HOURS AS NEEDED FOR WHEEZING, Disp: 18 g, Rfl: 11    atorvastatin (LIPITOR) 10 MG tablet, Take 1 tablet (10 mg total) by mouth every evening., Disp: 90 tablet, Rfl: 1    blood sugar diagnostic Strp, 1 each by Misc.(Non-Drug; Combo Route) route Daily., Disp: 50 each, Rfl: 11    blood-glucose sensor (FREESTYLE MADDIE 3 SENSOR) Crystal, 1 each by Misc.(Non-Drug; Combo Route) route every 14 (fourteen) days., Disp: 5 each, Rfl: 10    ezetimibe (ZETIA) 10 mg tablet, Take 1 tablet (10 mg total) by mouth every evening., Disp: 90 tablet, Rfl: 1    metFORMIN (GLUCOPHAGE-XR) 500 MG ER 24hr tablet, Take 2 tablets (1,000 mg total) by mouth 2 (two) times daily with meals., Disp: 360 tablet, Rfl: 1    semaglutide (OZEMPIC) 0.25 mg or 0.5 mg (2 mg/3 mL) pen injector, INJECT 0.5 MG INTO THE SKIN EVERY 7 DAYS., Disp: 3 mL, Rfl: 5    valsartan (DIOVAN) 320 MG tablet, Take 1 tablet (320 mg total) by mouth once daily., Disp: 90 tablet, Rfl: 1    blood-glucose meter kit, Use as instructed, Disp: 1 each, Rfl: 0    BREO ELLIPTA 100-25 mcg/dose diskus inhaler, INHALE 1 PUFF INTO THE LUNGS ONCE DAILY, Disp: 60 each, Rfl: 2    flash glucose sensor (FREESTYLE MADDIE 14 DAY SENSOR) Kit, 1 each by Misc.(Non-Drug; Combo Route) route every 14 (fourteen) days. (Patient not taking: Reported on 1/27/2025), Disp: 2 kit, Rfl: 5    fluticasone propionate (FLONASE) 50 mcg/actuation nasal spray, 1 spray (50 mcg total) by Each Nostril route 2 (two) times a day., Disp: 16 mL, Rfl: 11    lancets (LANCETS,THIN) Misc, 1 each by Misc.(Non-Drug; Combo Route) route Daily., Disp: 50 each, Rfl: 11    omeprazole (PRILOSEC) 20 MG capsule, TAKE 1 CAPSULE BY MOUTH EVERY DAY IN THE MORNING, Disp: 90 capsule, Rfl: 1    pyrilamine-chlophedianoL 12.5-12.5 mg/5  "mL Liqd, Take 10 mLs by mouth 3 (three) times daily. (Patient not taking: Reported on 1/27/2025), Disp: 180 mL, Rfl: 0  Review of patient's allergies indicates:  No Known Allergies    /78 (BP Location: Right arm, Patient Position: Sitting)   Pulse 82   Ht 5' 5" (1.651 m)   Wt 77.1 kg (170 lb)   BMI 28.29 kg/m²     Comprehensive review of systems completed and negative except as per HPI.        Objective:   Head: Normocephalic, without obvious abnormality, atraumatic  Eyes: conjunctivae/corneas clear. EOM's intact  Ears: normal external appearance  Nose: Nares normal. Septum midline. Mucosa normal. No drainage  Throat: normal findings: lips normal without lesions  Lungs: unlabored breathing on room air  Chest wall: symmetric chest rise  Heart: regular rate and rhythm  Pulses: 2+ and symmetric  Skin: Skin color, texture, turgor normal. No rashes or lesions  Neurologic: Grossly normal    left SHOULDER    Appearance:   Normal; hypertrophic AC    Cervical Spine:   No ttp; full, painless ROM; negative Spurlings    Tenderness:   Mild anterior     AROM:   , Abduction 160, ER 70, IR L2    PROM:  same    Pain:  AROM: Positive  PROM:  Positive  End ROM: Positive  Supraspinatus strength testing: Positive  External rotation strength testing: Negative  Linette-scapular: Negative  Virtually all provacative maneuvers Negative    Strength:  Supraspinatus: intact  External rotation: intact  Linette-scapular: intact    Provocative Maneuvers:     Rotator Cuff/Biceps/AC Joint  Neer's Sign: Positive  Hawkin's Test: Positive  Painful arc: Negative  Belly Press: Negative  Bear Hugger Test: Negative  Hornblower's Sign: Negative  Speed's Test: Positive  Yergeson's Test: Positive  Cross Arm Abduction: Positive    Pulses: Palpable radial pulse    Neurological deficits: None    The patient has a warm and well-perfused upper extremity with capillary refill less than 2 seconds. Sensation is intact to light touch in terminal nerve " distributions. 5/5 ain/pin/uln. The patient has no palpable epitrochlear lymphadenopathy.      Assessment:     Imaging:   X-Ray Shoulder 2 or More Views Left  Narrative: EXAMINATION:  XR SHOULDER COMPLETE 2 OR MORE VIEWS LEFT    CLINICAL HISTORY:  Pain in left shoulder    TECHNIQUE:  Two or three views of the left shoulder were performed.    COMPARISON:  None    FINDINGS:  There are no fractures seen.  There degenerative changes in the AC joint.  There is no dislocation.  Impression: Degenerative changes in the AC joint    Electronically signed by: Toni Toledo MD  Date:    01/09/2025  Time:    18:48    X-rays show no acute bony abnormality.  Moderate AC arthritis.        1. Arthralgia of left acromioclavicular joint          Plan:       Orders Placed This Encounter    Ambulatory Referral/Consult to Physical Therapy/Occupational Therapy        Imaging and exam findings discussed.  Patient has some arthritis of his AC joint but also has findings consistent with bursitis/impingement.  We discussed his options.  His blood sugars elevated to around 200 which is significantly more than normal so we will hold off on an injection at this time.  He was referred to start therapy for his shoulder at the hospital in [place].  I am going to see him in [place] next week for steroid injection to the left shoulder as long as his blood sugars are improved.  Avoid aggravating activities.  Symptomatic management. All questions were answered. Patient happy and in agreement with the plan.     This note was generated with the assistance of ambient listening technology. Verbal consent was obtained by the patient and accompanying visitor(s) for the recording of patient appointment to facilitate this note. I attest to having reviewed and edited the generated note for accuracy, though some syntax or spelling errors may persist. Please contact the author of this note for any clarification.

## 2025-01-30 ENCOUNTER — TELEPHONE (OUTPATIENT)
Dept: ORTHOPEDICS | Facility: CLINIC | Age: 75
End: 2025-01-30
Payer: MEDICARE

## 2025-01-30 DIAGNOSIS — M25.512 ARTHRALGIA OF LEFT ACROMIOCLAVICULAR JOINT: Primary | ICD-10-CM

## 2025-01-30 RX ORDER — MELOXICAM 15 MG/1
15 TABLET ORAL DAILY
Qty: 30 TABLET | Refills: 0 | Status: SHIPPED | OUTPATIENT
Start: 2025-01-30

## 2025-02-03 ENCOUNTER — CLINICAL SUPPORT (OUTPATIENT)
Dept: DIABETES | Facility: CLINIC | Age: 75
End: 2025-02-03
Payer: MEDICARE

## 2025-02-03 DIAGNOSIS — E11.65 TYPE 2 DIABETES MELLITUS WITH HYPERGLYCEMIA, WITHOUT LONG-TERM CURRENT USE OF INSULIN: Primary | Chronic | ICD-10-CM

## 2025-02-03 PROCEDURE — 99211 OFF/OP EST MAY X REQ PHY/QHP: CPT | Mod: PBBFAC,PN | Performed by: DIETITIAN, REGISTERED

## 2025-02-03 PROCEDURE — G0108 DIAB MANAGE TRN  PER INDIV: HCPCS | Mod: PBBFAC,PN | Performed by: DIETITIAN, REGISTERED

## 2025-02-03 NOTE — PROGRESS NOTES
Diabetes Care Specialist Follow-up Note  Author: Jami Gallo RD  Date: 2/3/2025    Intake    Program Intake  Reason for Diabetes Program Visit:: Intervention  Type of Intervention:: Individual  Individual: Education  Education: Self-Management Skill Review  Current diabetes risk level:: low  In the last month, have you used the ER or been admitted to the hospital: No    Current Diabetes Treatment: Diet/Exercise, Oral Medications, DM Injectables  Oral Medication Type/Dose: Metformin 1000mg BID  DM Injectables Type/Dose: Ozempic .5mg    Continuous Glucose Monitoring  Patient has CGM: Yes  Personal CGM type:: Freestyle Kristie 3 with phone stephen  GMI Date: 02/03/25  GMI Value: 6.3 %        Lab Results   Component Value Date    HGBA1C 6.0 12/02/2024     A1c Pre Diabetes Care Specialist Intervention:  6.0%              There is no height or weight on file to calculate BMI.  Wt loss of 9 lbs since last visit on 8/26/24      Physical activity/Exercise:   At work        Lifestyle Coping Support & Clinical    Lifestyle/Coping/Support  Psychosocial/Coping Skills Assessment Completed: : Yes  Assessment indicates:: Adequate understanding  Area of need?: No         Diabetes Self-Management Skills Assessment    Medication Skills Assessment  Patient is able to identify current diabetes medications, dosages, and appropriate timing of medications.: yes  Patient reports problems or concerns with current medication regimen.: yes  Medication regimen problems/concerns:: concerned about side effects  Patient is  aware that some diabetes medications can cause low blood sugar?: Yes  Medication Skills Assessment Completed:: Yes  Assessment indicates:: Adequate understanding    Diabetes Disease Process/Treatment Options  Diabetes Type?: Type II  Diabetes Disease Process/Treatment Options: Skills Assessment Completed: Yes  Assessment indicates:: Adequate understanding  Area of need?: No    Nutrition/Healthy Eating  Nutrition/Healthy Eating  Skills Assessment Completed:: Yes  Assessment indicates:: Adequate understanding  Area of need?: No         Home Blood Glucose Monitoring  Patient states that blood sugar is checked at home daily.: yes  Monitoring Method:: personal continuous glucose monitor  Personal CGM type:: Freestyle Kristie 3 with phone stephen   What is your current Time in Range?: 99%  Home Blood Glucose Monitoring Skills Assessment Completed: : Yes  Assessment indicates:: Instruction Needed  Area of need?: Yes    Acute Complications  Have you ever had hypoglycemia (low BG 70 or less)?: yes  How do you treat hypoglycemia?: chocolate  Have you ever had hyperglycemia (high  or more)?: no  Acute Complications Skills Assessment Completed: : Yes  Assessment indicates:: Instruction Needed  Area of need?: Yes    Chronic Complications  Chronic Complications Skills Assessment Completed: : No  Deferred due to:: Time  Area of need?: Deferred      During today's follow-up visit,  the following areas required further assessment and content was provided/reviewed.    Based on today's diabetes care assessment, the following areas of need were identified:      Identified Areas of Need      Medication/Current Diabetes Treatment:     Lifestyle Coping/Support: No   Diabetes Disease Process/Treatment Options: No   Nutrition/Healthy Eating: No    Physical Activity/Exercise:      Home Blood Glucose Monitoring: Yes - see care plan   Acute Complications: Yes - see care plan   Chronic Complications: Deferred       Today's interventions were provided through individual discussion, instruction, and written materials were provided.    Patient verbalized understanding of instruction and written materials.  Pt was able to return back demonstration of instructions today. Patient understood key points, needs reinforcement and further instruction.     Diabetes Self-Management Care Plan Review and Evaluation of Progress:    During today's follow-up Camille Diabetes  Self-Management Care Plan progress was reviewed and progress was evaluated including his/her input. Scooter has agreed to continue his/her journey to improve/maintain overall diabetes control by continuing to set health goals. See care plan progress below.      Care Plan: Diabetes Management   Updates made since 2/4/2024 12:00 AM        Problem: Blood Glucose Self-Monitoring Resolved 2/3/2025        Goal: Patient agrees to use PCP-provided CGM sample to monitor blood sugar from 10-14 days. Completed 2/3/2025   Start Date: 8/7/2024   Expected End Date: 3/17/2025   This Visit's Progress: Met   Recent Progress: On track   Priority: High   Barriers: No Barriers Identified   Note:    8/7/24 - Provided information for stephen download. Pt states his daughter will help him with starting the CGM sample and will reach out to DM ed for assistance, as needed. Pt and wife agree to return in 2-3 weeks for AGP review.    8/26/24 - pt currently using sample of Freestyle Kristie 3 with phone stephen. See media for full Libreview report. Insurance denied request for Freestyle Kristie 14 sent to pharmacy. Instructed that it may be due to non-insulin dependent or needing to go to [facility]. Pt will will reach out to insurance for verification. Informed that the over-the-counter Dexcom Stelo will be available soon without prescription, if interested.     2/3/25 - pt requesting libreview records indicating hypoglycemic events. Noted one low on 12/8/24 and several days in October 20, 21, 25, 26 and 27. Libreview reports in . Lows ranging 43-69. Currently using Freestyle Kristie 3 with phone stephen. Unsure if insurance will pay. Offered information regarding Dexcom Stelo availability without prescription for self-pay.       Task: Reviewed the importance of self-monitoring blood glucose and keeping logs. Completed 8/7/2024        Problem: Medications Resolved 2/3/2025        Goal: Patient Agrees to take Diabetes Medication(s) Ozempic .5 and  Metformin 500 mg ER BID as prescribed. Completed 2/3/2025   Start Date: 8/7/2024   Expected End Date: 8/26/2024   This Visit's Progress: Met   Priority: High   Barriers: No Barriers Identified   Note:    8/7/24 - reviewed including weekly strength training to manage side effects of wt loss and muscle loss. Pt states he does weights and stationary bike at home. Will refer to Pharmacy Assistance Program due to high out-of-pocket costs related to Medicare Taylor Dietrich.    8/26/24 - printed information needed to complete pharmacy assistance application and provided contact phone #. Pt states that he decreased Ozempic to .25mg due to fear of wt loss and noticed and increase of GMI so will go back to .5 mg. Noted that his wt has been stable at 197#       Task: Reviewed with patient all current diabetes medications and provided basic review of the purpose, dosage, frequency, side effects, and storage of both oral and injectable diabetes medications. Completed 8/7/2024        Task: Reviewed possible resources for acquiring cost prohibitive medication. Completed 8/7/2024        Problem: Acute Complications         Goal: Patient agrees to identify and manage signs and symptoms of high/low blood sugar (hyper/hypoglycemia) by keeping a log of events and using proper treatment.    Start Date: 2/3/2025   Expected End Date: 3/24/2025   Priority: High        Task: Reviewed proper treatment of hypoglycemia with the rule of 15--patient to eat 15g simple carbohydrate (4 glucose tablets, 1 glucose gel, 5 pieces hard candy, ½ cup fruit juice, ½ can regular soda, etc) and wait 15 minutes and recheck home glucose. Completed 2/3/2025        Task: Reviewed common causes and precautions to help prevent hyper/hypoglycemic events. Completed 2/3/2025        Task: Reviewed signs and symptoms of hyper/hypoglycemia, what range is considered to be hyper/hypoglycemia, and when to seek further medical attention. Completed 2/3/2025          Follow Up  Plan     Follow up in about 6 weeks (around 3/17/2025).    Today's care plan and follow up schedule was discussed with patient.  Scooter verbalized understanding of the care plan, goals, and agrees to follow up plan.        The patient was encouraged to communicate with his/her health care provider/physician and care team regarding his/her condition(s) and treatment.  I provided the patient with my contact information today and encouraged to contact me via phone or [facility] Patient Portal as needed.     Length of Visit   Total Time: 60 Minutes

## 2025-02-04 ENCOUNTER — OFFICE VISIT (OUTPATIENT)
Dept: ORTHOPEDICS | Facility: CLINIC | Age: 75
End: 2025-02-04
Payer: MEDICARE

## 2025-02-04 VITALS
BODY MASS INDEX: 28.32 KG/M2 | HEART RATE: 85 BPM | HEIGHT: 65 IN | DIASTOLIC BLOOD PRESSURE: 76 MMHG | SYSTOLIC BLOOD PRESSURE: 153 MMHG | WEIGHT: 170 LBS

## 2025-02-04 DIAGNOSIS — M25.512 ARTHRALGIA OF LEFT ACROMIOCLAVICULAR JOINT: Primary | ICD-10-CM

## 2025-02-04 PROCEDURE — 20610 DRAIN/INJ JOINT/BURSA W/O US: CPT | Mod: LT,,, | Performed by: REHABILITATION UNIT

## 2025-02-04 PROCEDURE — 99213 OFFICE O/P EST LOW 20 MIN: CPT | Mod: 25,,, | Performed by: REHABILITATION UNIT

## 2025-02-04 RX ORDER — LIDOCAINE HYDROCHLORIDE 10 MG/ML
3 INJECTION, SOLUTION INFILTRATION; PERINEURAL
Status: DISCONTINUED | OUTPATIENT
Start: 2025-02-04 | End: 2025-02-04 | Stop reason: HOSPADM

## 2025-02-04 RX ORDER — METHYLPREDNISOLONE ACETATE 80 MG/ML
40 INJECTION, SUSPENSION INTRA-ARTICULAR; INTRALESIONAL; INTRAMUSCULAR; SOFT TISSUE
Status: DISCONTINUED | OUTPATIENT
Start: 2025-02-04 | End: 2025-02-04 | Stop reason: HOSPADM

## 2025-02-04 RX ADMIN — METHYLPREDNISOLONE ACETATE 40 MG: 80 INJECTION, SUSPENSION INTRA-ARTICULAR; INTRALESIONAL; INTRAMUSCULAR; SOFT TISSUE at 11:02

## 2025-02-04 RX ADMIN — LIDOCAINE HYDROCHLORIDE 3 ML: 10 INJECTION, SOLUTION INFILTRATION; PERINEURAL at 11:02

## 2025-02-04 NOTE — PROGRESS NOTES
Subjective:      Patient ID: Scooter Donato is a 74 y.o. male.    Chief Complaint: Injections of the Left Shoulder (Patient is here for lt shoulder injection. Has not started PT would like to discuss about it today. )    HPI:   Scooter Donato is a 74 y.o. male who presents today for follow up evaluation of his LUE. Patient is here for left shoulder steroid injection. He states he hasn't started PT yet but will start on Monday. His blood sugar is 109 today.     History of Present Illness    CHIEF COMPLAINT:  - Left shoulder pain    Initial HPI:  Scooter presents with left shoulder pain that has been bothering him for six to eight weeks. Pain is now on both sides of his shoulder. He reports significant impact on his ability to rest, indicating he cannot sleep at night, although during the day it's good. He currently sleeps in his chair, finding it more comfortable than lying down.    He denies any recent injections into the left shoulder but recalls possibly having an injection on the right side about 20-30 years ago. He is left-handed.    He was previously doing therapy for his back, and a therapist had just started working on his shoulders before referring him to Dr. Lopez. Dr. Lopez took x-rays and then referred him to the current practitioner.    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 He believes these activities are now affecting his health.    Scooter does not deny any specific medical diagnoses.    PREVIOUS TREATMENTS:  - Scooter has been doing therapy for their back  - Scooter received an injection in the right shoulder approximately 20-30 years ago     WORK STATUS:  - Former  and     ROS:  Musculoskeletal: +joint pain          Past Medical History:   Diagnosis Date    Allergic rhinitis     Anxiety disorder, unspecified     Asthma     Asthmatic bronchitis      Atherosclerosis of aorta     Chronic inflammatory arthritis     Claudication     Diabetic neuropathy     Dysphagia     Family history of aortic aneurysm     GERD (gastroesophageal reflux disease)     Heart murmur     HLD (hyperlipidemia)     HTN (hypertension)     Hypercalcemia     Hyperparathyroidism     Hypoparathyroidism, unspecified     Lower extremity edema     Male erectile dysfunction, unspecified     Multiple actinic keratoses     Nonrheumatic mitral (valve) insufficiency     Osteoarthritis     Personal history of colonic polyps 2017    Tubular adenoma 2017    Type 2 diabetes mellitus     Vitamin D deficiency      Past Surgical History:   Procedure Laterality Date    APPENDECTOMY      COLONOSCOPY  12/05/2022    Dr Himanshu Prajapati - [place], [place]    CYSTOSCOPY  2012    TONSILLECTOMY       Social History     Socioeconomic History    Marital status:    Tobacco Use    Smoking status: Never    Smokeless tobacco: Never   Substance and Sexual Activity    Alcohol use: Yes    Drug use: Never    Sexual activity: Not Currently     Partners: Female     Social Drivers of Health     Financial Resource Strain: Medium Risk (8/26/2024)    Overall Financial Resource Strain (CARDIA)     Difficulty of Paying Living Expenses: Somewhat hard   Food Insecurity: No Food Insecurity (8/7/2024)    Hunger Vital Sign     Worried About Running Out of Food in the Last Year: Never true     Ran Out of Food in the Last Year: Never true   Transportation Needs: No Transportation Needs (8/7/2024)    TRANSPORTATION NEEDS     Transportation : No   Physical Activity: Sufficiently Active (5/31/2023)    Exercise Vital Sign     Days of Exercise per Week: 6 days     Minutes of Exercise per Session: 30 min   Stress: No Stress Concern Present (5/31/2023)    [employer] [place] [employer] - Occupational Stress Questionnaire     Feeling of Stress : Only a little   Housing Stability: Low Risk  (8/7/2024)    Housing Stability Vital Sign      Unable to Pay for Housing in the Last Year: No     Homeless in the Last Year: No         Current Outpatient Medications:     albuterol (PROVENTIL/VENTOLIN HFA) 90 mcg/actuation inhaler, INHALE 2 PUFFS BY MOUTH EVERY 4 - 6 HOURS AS NEEDED FOR WHEEZING, Disp: 18 g, Rfl: 11    atorvastatin (LIPITOR) 10 MG tablet, Take 1 tablet (10 mg total) by mouth every evening., Disp: 90 tablet, Rfl: 1    blood sugar diagnostic Strp, 1 each by Misc.(Non-Drug; Combo Route) route Daily., Disp: 50 each, Rfl: 11    blood-glucose meter kit, Use as instructed, Disp: 1 each, Rfl: 0    blood-glucose sensor (FREESTYLE MADDIE 3 SENSOR) Crystal, 1 each by Misc.(Non-Drug; Combo Route) route every 14 (fourteen) days., Disp: 5 each, Rfl: 10    BREO ELLIPTA 100-25 mcg/dose diskus inhaler, INHALE 1 PUFF INTO THE LUNGS ONCE DAILY, Disp: 60 each, Rfl: 2    ezetimibe (ZETIA) 10 mg tablet, Take 1 tablet (10 mg total) by mouth every evening., Disp: 90 tablet, Rfl: 1    flash glucose sensor (FREESTYLE MADDIE 14 DAY SENSOR) Kit, 1 each by Misc.(Non-Drug; Combo Route) route every 14 (fourteen) days. (Patient not taking: Reported on 1/10/2025), Disp: 2 kit, Rfl: 5    fluticasone propionate (FLONASE) 50 mcg/actuation nasal spray, 1 spray (50 mcg total) by Each Nostril route 2 (two) times a day., Disp: 16 mL, Rfl: 11    lancets (LANCETS,THIN) Misc, 1 each by Misc.(Non-Drug; Combo Route) route Daily., Disp: 50 each, Rfl: 11    meloxicam (MOBIC) 15 MG tablet, Take 1 tablet (15 mg total) by mouth once daily., Disp: 30 tablet, Rfl: 0    metFORMIN (GLUCOPHAGE-XR) 500 MG ER 24hr tablet, Take 2 tablets (1,000 mg total) by mouth 2 (two) times daily with meals., Disp: 360 tablet, Rfl: 1    omeprazole (PRILOSEC) 20 MG capsule, TAKE 1 CAPSULE BY MOUTH EVERY DAY IN THE MORNING, Disp: 90 capsule, Rfl: 1    pyrilamine-chlophedianoL 12.5-12.5 mg/5 mL Liqd, Take 10 mLs by mouth 3 (three) times daily. (Patient not taking: Reported on 1/10/2025), Disp: 180 mL, Rfl: 0    semaglutide  "(OZEMPIC) 0.25 mg or 0.5 mg (2 mg/3 mL) pen injector, INJECT 0.5 MG INTO THE SKIN EVERY 7 DAYS., Disp: 3 mL, Rfl: 5    valsartan (DIOVAN) 320 MG tablet, Take 1 tablet (320 mg total) by mouth once daily., Disp: 90 tablet, Rfl: 1  Review of patient's allergies indicates:  No Known Allergies    BP (!) 153/76 Comment: Stated is nervous about injection today  Pulse 85   Ht 5' 5" (1.651 m)   Wt 77.1 kg (169 lb 15.6 oz)   BMI 28.29 kg/m²     Comprehensive review of systems completed and negative except as per HPI.        Objective:   Head: Normocephalic, without obvious abnormality, atraumatic  Eyes: conjunctivae/corneas clear. EOM's intact  Ears: normal external appearance  Nose: Nares normal. Septum midline. Mucosa normal. No drainage  Throat: normal findings: lips normal without lesions  Lungs: unlabored breathing on room air  Chest wall: symmetric chest rise  Heart: regular rate and rhythm  Pulses: 2+ and symmetric  Skin: Skin color, texture, turgor normal. No rashes or lesions  Neurologic: Grossly normal    left SHOULDER    Appearance:   Normal; hypertrophic AC    Cervical Spine:   No ttp; full, painless ROM; negative Spurlings    Tenderness:   Mild anterior     AROM:   , Abduction 160, ER 70, IR L2    PROM:  same    Pain:  AROM: Positive  PROM:  Positive  End ROM: Positive  Supraspinatus strength testing: Positive  External rotation strength testing: Negative  Linette-scapular: Negative  Virtually all provacative maneuvers Negative    Strength:  Supraspinatus: intact  External rotation: intact  Linette-scapular: intact    Provocative Maneuvers:     Rotator Cuff/Biceps/AC Joint  Neer's Sign: Positive  Hawkin's Test: Positive  Painful arc: Negative  Belly Press: Negative  Bear Hugger Test: Negative  Hornblower's Sign: Negative  Speed's Test: Positive  Yergeson's Test: Positive  Cross Arm Abduction: Positive    Pulses: Palpable radial pulse    Neurological deficits: None    The patient has a warm and well-perfused " upper extremity with capillary refill less than 2 seconds. Sensation is intact to light touch in terminal nerve distributions. 5/5 ain/pin/uln. The patient has no palpable epitrochlear lymphadenopathy.      Assessment:     Imaging:   X-Ray Shoulder 2 or More Views Left  Narrative: EXAMINATION:  XR SHOULDER COMPLETE 2 OR MORE VIEWS LEFT    CLINICAL HISTORY:  Pain in left shoulder    TECHNIQUE:  Two or three views of the left shoulder were performed.    COMPARISON:  None    FINDINGS:  There are no fractures seen.  There degenerative changes in the AC joint.  There is no dislocation.  Impression: Degenerative changes in the AC joint    Electronically signed by: Toni Toledo MD  Date:    01/09/2025  Time:    18:48    X-rays show no acute bony abnormality.  Moderate AC arthritis.        No diagnosis found.        Plan:               Imaging and exam findings discussed.  Patient has some arthritis of his AC joint but also has findings consistent with bursitis/impingement.  We discussed his options.  Left shoulder CSI given in clinic today. Tolerated injection well. Educated patient on post injection care. Informed patient he can start therapy on Monday. Avoid aggravating activities.  Symptomatic management. All questions were answered. Patient happy and in agreement with the plan.     Adolph Romero MD personally performed the services described in this documentation, including but not limited to patient's history, physical examination, and assessment and plan of care. All medical record entries made by Alda Menchaca PA-C were performed at his direction and in his presence. The medical record was reviewed and is accurate and complete.

## 2025-02-04 NOTE — PROCEDURES
Large Joint Aspiration/Injection: L subacromial bursa    Date/Time: 2/4/2025 11:15 AM    Performed by: Adolph Romero MD  Authorized by: Adolph Romero MD    Consent Done?:  Yes (Verbal)  Indications:  Arthritis and pain  Timeout: prior to procedure the correct patient, procedure, and site was verified    Local anesthesia used?: No      Details:  Needle Size:  22 G  Ultrasonic Guidance for needle placement?: No    Approach:  Posterior  Location:  Shoulder  Site:  L subacromial bursa  Medications:  3 mL LIDOcaine HCL 10 mg/ml (1%) 10 mg/mL (1 %); 40 mg methylPREDNISolone acetate 80 mg/mL

## 2025-02-13 ENCOUNTER — CLINICAL SUPPORT (OUTPATIENT)
Dept: REHABILITATION | Facility: HOSPITAL | Age: 75
End: 2025-02-13
Attending: REHABILITATION UNIT
Payer: MEDICARE

## 2025-02-13 DIAGNOSIS — M25.512 ARTHRALGIA OF LEFT ACROMIOCLAVICULAR JOINT: ICD-10-CM

## 2025-02-13 PROCEDURE — 97140 MANUAL THERAPY 1/> REGIONS: CPT

## 2025-02-13 PROCEDURE — 97110 THERAPEUTIC EXERCISES: CPT

## 2025-02-13 PROCEDURE — 97163 PT EVAL HIGH COMPLEX 45 MIN: CPT

## 2025-02-13 PROCEDURE — 97530 THERAPEUTIC ACTIVITIES: CPT

## 2025-02-13 NOTE — PROGRESS NOTES
Outpatient Rehab    Physical Therapy Evaluation    Patient Name: Bereket Donato  MRN: [id number]  YOB: 1950  Today's Date: 2/13/2025    Therapy Diagnosis:   Encounter Diagnosis   Name Primary?    Arthralgia of left acromioclavicular joint      Physician: Adolph Romero MD    Physician Orders: Eval and Treat  Medical Diagnosis: Left shoulder pain    Visit # / Visits Authorized:  0 / medically necessary   Date of Evaluation:  2/13/2025   Insurance Authorization Period: medically necessary  Plan of Care Certification:  2/13/2025 to 5/13/2025      Time In: 0730   Time Out: 0840  Total Time: 70   Total Billable Time: 70    Intake Outcome Measure for FOTO Survey    Therapist reviewed FOTO scores for Bereket Donato on 2/13/2025.   FOTO report - see Media section or FOTO account episode details.     Intake Score: 57%         Subjective   History of Present Illness  Bereket is a 74 y.o. male  According to the patient's chart, Bereket has a past medical history of Allergic rhinitis, Anxiety disorder, unspecified, Asthma, Asthmatic bronchitis, Atherosclerosis of aorta, Chronic inflammatory arthritis, Claudication, Diabetic neuropathy, Dysphagia, Family history of aortic aneurysm, GERD (gastroesophageal reflux disease), Heart murmur, HLD (hyperlipidemia), HTN (hypertension), Hypercalcemia, Hyperparathyroidism, Hypoparathyroidism, unspecified, Lower extremity edema, Male erectile dysfunction, unspecified, Multiple actinic keratoses, Nonrheumatic mitral (valve) insufficiency, Osteoarthritis, Personal history of colonic polyps, Tubular adenoma, Type 2 diabetes mellitus, and Vitamin D deficiency. Bereket has a past surgical history that includes Tonsillectomy; Cystoscopy (2012); Colonoscopy (12/05/2022); and Appendectomy.                History of Present Condition/Illness: Patient referred for left shoulder pain.  He reports pain x several months at both shoulders and neck, but the left shoulder pain limits sleep and  functional activity.  He recently had a MD visit with an injection that significantly helped the pain.  He feels that since the injection, the pain is much improved and he is able to move his neck and arm much easier.  He is not sure that he now needs therapy due to the improvement since the injection.    Pain     Patient reports a current pain level of 0/10.  Pain at worst is reported as 10/10.   Location: left shoulder  Clinical Progression (since onset): Improved  Pain-Aggravating Factors: Sleeping         Review of Systems  Patient reports: Night Pain and Diabetes  Patient denies: Rheumatoid Arthritis          Past Medical History/Physical Systems Review:   Scooter Donato  has a past medical history of Allergic rhinitis, Anxiety disorder, unspecified, Asthma, Asthmatic bronchitis, Atherosclerosis of aorta, Chronic inflammatory arthritis, Claudication, Diabetic neuropathy, Dysphagia, Family history of aortic aneurysm, GERD (gastroesophageal reflux disease), Heart murmur, HLD (hyperlipidemia), HTN (hypertension), Hypercalcemia, Hyperparathyroidism, Hypoparathyroidism, unspecified, Lower extremity edema, Male erectile dysfunction, unspecified, Multiple actinic keratoses, Nonrheumatic mitral (valve) insufficiency, Osteoarthritis, Personal history of colonic polyps, Tubular adenoma, Type 2 diabetes mellitus, and Vitamin D deficiency.    Scooter Donato  has a past surgical history that includes Tonsillectomy; Cystoscopy (2012); Colonoscopy (12/05/2022); and Appendectomy.    Scooter has a current medication list which includes the following prescription(s): albuterol, atorvastatin, blood sugar diagnostic, blood-glucose meter, freestyle gerard 3 sensor, breo ellipta, ezetimibe, freestyle gerard 14 day sensor, fluticasone propionate, lancets, meloxicam, metformin, omeprazole, pyrilamine-chlophedianol, ozempic, and valsartan.    Review of patient's allergies indicates:  No Known Allergies     Objective   Posture                  Scoliosis with left thoracic rotation and right shoulder elevated in sitting.    Upper Extremity Sensation  General Upper Extremity Sensation  Intact: Right and Left  Right Upper Extremity Sensation  Intact: Light Touch, Sharp/Dull Discrimination, Kinesthesia, Proprioception, and Hot/Cold Discrimination  Right Upper Extremity Sensation Stocking Glove Pattern: No    Left Upper Extremity Sensation  Intact: Light Touch, Sharp/Dull Discrimination, Kinesthesia, Proprioception, and Hot/Cold Discrimination  Left Upper Extremity Sensation Stocking Glove Pattern: No             Shoulder Palpation          Left Shoulder Palpation  Abnormal: Muscle and Tendon/Ligament  Left Shoulder Muscle Palpation Observations: bicep restrictions  Left Shoulder Tendon/Ligament Palpation Observations: bicep tendon restrictions  Tender along bicep tendon         Subcranial Range of Motion   Active Restricted? Passive Restricted? Pain   Flexion         Protraction         Retraction           Cervical Range of Motion   Active (deg) Passive (deg) Pain   Flexion 20       Extension 35       Right Lateral Flexion 15       Right Rotation         Left Lateral Flexion 20       Left Rotation                Shoulder Range of Motion  Right Shoulder   Active (deg) Passive (deg) Pain   Flexion 158       Extension         Scaption 175       ABduction 175       ADduction         Horizontal ABduction         Horizontal ADduction         External Rotation (Shoulder ABducted 0 degrees)         External Rotation (Shoulder ABducted 45 degrees) 76       External Rotation (Shoulder ABducted 90 degrees)         Internal Rotation (Shoulder ABducted 0 degrees)         Internal Rotation (Shoulder ABducted 45 degrees) 72       Internal Rotation (Shoulder ABducted 90 degrees)           Left Shoulder   Active (deg) Passive (deg) Pain   Flexion 135       Extension         Scaption 115       ABduction 115       ADduction         Horizontal ABduction         Horizontal  ADduction         External Rotation (Shoulder ABducted 0 degrees)         External Rotation (Shoulder ABducted 45 degrees) 70       External Rotation (Shoulder ABducted 90 degrees)         Internal Rotation (Shoulder ABducted 0 degrees)         Internal Rotation (Shoulder ABducted 45 degrees) 35       Internal Rotation (Shoulder ABducted 90 degrees)             Shoulder, Elbow, or Forearm Range of Motion Details: Left shoulder hike with elevation. Abnormal glenohumeral motions obvious. Left elbow contracture/flexion preference with decreased supination ability.      Elbow/Forearm Range of Motion   Left Elbow/Forearm   Active (deg) Passive (deg) Pain   Flexion         Extension -20       Forearm Pronation         Forearm Supination                       Shoulder Strength - Planes of Motion   Right Strength Right Pain Left Strength Left  Pain   Flexion 4-   3+     Extension 4-   3+     ABduction 3+   2     ADduction 3+   3-     Horizontal ABduction 3+   2     Horizontal ADduction     2+     Internal Rotation 0°           Internal Rotation 90° 3+   2     External Rotation 0°           External Rotation 90° 3+   2         Shoulder Strength - Rotator Cuff Muscles   Right Strength Right Pain Left Strength Left  Pain   Supraspinatus 3+   2     Infraspinatus 3+   2     Teres Minor           Subscapularis 3+   3-       Elbow Strength   Right Strength Right Pain Left Strength Left  Pain   Flexion (C6)     3+     Extension (C7)                Forearm Strength   Right Strength Right Pain Left Strength Left  Pain   Pronation           Supination     3                 Shoulder Special Tests  Shoulder Stability Tests  Negative: Right Apprehension and Left Apprehension  Shoulder Labral Tests  Negative: Right Clunk and Left Clunk  Rotator Cuff Tests  Positive: Left Empty Can and Left External Rotation Lag Sign  Negative: Right Drop Arm, Left Drop Arm, and Right Empty Can  Positive: Left Full Can and Left Internal Rotation Resistance  Strength  Negative: Right Full Can, Right Lift Off, and Left Lift Off  Impingement Tests  Positive: Left Kearns-Aubrey and Left Painful Arc  Negative: Right Kearns-Aubrey  Other Shoulder Tests  Positive: Left Speed's                Shoulder Joint Mobility  Left Shoulder Mobility  Hypomobile: Anterior Capsule Mobility, Posterior Capsule Mobility, Inferior Capsule Mobility, and Acromioclavicular            Right Spinal Mobility  Hypomobile: Cervical Mobility and Thoracic Mobility  Left Spinal Mobility  Hypomobile: Left Cervical Spine Mobility and Left Thoracic Spine Mobility  Apley's functional reach test: R ER to T2, L ER to C5; R IR to T12, L IR to T10           Treatment:  Therapeutic Activity  Therapeutic Activity 1: Plan of care  Therapeutic Activity 2: HEP  Therapeutic Activity 3: Patient education    Assessment & Plan   Assessment  Bereket                 Assessment Details: Patient presents with limited left shoulder ROM, most limited with internal rotation. He has less strength at left shoulder elevation compared to right and has abnormal movement patterns at left glenohumeral joint demonstrating elevated shoulder/hiking with overhead movement attempts. He also has limited joint mobility with mobilization attempts and decreased left bicep flexibility and function. Patient has pain with sleeping that limits rest and disrupts functional activity tolerance.  Patient would benefit from therapy to increase ROM and strength at left shoulder, to improve pain levels and functional activity tolerance and use of left UE.  Discussion regarding plan of care.  Patient in agreement.    Plan  From a physical therapy perspective, the patient would benefit from: Skilled Rehab Services    Planned therapy interventions include: Therapeutic exercise, Therapeutic activities, Neuromuscular re-education, and Manual therapy.    Planned modalities to include: Cryotherapy (cold pack), Thermotherapy (hot pack), and Ultrasound.         Visit Frequency: 2 times Per Week for 12 Weeks.       This plan was discussed with Patient.   Discussion participants: Agreed Upon Plan of Care             Patient's spiritual, cultural, and educational needs considered and patient agreeable to plan of care and goals.           Goals:   Active       Functional outcome       Patient will show a significant change in FOTO patient-reported outcome tool to demonstrate subjective improvement       Start:  02/13/25    Expected End:  05/13/25            Patient will demonstrate independence in home program for support of progression       Start:  02/13/25    Expected End:  05/13/25               Hand and arm use       Patient will report at least 50% overall perceived improvement        Start:  02/13/25    Expected End:  05/13/25               Pain       Patient will report a 2 point reduction in pain while sleeping at night       Start:  02/13/25    Expected End:  05/13/25               Range of Motion       Patient will achieve left shoulder internal rotation of 50 degrees in 45 degrees abd       Start:  02/13/25    Expected End:  05/13/25               Strength       Patient will achieve left shoulder strength of 4/5 in all planes in neutral       Start:  02/13/25    Expected End:  05/13/25                Nelly Smith, PT

## 2025-02-18 ENCOUNTER — CLINICAL SUPPORT (OUTPATIENT)
Dept: REHABILITATION | Facility: HOSPITAL | Age: 75
End: 2025-02-18
Payer: MEDICARE

## 2025-02-18 DIAGNOSIS — M25.512 ARTHRALGIA OF LEFT ACROMIOCLAVICULAR JOINT: Primary | ICD-10-CM

## 2025-02-18 PROCEDURE — 97110 THERAPEUTIC EXERCISES: CPT

## 2025-02-18 PROCEDURE — 97140 MANUAL THERAPY 1/> REGIONS: CPT

## 2025-02-18 PROCEDURE — 97112 NEUROMUSCULAR REEDUCATION: CPT

## 2025-02-18 PROCEDURE — 97010 HOT OR COLD PACKS THERAPY: CPT

## 2025-02-18 NOTE — PROGRESS NOTES
Outpatient Rehab    Physical Therapy Visit    Patient Name: Bereket Donato  MRN: [id number]  YOB: 1950  Today's Date: 2/18/2025    Therapy Diagnosis: No diagnosis found.  Physician: Adolph Romero MD    Physician Orders: Eval and Treat  Medical Diagnosis: L shoulder pain    Visit # / Visits Authorized:  1 / medically necessary   Date of Evaluation:  2/13/2025  Insurance Authorization Period: 2/13/2025 to medically necessary  Plan of Care Certification:  2/13/2025 to 5/13/2025      Time In: 0727   Time Out: 0827  Total Time: 60   Total Billable Time: 60    FOTO:  Intake Score:  %  Survey Score 1:  %  Survey Score 2:  %         Subjective   Patient reports persistent left shoulder pain and weakness since evaluation. He says he was unable to  a bag of trash this weekend..         Objective            Treatment:  Therapeutic Exercise  Therapeutic Exercise Activity 1: Shoulder ROM  Therapeutic Exercise Activity 2: scapular strengthening    Manual Therapy  Manual Therapy Activity 1: grade II mobs at GH joint AP and superior/inferior    Balance/Neuromuscular Re-Education  Balance/Neuromuscular Re-Education Activity 1: correct glenohumeral and scapulothoracic rhythm    Therapeutic Activity  Therapeutic Activity 1: reaching and functional UE use    Modalities  Moist Heat (min): 10  Cryotherapy (Minutes\Location): 10    Assessment & Plan   Assessment: Exercise focus on manual stretching and PROM followed by active movements and scapular strengthening at left shoulder.  Bicep involvement is obvious.  VC for correct movement patterns. Manual subscap release allows for increased range.  Good participation and adjustments with VC.       Patient will continue to benefit from skilled outpatient physical therapy to address the deficits listed in the problem list box on initial evaluation, provide pt/family education and to maximize pt's level of independence in the home and community environment.      Patient's spiritual, cultural, and educational needs considered and patient agreeable to plan of care and goals.           Plan: Patient will continue to benefit from therapy for progressive AROM and strengthening at left shoulder and B scapular regions.    Goals:   Active       Functional outcome       Patient will show a significant change in FOTO patient-reported outcome tool to demonstrate subjective improvement       Start:  02/13/25    Expected End:  05/13/25            Patient will demonstrate independence in home program for support of progression       Start:  02/13/25    Expected End:  05/13/25               Hand and arm use       Patient will report at least 50% overall perceived improvement        Start:  02/13/25    Expected End:  05/13/25               Pain       Patient will report a 2 point reduction in pain while sleeping at night       Start:  02/13/25    Expected End:  05/13/25               Range of Motion       Patient will achieve left shoulder internal rotation of 50 degrees in 45 degrees abd       Start:  02/13/25    Expected End:  05/13/25               Strength       Patient will achieve left shoulder strength of 4/5 in all planes in neutral       Start:  02/13/25    Expected End:  05/13/25                Nelly Smith, PT

## 2025-02-20 ENCOUNTER — CLINICAL SUPPORT (OUTPATIENT)
Dept: REHABILITATION | Facility: HOSPITAL | Age: 75
End: 2025-02-20
Payer: MEDICARE

## 2025-02-20 DIAGNOSIS — M25.512 ARTHRALGIA OF LEFT ACROMIOCLAVICULAR JOINT: Primary | ICD-10-CM

## 2025-02-20 DIAGNOSIS — M62.81 MUSCLE WEAKNESS: ICD-10-CM

## 2025-02-20 PROCEDURE — 97010 HOT OR COLD PACKS THERAPY: CPT

## 2025-02-20 PROCEDURE — 97530 THERAPEUTIC ACTIVITIES: CPT

## 2025-02-20 PROCEDURE — 97014 ELECTRIC STIMULATION THERAPY: CPT

## 2025-02-20 PROCEDURE — 97110 THERAPEUTIC EXERCISES: CPT

## 2025-02-20 PROCEDURE — 97112 NEUROMUSCULAR REEDUCATION: CPT

## 2025-02-20 PROCEDURE — 97140 MANUAL THERAPY 1/> REGIONS: CPT

## 2025-02-20 NOTE — PROGRESS NOTES
"  Outpatient Rehab    Physical Therapy Visit    Patient Name: Bereket Donato  MRN: [id number]  YOB: 1950  Today's Date: 2/20/2025    Therapy Diagnosis:   Encounter Diagnoses   Name Primary?    Arthralgia of left acromioclavicular joint Yes    Muscle weakness      Physician: Adolph Romero MD       Time In: 0725   Time Out: 0840  Total Time: 75   Total Billable Time: 75           Subjective   No new complaints. He does comment that "it is working" "the shoulder is feeling better".         Objective            Treatment:  Therapeutic Exercise  Therapeutic Exercise Activity 1: Shoulder ROM  Therapeutic Exercise Activity 2: scapular strengthening    Manual Therapy  Manual Therapy Activity 1: grade II mobs at GH joint AP and superior/inferior    Balance/Neuromuscular Re-Education  Balance/Neuromuscular Re-Education Activity 1: correct glenohumeral and scapulothoracic rhythm    Therapeutic Activity  Therapeutic Activity 1: reaching and functional UE use    Modalities  Moist Heat (min): 10  Cryotherapy (Minutes\Location): 10    Assessment & Plan   Assessment: Exercise focus on manual stretching and PROM followed by active movements and scapular strengthening at left shoulder.  Bicep involvement is obvious.  VC for correct movement patterns. Increased horizontal abduction during exercise and functional movement today.  Good participation and adjustments with VC.       Patient will continue to benefit from skilled outpatient physical therapy to address the deficits listed in the problem list box on initial evaluation, provide pt/family education and to maximize pt's level of independence in the home and community environment.     Patient's spiritual, cultural, and educational needs considered and patient agreeable to plan of care and goals.           Plan: Patient will continue to benefit from therapy for progressive AROM and strengthening at left shoulder and B scapular regions.    Goals:   Active       " Functional outcome       Patient will show a significant change in FOTO patient-reported outcome tool to demonstrate subjective improvement       Start:  02/13/25    Expected End:  05/13/25            Patient will demonstrate independence in home program for support of progression       Start:  02/13/25    Expected End:  05/13/25               Hand and arm use       Patient will report at least 50% overall perceived improvement        Start:  02/13/25    Expected End:  05/13/25               Pain       Patient will report a 2 point reduction in pain while sleeping at night       Start:  02/13/25    Expected End:  05/13/25               Range of Motion       Patient will achieve left shoulder internal rotation of 50 degrees in 45 degrees abd       Start:  02/13/25    Expected End:  05/13/25               Strength       Patient will achieve left shoulder strength of 4/5 in all planes in neutral       Start:  02/13/25    Expected End:  05/13/25                Nelly Smith, PT

## 2025-02-25 ENCOUNTER — CLINICAL SUPPORT (OUTPATIENT)
Dept: REHABILITATION | Facility: HOSPITAL | Age: 75
End: 2025-02-25
Payer: MEDICARE

## 2025-02-25 DIAGNOSIS — M25.512 ARTHRALGIA OF LEFT ACROMIOCLAVICULAR JOINT: Primary | ICD-10-CM

## 2025-02-25 PROCEDURE — 97140 MANUAL THERAPY 1/> REGIONS: CPT

## 2025-02-25 PROCEDURE — 97010 HOT OR COLD PACKS THERAPY: CPT

## 2025-02-25 PROCEDURE — 97014 ELECTRIC STIMULATION THERAPY: CPT

## 2025-02-25 PROCEDURE — 97110 THERAPEUTIC EXERCISES: CPT

## 2025-02-25 PROCEDURE — 97112 NEUROMUSCULAR REEDUCATION: CPT

## 2025-02-25 PROCEDURE — 97530 THERAPEUTIC ACTIVITIES: CPT

## 2025-02-25 NOTE — PROGRESS NOTES
Outpatient Rehab    Physical Therapy Visit    Patient Name: Bereket Donato  MRN: [id number]  YOB: 1950  Encounter Date: 2/25/2025    Therapy Diagnosis:   Encounter Diagnosis   Name Primary?    Arthralgia of left acromioclavicular joint Yes     Physician: Adolph Romero MD    Physician Orders: Eval and Treat  Medical Diagnosis: Left shoulder arthralgia    Visit # / Visits Authorized:  3 / medically necessary   Date of Evaluation:  2/13/2025  Insurance Authorization Period: 2/13/2025 to 5/13/2025  Plan of Care Certification:  2/13/2025 to 5/13/2025      Time In: 0728   Time Out: 0845  Total Time: 77   Total Billable Time: 77    FOTO:  Intake Score: 57 % on 2/13/25  Survey Score 1:  %  Survey Score 2:  %         Subjective   Patient reports he feels good when he leaves therapy and is able to use 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         Objective            Treatment:  Therapeutic Exercise  Therapeutic Exercise Activity 1: Shoulder ROM  Therapeutic Exercise Activity 2: scapular strengthening    Manual Therapy  Manual Therapy Activity 1: grade II mobs at GH joint AP and superior/inferior    Balance/Neuromuscular Re-Education  Balance/Neuromuscular Re-Education Activity 1: correct glenohumeral and scapulothoracic rhythm    Modalities  Moist Heat (min): 10  Cryotherapy (Minutes\Location): 10    Assessment & Plan   Assessment: Exercise focus on manual stretching and PROM followed by active movements and scapular strengthening at left shoulder.  Bicep involvement is obvious.  VC for correct movement patterns. Increased horizontal abduction during exercise and functional movement today.  Good participation and adjustments with VC.       Patient will continue to benefit from skilled outpatient physical therapy to address the deficits listed in the problem list box on initial evaluation, provide pt/family education and to maximize pt's level of independence in the home and  community environment.     Patient's spiritual, cultural, and educational needs considered and patient agreeable to plan of care and goals.           Plan: Patient will continue to benefit from therapy for progressive AROM and strengthening at left shoulder and B scapular regions.    Goals:   Active       Functional outcome       Patient will show a significant change in FOTO patient-reported outcome tool to demonstrate subjective improvement       Start:  02/13/25    Expected End:  05/13/25            Patient will demonstrate independence in home program for support of progression       Start:  02/13/25    Expected End:  05/13/25               Hand and arm use       Patient will report at least 50% overall perceived improvement        Start:  02/13/25    Expected End:  05/13/25               Pain       Patient will report a 2 point reduction in pain while sleeping at night       Start:  02/13/25    Expected End:  05/13/25               Range of Motion       Patient will achieve left shoulder internal rotation of 50 degrees in 45 degrees abd       Start:  02/13/25    Expected End:  05/13/25               Strength       Patient will achieve left shoulder strength of 4/5 in all planes in neutral       Start:  02/13/25    Expected End:  05/13/25                Nelly Smith, PT

## 2025-02-26 ENCOUNTER — TELEPHONE (OUTPATIENT)
Dept: FAMILY MEDICINE | Facility: CLINIC | Age: 75
End: 2025-02-26
Payer: MEDICARE

## 2025-02-26 DIAGNOSIS — M25.512 ARTHRALGIA OF LEFT ACROMIOCLAVICULAR JOINT: ICD-10-CM

## 2025-02-26 RX ORDER — MELOXICAM 15 MG/1
15 TABLET ORAL
Qty: 30 TABLET | Refills: 0 | Status: SHIPPED | OUTPATIENT
Start: 2025-02-26

## 2025-02-26 NOTE — TELEPHONE ENCOUNTER
Patient's wife called requesting if we had received anything from the [facility] since faxing over the order on 2/4/25. I had informed her when we sent the order we needed 6 months of glucose reading and they had required multiple occurences of low blood sugar below 50 before they would consider approving his prescription being he was not on insulin. I told her I can still send it but it was no guarantee it would cover. I attempted to call today to check status on it has we have not heard anything and the recording stated they are in a meeting until this afternoon.

## 2025-02-27 ENCOUNTER — PATIENT MESSAGE (OUTPATIENT)
Dept: FAMILY MEDICINE | Facility: CLINIC | Age: 75
End: 2025-02-27

## 2025-02-27 ENCOUNTER — CLINICAL SUPPORT (OUTPATIENT)
Dept: REHABILITATION | Facility: HOSPITAL | Age: 75
End: 2025-02-27
Payer: MEDICARE

## 2025-02-27 ENCOUNTER — RESULTS FOLLOW-UP (OUTPATIENT)
Dept: FAMILY MEDICINE | Facility: CLINIC | Age: 75
End: 2025-02-27

## 2025-02-27 ENCOUNTER — OFFICE VISIT (OUTPATIENT)
Dept: FAMILY MEDICINE | Facility: CLINIC | Age: 75
End: 2025-02-27
Payer: MEDICARE

## 2025-02-27 VITALS
BODY MASS INDEX: 27.82 KG/M2 | DIASTOLIC BLOOD PRESSURE: 69 MMHG | OXYGEN SATURATION: 100 % | RESPIRATION RATE: 20 BRPM | TEMPERATURE: 98 F | SYSTOLIC BLOOD PRESSURE: 124 MMHG | WEIGHT: 167 LBS | HEIGHT: 65 IN | HEART RATE: 102 BPM

## 2025-02-27 DIAGNOSIS — M25.512 ARTHRALGIA OF LEFT ACROMIOCLAVICULAR JOINT: Primary | ICD-10-CM

## 2025-02-27 DIAGNOSIS — E11.65 TYPE 2 DIABETES MELLITUS WITH HYPERGLYCEMIA, WITHOUT LONG-TERM CURRENT USE OF INSULIN: Chronic | ICD-10-CM

## 2025-02-27 DIAGNOSIS — R63.4 WEIGHT LOSS, NON-INTENTIONAL: Primary | ICD-10-CM

## 2025-02-27 DIAGNOSIS — R39.15 URINARY URGENCY: ICD-10-CM

## 2025-02-27 PROCEDURE — 99214 OFFICE O/P EST MOD 30 MIN: CPT | Mod: ,,,

## 2025-02-27 PROCEDURE — 97110 THERAPEUTIC EXERCISES: CPT

## 2025-02-27 PROCEDURE — 97014 ELECTRIC STIMULATION THERAPY: CPT

## 2025-02-27 PROCEDURE — 97010 HOT OR COLD PACKS THERAPY: CPT

## 2025-02-27 PROCEDURE — 97112 NEUROMUSCULAR REEDUCATION: CPT

## 2025-02-27 NOTE — PROGRESS NOTES
Patient ID: [id number]     Chief Complaint: Diabetes (Blood sugars are elevated) and weightloss (Unexplained weightloss)    HPI:     Scooter Donato is a 74 year old male who presents today with concerns about weight loss.    WEIGHT AND FUNCTIONAL STATUS:  He has experienced weight loss from 173 lbs in December. He reports difficulty lifting heavy objects, specifically unable to lift a 50-pound bag of feed.    DIABETES:  His last A1c in December was 6.0. He takes four diabetes medications, two in the morning and two at night. He received a cortisone injection approximately two weeks ago which has caused elevated blood sugar.    GENITOURINARY:  He reports urinary urgency and difficulty holding urine while driving when unable to void immediately.        Past Medical History:   Diagnosis Date    Allergic rhinitis     Anxiety disorder, unspecified     Asthma     Asthmatic bronchitis     Atherosclerosis of aorta     Chronic inflammatory arthritis     Claudication     Diabetic neuropathy     Dysphagia     Family history of aortic aneurysm     GERD (gastroesophageal reflux disease)     Heart murmur     HLD (hyperlipidemia)     HTN (hypertension)     Hypercalcemia     Hyperparathyroidism     Hypoparathyroidism, unspecified     Lower extremity edema     Male erectile dysfunction, unspecified     Multiple actinic keratoses     Nonrheumatic mitral (valve) insufficiency     Osteoarthritis     Personal history of colonic polyps 2017    Tubular adenoma 2017    Type 2 diabetes mellitus     Vitamin D deficiency         Past Surgical History:   Procedure Laterality Date    APPENDECTOMY      COLONOSCOPY  12/05/2022    Dr Himanshu Prajapati - [place], [place]    CYSTOSCOPY  2012    TONSILLECTOMY          Social History     Socioeconomic History    Marital status:    Tobacco Use    Smoking status: Never    Smokeless tobacco: Never   Substance and Sexual Activity    Alcohol use: Yes    Drug use: Never    Sexual activity: Not Currently      Partners: Female     Social Drivers of Health     Financial Resource Strain: Medium Risk (8/26/2024)    Overall Financial Resource Strain (CARDIA)     Difficulty of Paying Living Expenses: Somewhat hard   Food Insecurity: No Food Insecurity (8/7/2024)    Hunger Vital Sign     Worried About Running Out of Food in the Last Year: Never true     Ran Out of Food in the Last Year: Never true   Transportation Needs: No Transportation Needs (8/7/2024)    TRANSPORTATION NEEDS     Transportation : No   Physical Activity: Sufficiently Active (5/31/2023)    Exercise Vital Sign     Days of Exercise per Week: 6 days     Minutes of Exercise per Session: 30 min   Stress: No Stress Concern Present (5/31/2023)    [employer] - Occupational Stress Questionnaire     Feeling of Stress : Only a little   Housing Stability: Unknown (8/7/2024)    Housing Stability Vital Sign     Unable to Pay for Housing in the Last Year: No     Homeless in the Last Year: No        Current Outpatient Medications   Medication Instructions    albuterol (PROVENTIL/VENTOLIN HFA) 90 mcg/actuation inhaler INHALE 2 PUFFS BY MOUTH EVERY 4 - 6 HOURS AS NEEDED FOR WHEEZING    atorvastatin (LIPITOR) 10 mg, Oral, Nightly    blood sugar diagnostic Strp 1 each, Misc.(Non-Drug; Combo Route), Daily    blood-glucose meter kit Use as instructed    blood-glucose sensor (FREESTYLE MADDIE 3 SENSOR) Crystal 1 each, Misc.(Non-Drug; Combo Route), Every 14 days    BREO ELLIPTA 100-25 mcg/dose diskus inhaler 1 puff, Inhalation    ezetimibe (ZETIA) 10 mg, Oral, Nightly    flash glucose sensor (FREESTYLE MADDIE 14 DAY SENSOR) Kit 1 each, Misc.(Non-Drug; Combo Route), Every 14 days    fluticasone propionate (FLONASE) 50 mcg, Each Nostril, 2 times daily    lancets (LANCETS,THIN) Misc 1 each, Misc.(Non-Drug; Combo Route), Daily    meloxicam (MOBIC) 15 mg, Oral    metFORMIN (GLUCOPHAGE-XR) 1,000 mg, Oral, 2 times daily with meals    omeprazole (PRILOSEC) 20 mg,  "Oral, Every morning    pyrilamine-chlophedianoL 12.5-12.5 mg/5 mL Liqd 10 mLs, Oral, 3 times daily    valsartan (DIOVAN) 320 mg, Oral, Daily       Review of patient's allergies indicates:  No Known Allergies     Patient Care Team:  John Ford DO as PCP - General (Family Medicine)  [facility] -  Ulysses Hernandez MD as Consulting Provider (Rheumatology)  Lizz Britt as Consulting Provider (Internal Medicine)  Bam Sagastume MD as Consulting Provider (Dermatology)  Letha De Anda MD as Consulting Provider (Cardiology)  [facility]  Max Gonzalez MD as Resident (Cardiology)  [facility] (Optometry)     Subjective:     Review of Systems    12 point review of systems conducted, negative except as stated in the history of present illness. See HPI for details.    Objective:     Visit Vitals  /69 (BP Location: Right arm, Patient Position: Sitting)   Pulse 102   Temp 97.9 °F (36.6 °C)   Resp 20   Ht 5' 5" (1.651 m)   Wt 75.8 kg (167 lb)   SpO2 100%   BMI 27.79 kg/m²       Physical Exam    Physical Exam    General: No acute distress. Well-developed. Well-nourished.  Eyes: EOMI. Sclerae anicteric.  HENT: Normocephalic. Atraumatic. Nares patent. Moist oral mucosa.  Ears: Bilateral TMs clear. Bilateral EACs clear.  Cardiovascular: Regular rate. Regular rhythm. No murmurs. No rubs. No gallops. Normal S1, S2.  Respiratory: Normal respiratory effort. Clear to auscultation bilaterally. No rales. No rhonchi. No wheezing.  Abdomen: Soft. Non-tender. Non-distended. Normoactive bowel sounds.  Musculoskeletal: No  obvious deformity.  Extremities: No lower extremity edema.  Neurological: Alert & oriented x3. No slurred speech. Normal gait.  Psychiatric: Normal mood. Normal affect. Good insight. Good judgment.  Skin: Warm. Dry. No rash.           Labs Reviewed:     Chemistry:  Lab Results   Component Value Date     02/27/2025    K 4.5 02/27/2025    " BUN 26.0 (H) 02/27/2025    CREATININE 0.86 02/27/2025    EGFRNORACEVR >60 02/27/2025    GLUCOSE 113 02/27/2025    CALCIUM 9.6 02/27/2025    ALKPHOS 66 02/27/2025    LABPROT 7.4 02/27/2025    ALBUMIN 4.0 02/27/2025    AST 19 02/27/2025    ALT 24 02/27/2025    PHOS 3.3 08/16/2022    AIQUAVNT67JA 54.9 11/30/2023    TSH 0.960 02/27/2025    [id number] 1.06 08/16/2022    PSA 2.66 12/02/2024        Lab Results   Component Value Date    HGBA1C 6.1 02/27/2025        Hematology:  Lab Results   Component Value Date    WBC 6.82 02/27/2025    HGB 14.3 02/27/2025    HCT 43.0 02/27/2025     02/27/2025       Lipid Panel:  Lab Results   Component Value Date    CHOL 87 12/02/2024    HDL 36 12/02/2024    LDL 39.00 (L) 12/02/2024    TRIG 62 12/02/2024    TOTALCHOLEST 2 12/02/2024        Urine:  Lab Results   Component Value Date    CREATRANDUR 60.6 (L) 03/07/2024      Assessment:       ICD-10-CM ICD-9-CM   1. Weight loss, non-intentional  R63.4 783.21   2. Type 2 diabetes mellitus with hyperglycemia, without long-term current use of insulin  E11.65 250.00     790.29   3. Urinary urgency  R39.15 788.63        Plan:      Weight loss, non-intentional  Assessment & Plan:  Medical records show a decrease from 173 lbs in December to 167 lbs today.    Orders:  -     CBC Auto Differential; Future; Expected date: 02/27/2025  -     Comprehensive Metabolic Panel; Future; Expected date: 02/27/2025  -     Hemoglobin A1C; Future; Expected date: 02/27/2025  -     TSH; Future; Expected date: 02/27/2025    Type 2 diabetes mellitus with hyperglycemia, without long-term current use of insulin  Assessment & Plan:  Explained the normal physiological response of glucose rising after meals and insulin's role in lowering it.  Discussed the expected temporary elevation of blood sugar following cortisone injection.    Reviewed patient's average glucose of 131 from February 21st to 27th.  Observed GMI (Glucose Management Indicator) of 6.4 for the last 7  days, indicating an estimated A1c of 6.4.  Noted last A1c in December was 6.0.  Measured current blood sugar at 120.  Reviewed blood sugar patterns showing elevation around 150 at 6 AM, then decreasing, rising to 150 at noon, and slightly below 150 around supper time.  Confirmed patient's blood sugar is staying within the target range of 70 to 180.      Planned to recheck A1c, kidney function, liver function, electrolytes, CBC, and thyroid level due to unexplained weight loss and concerns about blood sugar control.    Orders:  -     CBC Auto Differential; Future; Expected date: 02/27/2025  -     Comprehensive Metabolic Panel; Future; Expected date: 02/27/2025  -     Hemoglobin A1C; Future; Expected date: 02/27/2025  -     TSH; Future; Expected date: 02/27/2025    Urinary urgency  Assessment & Plan:  Noted patient reports difficulty holding urine, especially while driving.  Planned to monitor this symptom and consider further evaluation if it persists or worsens.        Follow up if symptoms worsen or fail to improve. In addition to their scheduled follow up, the patient has also been instructed to follow up on as needed basis.     This note was generated with the assistance of ambient listening technology. Verbal consent was obtained by the patient and accompanying visitor(s) for the recording of patient appointment to facilitate this note. I attest to having reviewed and edited the generated note for accuracy, though some syntax or spelling errors may persist. Please contact the author of this note for any clarification.      Lo Qureshi, Adult-Gerontology NP

## 2025-02-27 NOTE — ASSESSMENT & PLAN NOTE
Explained the normal physiological response of glucose rising after meals and insulin's role in lowering it.  Discussed the expected temporary elevation of blood sugar following cortisone injection.    Reviewed patient's average glucose of 131 from February 21st to 27th.  Observed GMI (Glucose Management Indicator) of 6.4 for the last 7 days, indicating an estimated A1c of 6.4.  Noted last A1c in December was 6.0.  Measured current blood sugar at 120.  Reviewed blood sugar patterns showing elevation around 150 at 6 AM, then decreasing, rising to 150 at noon, and slightly below 150 around supper time.  Confirmed patient's blood sugar is staying within the target range of 70 to 180.      Planned to recheck A1c, kidney function, liver function, electrolytes, CBC, and thyroid level due to unexplained weight loss and concerns about blood sugar control.

## 2025-02-27 NOTE — PROGRESS NOTES
Outpatient Rehab    Physical Therapy Visit    Patient Name: Bereket Donato  MRN: [id number]  YOB: 1950  Encounter Date: 2/27/2025    Therapy Diagnosis:   Encounter Diagnosis   Name Primary?    Arthralgia of left acromioclavicular joint Yes     Physician: Adolph Romero MD    Physician Orders: Eval and Treat  Medical Diagnosis: Left shoulder arthralgia    Visit # / Visits Authorized:  4 / medically necessary   Date of Evaluation:  2/13/2025  Insurance Authorization Period: 2/13/2025 to 5/13/2025  Plan of Care Certification:  2/13/2025 to 5/13/2025      Time In: 0727   Time Out: 0820  Total Time: 53   Total Billable Time: 53    FOTO:  Intake Score: 57 % on 2/13/25  Survey Score 1:  %  Survey Score 2:  %         Subjective   Patient reports his shoulders are feeling much better. 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 He says it still hurts to sleep on his shoulder..         Objective            Treatment:  Therapeutic Exercise  Therapeutic Exercise Activity 1: Shoulder ROM  Therapeutic Exercise Activity 2: scapular strengthening    Manual Therapy  Manual Therapy Activity 1: grade II mobs at GH joint AP and superior/inferior    Balance/Neuromuscular Re-Education  Balance/Neuromuscular Re-Education Activity 1: correct glenohumeral and scapulothoracic rhythm    Modalities  Moist Heat (min): 10  Cryotherapy (Minutes\Location): 10    Assessment & Plan   Assessment: Exercise focus on manual stretching and PROM followed by active movements and scapular strengthening at left shoulder.  Bicep and RC involvement is obvious.  VC for correct movement patterns. PROM with increased resistance and slight anteriorly dislocated humeral head this morning. Patient agreees. Continued horizontal abduction during exercise and functional movement today.  Good participation and adjustments with VC. Time limited due to MD appt this morning.       Patient will continue to benefit from skilled  outpatient physical therapy to address the deficits listed in the problem list box on initial evaluation, provide pt/family education and to maximize pt's level of independence in the home and community environment.     Patient's spiritual, cultural, and educational needs considered and patient agreeable to plan of care and goals.           Plan: Patient will continue to benefit from therapy for progressive AROM and strengthening at left shoulder and B scapular regions.    Goals:   Active       Functional outcome       Patient will show a significant change in FOTO patient-reported outcome tool to demonstrate subjective improvement       Start:  02/13/25    Expected End:  05/13/25            Patient will demonstrate independence in home program for support of progression       Start:  02/13/25    Expected End:  05/13/25               Hand and arm use       Patient will report at least 50% overall perceived improvement        Start:  02/13/25    Expected End:  05/13/25               Pain       Patient will report a 2 point reduction in pain while sleeping at night       Start:  02/13/25    Expected End:  05/13/25               Range of Motion       Patient will achieve left shoulder internal rotation of 50 degrees in 45 degrees abd       Start:  02/13/25    Expected End:  05/13/25               Strength       Patient will achieve left shoulder strength of 4/5 in all planes in neutral       Start:  02/13/25    Expected End:  05/13/25                Nelly Smith, PT

## 2025-02-27 NOTE — ASSESSMENT & PLAN NOTE
Noted patient reports difficulty holding urine, especially while driving.  Planned to monitor this symptom and consider further evaluation if it persists or worsens.

## 2025-03-05 ENCOUNTER — TELEPHONE (OUTPATIENT)
Dept: FAMILY MEDICINE | Facility: CLINIC | Age: 75
End: 2025-03-05
Payer: MEDICARE

## 2025-03-05 NOTE — TELEPHONE ENCOUNTER
Job KURTIS. with [employer] called requesting additional medical records for patient as he does not have enough information to support the patient receiving a freestyle gerard for hypoglycemic episodes. I told him I send everything I had on 2/28/25 which he had received.

## 2025-03-06 ENCOUNTER — CLINICAL SUPPORT (OUTPATIENT)
Dept: REHABILITATION | Facility: HOSPITAL | Age: 75
End: 2025-03-06
Payer: MEDICARE

## 2025-03-06 DIAGNOSIS — M25.512 ARTHRALGIA OF LEFT ACROMIOCLAVICULAR JOINT: Primary | ICD-10-CM

## 2025-03-06 PROCEDURE — 97112 NEUROMUSCULAR REEDUCATION: CPT

## 2025-03-06 PROCEDURE — 97110 THERAPEUTIC EXERCISES: CPT

## 2025-03-06 PROCEDURE — 97530 THERAPEUTIC ACTIVITIES: CPT

## 2025-03-06 PROCEDURE — 97140 MANUAL THERAPY 1/> REGIONS: CPT

## 2025-03-06 PROCEDURE — 97010 HOT OR COLD PACKS THERAPY: CPT

## 2025-03-06 NOTE — PROGRESS NOTES
Outpatient Rehab    Physical Therapy Visit    Patient Name: Bereket Donato  MRN: [id number]  YOB: 1950  Encounter Date: 3/6/2025    Therapy Diagnosis:   Encounter Diagnosis   Name Primary?    Arthralgia of left acromioclavicular joint Yes     Physician: Adolph Romero MD    Physician Orders: Eval and Treat  Medical Diagnosis: Left shoulder arthralgia    Visit # / Visits Authorized:  5 / medically necessary   Date of Evaluation:  2/13/2025  Insurance Authorization Period: 2/13/2025 to 5/13/2025  Plan of Care Certification:  2/13/2025 to 5/13/2025      Time In: 0727   Time Out: 0840  Total Time: 73   Total Billable Time: 53    FOTO:  Intake Score: 57 % on 2/13/25  Survey Score 1:  %  Survey Score 2:  %         Subjective   Patient reports shoulders feeling much better.  He does still have difficulty with pain at night..         Objective            Treatment:  Therapeutic Exercise  Therapeutic Exercise Activity 1: Shoulder ROM  Therapeutic Exercise Activity 2: scapular strengthening    Manual Therapy  Manual Therapy Activity 1: grade II mobs at GH joint AP and superior/inferior    Balance/Neuromuscular Re-Education  Balance/Neuromuscular Re-Education Activity 1: correct glenohumeral and scapulothoracic rhythm    Modalities  Moist Heat (min): 10  Cryotherapy (Minutes\Location): 10    Assessment & Plan   Assessment: Exercise focus on manual stretching and PROM followed by active movements and scapular strengthening at left shoulder.  Bicep and RC involvement is obvious.  VC for correct movement patterns. Advnced self stretch into ER and IR with reports of relief.  Encouraged stretching prior to sleep. patient in agreement. Continued horizontal abduction during exercise and functional movement today.  Good participation and adjustments with VC. Good tolerance. Great session.       Patient will continue to benefit from skilled outpatient physical therapy to address the deficits listed in the problem  list box on initial evaluation, provide pt/family education and to maximize pt's level of independence in the home and community environment.     Patient's spiritual, cultural, and educational needs considered and patient agreeable to plan of care and goals.           Plan: Patient will continue to benefit from therapy for progressive AROM and strengthening at left shoulder and B scapular regions.    Goals:   Active       Functional outcome       Patient will show a significant change in FOTO patient-reported outcome tool to demonstrate subjective improvement       Start:  02/13/25    Expected End:  05/13/25            Patient will demonstrate independence in home program for support of progression       Start:  02/13/25    Expected End:  05/13/25               Hand and arm use       Patient will report at least 50% overall perceived improvement        Start:  02/13/25    Expected End:  05/13/25               Pain       Patient will report a 2 point reduction in pain while sleeping at night       Start:  02/13/25    Expected End:  05/13/25               Range of Motion       Patient will achieve left shoulder internal rotation of 50 degrees in 45 degrees abd       Start:  02/13/25    Expected End:  05/13/25               Strength       Patient will achieve left shoulder strength of 4/5 in all planes in neutral       Start:  02/13/25    Expected End:  05/13/25                Nelly Smith, PT

## 2025-03-11 ENCOUNTER — CLINICAL SUPPORT (OUTPATIENT)
Dept: REHABILITATION | Facility: HOSPITAL | Age: 75
End: 2025-03-11
Payer: MEDICARE

## 2025-03-11 DIAGNOSIS — M25.512 ARTHRALGIA OF LEFT ACROMIOCLAVICULAR JOINT: Primary | ICD-10-CM

## 2025-03-11 PROCEDURE — 97112 NEUROMUSCULAR REEDUCATION: CPT

## 2025-03-11 PROCEDURE — 97140 MANUAL THERAPY 1/> REGIONS: CPT

## 2025-03-11 PROCEDURE — 97110 THERAPEUTIC EXERCISES: CPT

## 2025-03-11 PROCEDURE — 97010 HOT OR COLD PACKS THERAPY: CPT

## 2025-03-11 PROCEDURE — 97014 ELECTRIC STIMULATION THERAPY: CPT

## 2025-03-11 PROCEDURE — 97530 THERAPEUTIC ACTIVITIES: CPT

## 2025-03-11 NOTE — PROGRESS NOTES
Outpatient Rehab    Physical Therapy Discharge    Patient Name: Bereket Donato  MRN: [id number]  YOB: 1950  Encounter Date: 3/11/2025    Therapy Diagnosis:   Encounter Diagnosis   Name Primary?    Arthralgia of left acromioclavicular joint Yes     Physician: Adolph Romero MD    Physician Orders: Eval and Treat  Medical Diagnosis: L shoulder arthralgia    Visit # / Visits Authorized:  6 / medically necessary   Date of Evaluation: 2/13/2025  Insurance Authorization Period: 2/13/2025 to medically necessary  Plan of Care Certification:  2/13/2025 to 5/13/2025      Time In: 0730   Time Out: 0848  Total Time: 78   Total Billable Time: 78    FOTO:  Intake Score:  57% on 2/13/25  Survey Score 1:  91% on 3/11/25  Survey Score 2:  %         Subjective   Patient reports feeling 100% better and is ready to discharge.         Objective      Subcranial Range of Motion   Active Restricted? Passive Restricted? Pain   Flexion         Protraction         Retraction           Cervical Range of Motion   Active (deg) Passive (deg) Pain   Flexion 20       Extension 35       Right Lateral Flexion 20       Right Rotation         Left Lateral Flexion 25       Left Rotation                Shoulder Range of Motion  Left Shoulder   Active (deg) Passive (deg) Pain   Flexion 145       Extension         Scaption 155       ABduction 155       ADduction         Horizontal ABduction         Horizontal ADduction         External Rotation (Shoulder ABducted 0 degrees)         External Rotation (Shoulder ABducted 45 degrees) 70       External Rotation (Shoulder ABducted 90 degrees)         Internal Rotation (Shoulder ABducted 0 degrees)         Internal Rotation (Shoulder ABducted 45 degrees) 50       Internal Rotation (Shoulder ABducted 90 degrees)                  Elbow/Forearm Range of Motion   Left Elbow/Forearm   Active (deg) Passive (deg) Pain   Flexion         Extension -16       Forearm Pronation         Forearm Supination                        Shoulder Strength - Planes of Motion   Right Strength Right Pain Left Strength Left  Pain   Flexion     4     Extension     4     ABduction     3     ADduction     3     Horizontal ABduction     4-     Horizontal ADduction     3+     Internal Rotation 0°           Internal Rotation 90°     3     External Rotation 0°           External Rotation 90°     3         Shoulder Strength - Rotator Cuff Muscles   Right Strength Right Pain Left Strength Left  Pain   Supraspinatus     3     Infraspinatus     3     Teres Minor           Subscapularis     3                 Shoulder Joint Mobility            Apley's functional reach test: R ER to T2, L ER to C8, R IR to T10, L IR to T10           Treatment:  Therapeutic Exercise  Therapeutic Exercise Activity 1: Shoulder ROM  Therapeutic Exercise Activity 2: scapular strengthening    Manual Therapy  Manual Therapy Activity 1: grade II mobs at GH joint AP and superior/inferior    Balance/Neuromuscular Re-Education  Balance/Neuromuscular Re-Education Activity 1: correct glenohumeral and scapulothoracic rhythm    Therapeutic Activity  Therapeutic Activity 1: reaching and functional UE use    Modalities  Moist Heat (min): 10  Electrical Stimulation (Parameters): 15    Assessment & Plan   Assessment: Patient has completed 6 visits of therapy with focus on left shoulder ROM and scapular strengthening with good response. He is reporting less pain and demonstrating improved strength and AROM at left shoulder in all planes. He is able to tolerate functional use for crawfishing, riding horse and roping. He is reporting 100% overall perceived improvement and requesting discharge today. Issued written HEP to maintain gains made in therapy.       Patient's spiritual, cultural, and educational needs considered and patient agreeable to plan of care and goals.           Plan: Discharge physical therapy. All goals met.    Goals:   Active       Functional outcome       Patient  will show a significant change in FOTO patient-reported outcome tool to demonstrate subjective improvement (Met)       Start:  02/13/25    Expected End:  05/13/25    Resolved:  03/11/25         Patient will demonstrate independence in home program for support of progression       Start:  02/13/25    Expected End:  05/13/25               Strength       Patient will achieve left shoulder strength of 4/5 in all planes in neutral (Met)       Start:  02/13/25    Expected End:  05/13/25    Resolved:  03/11/25           Resolved       Hand and arm use       Patient will report at least 50% overall perceived improvement  (Met)       Start:  02/13/25    Expected End:  05/13/25    Resolved:  03/11/25            Pain       Patient will report a 2 point reduction in pain while sleeping at night (Met)       Start:  02/13/25    Expected End:  05/13/25    Resolved:  03/11/25            Range of Motion       Patient will achieve left shoulder internal rotation of 50 degrees in 45 degrees abd (Met)       Start:  02/13/25    Expected End:  05/13/25    Resolved:  03/11/25             Nelly Smith, PT

## 2025-03-26 DIAGNOSIS — J31.0 RHINITIS, UNSPECIFIED TYPE: Primary | ICD-10-CM

## 2025-03-26 NOTE — TELEPHONE ENCOUNTER
Patient is requesting this medication is was not on his medication list but states he has some at home that needs to be refilled. Requesting 90 days supply

## 2025-03-27 DIAGNOSIS — E11.65 TYPE 2 DIABETES MELLITUS WITH HYPERGLYCEMIA, WITHOUT LONG-TERM CURRENT USE OF INSULIN: Chronic | ICD-10-CM

## 2025-03-27 RX ORDER — SEMAGLUTIDE 0.68 MG/ML
0.5 INJECTION, SOLUTION SUBCUTANEOUS
Qty: 9 ML | Refills: 1 | Status: SHIPPED | OUTPATIENT
Start: 2025-03-27 | End: 2025-09-23

## 2025-03-28 RX ORDER — MOMETASONE FUROATE MONOHYDRATE 50 UG/1
2 SPRAY, METERED NASAL DAILY
Refills: 0 | OUTPATIENT
Start: 2025-03-28

## 2025-03-30 DIAGNOSIS — M25.512 ARTHRALGIA OF LEFT ACROMIOCLAVICULAR JOINT: ICD-10-CM

## 2025-03-31 RX ORDER — MELOXICAM 15 MG/1
15 TABLET ORAL
Qty: 30 TABLET | Refills: 0 | Status: SHIPPED | OUTPATIENT
Start: 2025-03-31

## 2025-04-07 ENCOUNTER — CLINICAL SUPPORT (OUTPATIENT)
Dept: DIABETES | Facility: CLINIC | Age: 75
End: 2025-04-07
Payer: MEDICARE

## 2025-04-07 DIAGNOSIS — E11.65 TYPE 2 DIABETES MELLITUS WITH HYPERGLYCEMIA, WITHOUT LONG-TERM CURRENT USE OF INSULIN: Primary | Chronic | ICD-10-CM

## 2025-04-07 PROCEDURE — G0108 DIAB MANAGE TRN  PER INDIV: HCPCS | Mod: PBBFAC,PN | Performed by: DIETITIAN, REGISTERED

## 2025-04-07 NOTE — PROGRESS NOTES
Diabetes Care Specialist Progress Note  Author: Jami Gallo RD, [facility]  Date: 4/7/2025    Intake    Program Intake  Reason for Diabetes Program Visit:: Intervention  Type of Intervention:: Individual  Individual: Education  Education: Self-Management Skill Review  In the last month, have you used the ER or been admitted to the hospital: No    Current Diabetes Treatment: Diet/Exercise  Oral Medication Type/Dose: metformin 1000mg BID  DM Injectables Type/Dose: Ozempic 1 mg    Continuous Glucose Monitoring  Patient has CGM: Yes  Personal CGM type:: freestyle gerard 3 with phone stephen (unable to connect on [employer])  GMI Date: 04/07/25  GMI Value: 6.2 %    Lab Results   Component Value Date    HGBA1C 6.1 02/27/2025       Weight: (P) 76.6 kg (168 lb 14.4 oz)       Body mass index is 28.11 kg/m² (pended).    Lifestyle Coping Support & Clinical              Diabetes Self-Management Skills Assessment    Medication Skills Assessment  Patient is able to identify current diabetes medications, dosages, and appropriate timing of medications.: yes  Patient reports problems or concerns with current medication regimen.: yes  Medication regimen problems/concerns:: concerned about side effects  Patient is  aware that some diabetes medications can cause low blood sugar?: Yes  Medication Skills Assessment Completed:: Yes  Assessment indicates:: Instruction Needed  Area of need?: Yes                   Home Blood Glucose Monitoring  Patient states that blood sugar is checked at home daily.: yes  Monitoring Method:: personal continuous glucose monitor  Personal CGM type:: freestyle gerard 3 with phone stephen (unable to connect on Groove)   What is your current Time in Range?: 100%  Home Blood Glucose Monitoring Skills Assessment Completed: : Yes  Assessment indicates:: Instruction Needed  Area of need?: Yes    Acute Complications  Have you ever had hypoglycemia (low BG 70 or less)?: yes  How often and what are your symptoms?: sweating,  weak  Have you ever had hyperglycemia (high  or more)?: no  Have you ever had DKA?: no  Acute Complications Skills Assessment Completed: : Yes  Assessment indicates:: Instruction Needed  Area of need?: Yes    Chronic Complications  Chronic Complications Skills Assessment Completed: : No  Deferred due to:: Time  Area of need?: Deferred      Assessment Summary and Plan    Based on today's diabetes care assessment, the following areas of need were identified:      Identified Areas of Need      Medication/Current Diabetes Treatment: Yes pt would like to decrease Metformin. Instructed to consult with his PCP. States feels weak and unsteady when waking in morning.   Lifestyle Coping/Support:     Diabetes Disease Process/Treatment Options:     Nutrition/Healthy Eating:      Physical Activity/Exercise:      Home Blood Glucose Monitoring: Yes - see care plan   Acute Complications: Yes - reviewed treating hypoglycemia. Encouraged to discuss glucose and A1C goals with PCP.   Chronic Complications: Deferred       Today's interventions were provided through individual discussion, instruction, and written materials were provided.      Patient verbalized understanding of instruction and written materials.  Pt was able to return back demonstration of instructions today. Patient understood key points, needs reinforcement and further instruction.     Diabetes Self-Management Care Plan:    Today's Diabetes Self-Management Care Plan was developed with Scooter's input. Scooter has agreed to work toward the following goal(s) to improve his/her overall diabetes control.      Care Plan: Diabetes Management   Updates made since 4/7/2024 12:00 AM        Problem: Blood Glucose Self-Monitoring Resolved 2/3/2025        Goal: Patient agrees to use PCP-provided CGM sample to monitor blood sugar from 10-14 days. Completed 2/3/2025   Start Date: 8/7/2024   Expected End Date: 3/17/2025   This Visit's Progress: Met   Recent Progress: On track    Priority: High   Barriers: No Barriers Identified   Note:    8/7/24 - Provided information for stephen download. Pt states his daughter will help him with starting the CGM sample and will reach out to DM ed for assistance, as needed. Pt and wife agree to return in 2-3 weeks for AGP review.    8/26/24 - pt currently using sample of Freestyle Kristie 3 with phone stephen. See media for full Libreview report. Insurance denied request for Freestyle Kristie 14 sent to pharmacy. Instructed that it may be due to non-insulin dependent or needing to go to [facility]. Pt will will reach out to insurance for verification. Informed that the over-the-counter Dexcom Stelo will be available soon without prescription, if interested.     2/3/25 - pt requesting libreview records indicating hypoglycemic events. Noted one low on 12/8/24 and several days in October 20, 21, 25, 26 and 27. Libreview reports in . Lows ranging 43-69. Currently using Freestyle Kristie 3 with phone stephen. Unsure if insurance will pay. Offered information regarding Dexcom Stelo availability without prescription for self-pay.       Task: Reviewed the importance of self-monitoring blood glucose and keeping logs. Completed 8/7/2024        Problem: Medications Resolved 2/3/2025        Goal: Patient Agrees to take Diabetes Medication(s) Ozempic .5 and Metformin 500 mg ER BID as prescribed. Completed 2/3/2025   Start Date: 8/7/2024   Expected End Date: 8/26/2024   This Visit's Progress: Met   Priority: High   Barriers: No Barriers Identified   Note:    8/7/24 - reviewed including weekly strength training to manage side effects of wt loss and muscle loss. Pt states he does weights and stationary bike at home. Will refer to Pharmacy Assistance Program due to high out-of-pocket costs related to Medicare Taylor Dietrich.    8/26/24 - printed information needed to complete pharmacy assistance application and provided contact phone #. Pt states that he decreased Ozempic to  .25mg due to fear of wt loss and noticed and increase of GMI so will go back to .5 mg. Noted that his wt has been stable at 197#       Task: Reviewed with patient all current diabetes medications and provided basic review of the purpose, dosage, frequency, side effects, and storage of both oral and injectable diabetes medications. Completed 8/7/2024        Task: Reviewed possible resources for acquiring cost prohibitive medication. Completed 8/7/2024        Problem: Acute Complications Resolved 4/7/2025        Goal: Patient agrees to identify and manage signs and symptoms of high/low blood sugar (hyper/hypoglycemia) by keeping a log of events and using proper treatment. Completed 4/7/2025   Start Date: 2/3/2025   Expected End Date: 3/24/2025   This Visit's Progress: Met   Priority: High        Task: Reviewed proper treatment of hypoglycemia with the rule of 15--patient to eat 15g simple carbohydrate (4 glucose tablets, 1 glucose gel, 5 pieces hard candy, ½ cup fruit juice, ½ can regular soda, etc) and wait 15 minutes and recheck home glucose. Completed 2/3/2025        Task: Reviewed common causes and precautions to help prevent hyper/hypoglycemic events. Completed 2/3/2025        Task: Reviewed signs and symptoms of hyper/hypoglycemia, what range is considered to be hyper/hypoglycemia, and when to seek further medical attention. Completed 2/3/2025        Problem: Blood Glucose Self-Monitoring         Goal: Pt will contact pharmacy for out-of-pocket costs of freestyle Kristie 3 and use $75 copay option if available for cashpay option    Start Date: 4/7/2025   Expected End Date: 4/30/2025   Priority: Medium   Barriers: No Barriers Identified   Note:    Provided $75 copay assistance voucher and reviewed option of Freestyle [place] for $89/mth available without prescription. Also encouraged pt to discuss monitoring recs with PCP if unable to afford cashpay. Likely, medicare would not cover since not on insulin and not  having critical lows <54 when reviewing history on Freestyle Kristie 3 phone stephen (unable to connect on "[facility]"w - shows connected to clinic but unable to see reports).         Follow Up Plan     Follow up if symptoms worsen or fail to improve.    Today's care plan and follow up schedule was discussed with patient.  Scooter verbalized understanding of the care plan, goals, and agrees to follow up plan.        The patient was encouraged to communicate with his/her health care provider/physician and care team regarding his/her condition(s) and treatment.  I provided the patient with my contact information today and encouraged to contact me via phone or [facility] Patient Portal as needed.     Length of Visit   Total Time: 30 Minutes

## 2025-04-09 ENCOUNTER — TELEPHONE (OUTPATIENT)
Dept: FAMILY MEDICINE | Facility: CLINIC | Age: 75
End: 2025-04-09
Payer: MEDICARE

## 2025-04-15 ENCOUNTER — TELEPHONE (OUTPATIENT)
Dept: FAMILY MEDICINE | Facility: CLINIC | Age: 75
End: 2025-04-15
Payer: MEDICARE

## 2025-04-15 NOTE — TELEPHONE ENCOUNTER
----- Message from Lela sent at 4/15/2025  9:35 AM CDT -----  Regarding: refill med  Who Called: Scooter DonatoRefill or New Rx:RefillRX Name and Strength:  flash glucose sensor (FREESTYLE MADDIE 14 DAY SENSOR) KitHow is the patient currently taking it? (ex. 1XDay):Is this a 30 day or 90 day RX:Local or Mail Order:List of preferred pharmacies on file (remove unneeded): [unfilled]If different Pharmacy is requested, enter Pharmacy information here including location and phone number:  Ordering Provider:Preferred Method of Contact: Phone CallPatient's Preferred Phone Number on File: [phone] Best Call Back Number, if different:Additional Information: Please call pt because he was using something different from his son in law so he can describe it to you.

## 2025-04-16 ENCOUNTER — TELEPHONE (OUTPATIENT)
Dept: FAMILY MEDICINE | Facility: CLINIC | Age: 75
End: 2025-04-16
Payer: MEDICARE

## 2025-04-16 DIAGNOSIS — R05.1 ACUTE COUGH: Primary | ICD-10-CM

## 2025-04-16 RX ORDER — BENZONATATE 200 MG/1
200 CAPSULE ORAL 3 TIMES DAILY PRN
Qty: 30 CAPSULE | Refills: 0 | Status: SHIPPED | OUTPATIENT
Start: 2025-04-16 | End: 2025-04-26

## 2025-04-16 NOTE — TELEPHONE ENCOUNTER
Patient's wife called requesting cough medication and a refill on lomotil for diarrhea. I told her we can send in a new script for epilon pearls but we usually do not treat diarrhea if its just a few days and he will need an appt to be seen for this. She verbalized understanding.

## 2025-04-16 NOTE — TELEPHONE ENCOUNTER
----- Message from Zoltan sent at 4/16/2025  1:11 PM CDT -----  .Who Called: Scooter DonatoPatient is returning phone callWho Left Message for Patient: Michael the patient know what this is regarding?: Freestyle Kristie 14 Day SensorPreferred Method of Contact: Phone CallPatient's Preferred Phone Number on File: [phone] Best Call Back Number, if different:Additional Information: Pt is returning missed call and would also like to f/u on cough medication he says was supposed to be sent into [id number] - [place], [place] - [address] yesterday. Pt says he's still experiencing the cough and really needs something to help. Please advise, thank you.

## 2025-05-02 DIAGNOSIS — K21.9 GASTROESOPHAGEAL REFLUX DISEASE, UNSPECIFIED WHETHER ESOPHAGITIS PRESENT: ICD-10-CM

## 2025-05-02 LAB
LEFT EYE DM RETINOPATHY: NEGATIVE
RIGHT EYE DM RETINOPATHY: NEGATIVE

## 2025-05-02 RX ORDER — OMEPRAZOLE 20 MG/1
20 CAPSULE, DELAYED RELEASE ORAL EVERY MORNING
Qty: 90 CAPSULE | Refills: 1 | Status: SHIPPED | OUTPATIENT
Start: 2025-05-02

## 2025-05-06 ENCOUNTER — OFFICE VISIT (OUTPATIENT)
Dept: ORTHOPEDICS | Facility: CLINIC | Age: 75
End: 2025-05-06
Payer: MEDICARE

## 2025-05-06 VITALS
BODY MASS INDEX: 28.14 KG/M2 | SYSTOLIC BLOOD PRESSURE: 132 MMHG | HEART RATE: 79 BPM | DIASTOLIC BLOOD PRESSURE: 79 MMHG | HEIGHT: 65 IN | WEIGHT: 168.88 LBS

## 2025-05-06 DIAGNOSIS — M25.512 ARTHRALGIA OF LEFT ACROMIOCLAVICULAR JOINT: Primary | ICD-10-CM

## 2025-05-06 PROCEDURE — 99213 OFFICE O/P EST LOW 20 MIN: CPT | Mod: ,,, | Performed by: REHABILITATION UNIT

## 2025-05-06 NOTE — PROGRESS NOTES
Subjective:      Patient ID: Scooter Donato is a 74 y.o. male.    Chief Complaint: Follow-up (Lt shoulder last injection given 2/4/25- Stated last injection helped out great. Denies any pain or numbness. )    HPI:   Scooter Donato is a 74 y.o. male who presents today for follow up evaluation of his LUE.    History of Present Illness    CHIEF COMPLAINT:  - left shoulder follow up    HPI:  Scooter presents for follow-up after receiving therapy and an injection. He reports improvement, stating his condition is feeling good. He describes some residual discomfort but characterizes it as manageable. He denies needing another injection at this time. When asked about his shoulder, he indicates it is not causing significant issues.    He mentions difficulties with a new adjustable bed, describing the mattress as extremely uncomfortable, affecting his sleep.    He reports elevated blood sugar, which he attributes to recent dietary choices, including greens, sweets, and beer.    He denies any medical diagnoses.    PREVIOUS TREATMENTS:  - Therapy and injection: Helped improve the patient's condition.    SOCIAL HISTORY:  - Alcohol Use: Scooter consumes beer.      ROS:  Constitutional: +sleep disturbances  Musculoskeletal: +joint pain         Initial HPI:  Scooter presents with left shoulder pain that has been bothering him for six to eight weeks. Pain is now on both sides of his shoulder. He reports significant impact on his ability to rest, indicating he cannot sleep at night, although during the day it's good. He currently sleeps in his chair, finding it more comfortable than lying down.    He denies any recent injections into the left shoulder but recalls possibly having an injection on the right side about 20-30 years ago. He is left-handed.    He was previously doing therapy for his back, and a therapist had just started working on his shoulders before referring him to Dr. Lopez. Dr. Lopez took x-rays and then referred him to  the current practitioner.    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 He believes these activities are now affecting his health.    Scooter does not deny any specific medical diagnoses.    PREVIOUS TREATMENTS:  - Scooter has been doing therapy for their back  - Scooter received an injection in the right shoulder approximately 20-30 years ago     WORK STATUS:  - Former  and     ROS:  Musculoskeletal: +joint pain          Past Medical History:   Diagnosis Date    Allergic rhinitis     Anxiety disorder, unspecified     Asthma     Asthmatic bronchitis     Atherosclerosis of aorta     Chronic inflammatory arthritis     Claudication     Diabetic neuropathy     Dysphagia     Family history of aortic aneurysm     GERD (gastroesophageal reflux disease)     Heart murmur     HLD (hyperlipidemia)     HTN (hypertension)     Hypercalcemia     Hyperparathyroidism     Hypoparathyroidism, unspecified     Lower extremity edema     Male erectile dysfunction, unspecified     Multiple actinic keratoses     Nonrheumatic mitral (valve) insufficiency     Osteoarthritis     Personal history of colonic polyps 2017    Tubular adenoma 2017    Type 2 diabetes mellitus     Vitamin D deficiency      Past Surgical History:   Procedure Laterality Date    APPENDECTOMY      COLONOSCOPY  12/05/2022    Dr Himanshu Prajapati - [place], [place]    CYSTOSCOPY  2012    TONSILLECTOMY       Social History     Socioeconomic History    Marital status:    Tobacco Use    Smoking status: Never    Smokeless tobacco: Never   Substance and Sexual Activity    Alcohol use: Yes    Drug use: Never    Sexual activity: Not Currently     Partners: Female     Social Drivers of Health     Financial Resource Strain: Medium Risk (8/26/2024)    Overall Financial Resource Strain (CARDIA)     Difficulty of Paying Living Expenses:  Somewhat hard   Food Insecurity: No Food Insecurity (8/7/2024)    Hunger Vital Sign     Worried About Running Out of Food in the Last Year: Never true     Ran Out of Food in the Last Year: Never true   Transportation Needs: No Transportation Needs (8/7/2024)    TRANSPORTATION NEEDS     Transportation : No   Physical Activity: Sufficiently Active (5/31/2023)    Exercise Vital Sign     Days of Exercise per Week: 6 days     Minutes of Exercise per Session: 30 min   Stress: No Stress Concern Present (5/31/2023)    [employer] [place] [employer] - Occupational Stress Questionnaire     Feeling of Stress : Only a little   Housing Stability: Unknown (8/7/2024)    Housing Stability Vital Sign     Unable to Pay for Housing in the Last Year: No     Homeless in the Last Year: No         Current Outpatient Medications:     albuterol (PROVENTIL/VENTOLIN HFA) 90 mcg/actuation inhaler, INHALE 2 PUFFS BY MOUTH EVERY 4 - 6 HOURS AS NEEDED FOR WHEEZING, Disp: 18 g, Rfl: 11    atorvastatin (LIPITOR) 10 MG tablet, Take 1 tablet (10 mg total) by mouth every evening., Disp: 90 tablet, Rfl: 1    blood sugar diagnostic Strp, 1 each by Misc.(Non-Drug; Combo Route) route Daily., Disp: 50 each, Rfl: 11    blood-glucose sensor (FREESTYLE MADDIE 3 SENSOR) Crystal, 1 each by Misc.(Non-Drug; Combo Route) route every 14 (fourteen) days., Disp: 5 each, Rfl: 10    BREO ELLIPTA 100-25 mcg/dose diskus inhaler, INHALE 1 PUFF INTO THE LUNGS ONCE DAILY, Disp: 60 each, Rfl: 2    ezetimibe (ZETIA) 10 mg tablet, Take 1 tablet (10 mg total) by mouth every evening., Disp: 90 tablet, Rfl: 1    flash glucose sensor (FREESTYLE MADDIE 14 DAY SENSOR) Kit, 1 each by Misc.(Non-Drug; Combo Route) route every 14 (fourteen) days., Disp: 2 kit, Rfl: 5    fluticasone propionate (FLONASE) 50 mcg/actuation nasal spray, 1 spray (50 mcg total) by Each Nostril route 2 (two) times a day., Disp: 16 mL, Rfl: 11    lancets (LANCETS,THIN) Misc, 1 each by Misc.(Non-Drug; Combo  "Route) route Daily., Disp: 50 each, Rfl: 11    meloxicam (MOBIC) 15 MG tablet, TAKE 1 TABLET BY MOUTH EVERY DAY, Disp: 30 tablet, Rfl: 0    metFORMIN (GLUCOPHAGE-XR) 500 MG ER 24hr tablet, Take 2 tablets (1,000 mg total) by mouth 2 (two) times daily with meals., Disp: 360 tablet, Rfl: 1    omeprazole (PRILOSEC) 20 MG capsule, TAKE 1 CAPSULE BY MOUTH EVERY DAY IN THE MORNING, Disp: 90 capsule, Rfl: 1    pyrilamine-chlophedianoL 12.5-12.5 mg/5 mL Liqd, Take 10 mLs by mouth 3 (three) times daily., Disp: 180 mL, Rfl: 0    semaglutide (OZEMPIC) 0.25 mg or 0.5 mg (2 mg/3 mL) pen injector, INJECT 0.5 MG INTO THE SKIN EVERY 7 DAYS., Disp: 9 mL, Rfl: 1    valsartan (DIOVAN) 320 MG tablet, Take 1 tablet (320 mg total) by mouth once daily., Disp: 90 tablet, Rfl: 1    blood-glucose meter kit, Use as instructed, Disp: 1 each, Rfl: 0  Review of patient's allergies indicates:  No Known Allergies    /79   Pulse 79   Ht 5' 5" (1.651 m)   Wt 76.6 kg (168 lb 14 oz)   BMI 28.10 kg/m²     Comprehensive review of systems completed and negative except as per HPI.        Objective:   Head: Normocephalic, without obvious abnormality, atraumatic  Eyes: conjunctivae/corneas clear. EOM's intact  Ears: normal external appearance  Nose: Nares normal. Septum midline. Mucosa normal. No drainage  Throat: normal findings: lips normal without lesions  Lungs: unlabored breathing on room air  Chest wall: symmetric chest rise  Heart: regular rate and rhythm  Pulses: 2+ and symmetric  Skin: Skin color, texture, turgor normal. No rashes or lesions  Neurologic: Grossly normal    left SHOULDER    Appearance:   Normal; hypertrophic AC    Cervical Spine:   No ttp; full, painless ROM; negative Spurlings    Tenderness:   Mild anterior     AROM:   , Abduction 160, ER 70, IR L2    PROM:  same    Pain:  AROM: Positive  PROM:  Positive  End ROM: Positive  Supraspinatus strength testing: Positive  External rotation strength testing: " Negative  Linette-scapular: Negative  Virtually all provacative maneuvers Negative    Strength:  Supraspinatus: intact  External rotation: intact  Linette-scapular: intact    Provocative Maneuvers:     Rotator Cuff/Biceps/AC Joint  Neer's Sign: Positive  Hawkin's Test: Positive  Painful arc: Negative  Belly Press: Negative  Bear Hugger Test: Negative  Hornblower's Sign: Negative  Speed's Test: Positive  Yergeson's Test: Positive  Cross Arm Abduction: Positive    Pulses: Palpable radial pulse    Neurological deficits: None    The patient has a warm and well-perfused upper extremity with capillary refill less than 2 seconds. Sensation is intact to light touch in terminal nerve distributions. 5/5 ain/pin/uln. The patient has no palpable epitrochlear lymphadenopathy.      Assessment:     Imaging:   X-Ray Shoulder 2 or More Views Left  Narrative: EXAMINATION:  XR SHOULDER COMPLETE 2 OR MORE VIEWS LEFT    CLINICAL HISTORY:  Pain in left shoulder    TECHNIQUE:  Two or three views of the left shoulder were performed.    COMPARISON:  None    FINDINGS:  There are no fractures seen.  There degenerative changes in the AC joint.  There is no dislocation.  Impression: Degenerative changes in the AC joint    Electronically signed by: Toni Toledo MD  Date:    01/09/2025  Time:    18:48    X-rays show no acute bony abnormality.  Moderate AC arthritis.        1. Arthralgia of left acromioclavicular joint            Plan:            Imaging and exam findings discussed.  Patient has some arthritis of his AC joint but also has findings consistent with bursitis/impingement. Doing very well after CSI and PT. Continue all activities as able. Avoid aggravating activities.  Symptomatic management. F/u PRN.  Discussed repeat injection if his pain returns.  All questions were answered. Patient happy and in agreement with the plan.

## 2025-05-13 ENCOUNTER — PATIENT OUTREACH (OUTPATIENT)
Dept: ADMINISTRATIVE | Facility: HOSPITAL | Age: 75
End: 2025-05-13
Payer: MEDICARE

## 2025-05-15 DIAGNOSIS — E11.65 TYPE 2 DIABETES MELLITUS WITH HYPERGLYCEMIA, WITHOUT LONG-TERM CURRENT USE OF INSULIN: Chronic | ICD-10-CM

## 2025-05-15 RX ORDER — BLOOD-GLUCOSE SENSOR
1 EACH MISCELLANEOUS
Qty: 5 EACH | Refills: 10 | Status: SHIPPED | OUTPATIENT
Start: 2025-05-15 | End: 2026-05-15

## 2025-05-22 ENCOUNTER — LAB VISIT (OUTPATIENT)
Dept: LAB | Facility: HOSPITAL | Age: 75
End: 2025-05-22
Attending: NURSE PRACTITIONER
Payer: MEDICARE

## 2025-05-22 DIAGNOSIS — E78.5 HYPERLIPIDEMIA, UNSPECIFIED HYPERLIPIDEMIA TYPE: ICD-10-CM

## 2025-05-22 DIAGNOSIS — I10 HYPERTENSION, UNSPECIFIED TYPE: ICD-10-CM

## 2025-05-22 DIAGNOSIS — E11.649 DIABETIC HYPOGLYCEMIA: ICD-10-CM

## 2025-05-22 LAB
ALBUMIN SERPL-MCNC: 3.9 G/DL (ref 3.4–4.8)
ALBUMIN/GLOB SERPL: 1.1 RATIO (ref 1.1–2)
ALP SERPL-CCNC: 58 UNIT/L (ref 40–150)
ALT SERPL-CCNC: 31 UNIT/L (ref 0–55)
ANION GAP SERPL CALC-SCNC: 9 MEQ/L
AST SERPL-CCNC: 24 UNIT/L (ref 11–45)
BASOPHILS # BLD AUTO: 0.07 X10(3)/MCL
BASOPHILS NFR BLD AUTO: 1 %
BILIRUB SERPL-MCNC: 0.8 MG/DL
BUN SERPL-MCNC: 24 MG/DL (ref 8.4–25.7)
CALCIUM SERPL-MCNC: 9.2 MG/DL (ref 8.8–10)
CHLORIDE SERPL-SCNC: 107 MMOL/L (ref 98–107)
CHOLEST SERPL-MCNC: 100 MG/DL
CHOLEST/HDLC SERPL: 2 {RATIO} (ref 0–5)
CO2 SERPL-SCNC: 26 MMOL/L (ref 23–31)
CREAT SERPL-MCNC: 0.77 MG/DL (ref 0.72–1.25)
CREAT/UREA NIT SERPL: 31
EOSINOPHIL # BLD AUTO: 0.4 X10(3)/MCL (ref 0–0.9)
EOSINOPHIL NFR BLD AUTO: 5.9 %
ERYTHROCYTE [DISTWIDTH] IN BLOOD BY AUTOMATED COUNT: 13.3 % (ref 11.5–17)
GFR SERPLBLD CREATININE-BSD FMLA CKD-EPI: >60 ML/MIN/1.73/M2
GLOBULIN SER-MCNC: 3.4 GM/DL (ref 2.4–3.5)
GLUCOSE SERPL-MCNC: 111 MG/DL (ref 82–115)
HCT VFR BLD AUTO: 41.1 % (ref 42–52)
HDLC SERPL-MCNC: 43 MG/DL (ref 35–60)
HGB BLD-MCNC: 13.4 G/DL (ref 14–18)
IMM GRANULOCYTES # BLD AUTO: 0.01 X10(3)/MCL (ref 0–0.04)
IMM GRANULOCYTES NFR BLD AUTO: 0.1 %
INSULIN SERPL-MCNC: 5.8 UU/ML
LDLC SERPL CALC-MCNC: 43 MG/DL (ref 50–140)
LYMPHOCYTES # BLD AUTO: 1.38 X10(3)/MCL (ref 0.6–4.6)
LYMPHOCYTES NFR BLD AUTO: 20.5 %
MCH RBC QN AUTO: 29 PG (ref 27–31)
MCHC RBC AUTO-ENTMCNC: 32.6 G/DL (ref 33–36)
MCV RBC AUTO: 89 FL (ref 80–94)
MONOCYTES # BLD AUTO: 0.65 X10(3)/MCL (ref 0.1–1.3)
MONOCYTES NFR BLD AUTO: 9.6 %
NEUTROPHILS # BLD AUTO: 4.23 X10(3)/MCL (ref 2.1–9.2)
NEUTROPHILS NFR BLD AUTO: 62.9 %
NRBC BLD AUTO-RTO: 0 %
PLATELET # BLD AUTO: 155 X10(3)/MCL (ref 130–400)
PMV BLD AUTO: 10.5 FL (ref 7.4–10.4)
POTASSIUM SERPL-SCNC: 4.5 MMOL/L (ref 3.5–5.1)
PROT SERPL-MCNC: 7.3 GM/DL (ref 5.8–7.6)
RBC # BLD AUTO: 4.62 X10(6)/MCL (ref 4.7–6.1)
SODIUM SERPL-SCNC: 142 MMOL/L (ref 136–145)
T3FREE SERPL-MCNC: 2.42 PG/ML (ref 1.58–3.91)
T4 FREE SERPL-MCNC: 0.91 NG/DL (ref 0.7–1.48)
TRIGL SERPL-MCNC: 72 MG/DL (ref 34–140)
TSH SERPL-ACNC: 1.22 UIU/ML (ref 0.35–4.94)
VLDLC SERPL CALC-MCNC: 14 MG/DL
WBC # BLD AUTO: 6.74 X10(3)/MCL (ref 4.5–11.5)

## 2025-05-22 PROCEDURE — 84443 ASSAY THYROID STIM HORMONE: CPT

## 2025-05-22 PROCEDURE — 80053 COMPREHEN METABOLIC PANEL: CPT

## 2025-05-22 PROCEDURE — 83525 ASSAY OF INSULIN: CPT

## 2025-05-22 PROCEDURE — 80061 LIPID PANEL: CPT

## 2025-05-22 PROCEDURE — 36415 COLL VENOUS BLD VENIPUNCTURE: CPT

## 2025-05-22 PROCEDURE — 84439 ASSAY OF FREE THYROXINE: CPT

## 2025-05-22 PROCEDURE — 84481 FREE ASSAY (FT-3): CPT

## 2025-05-22 PROCEDURE — 85025 COMPLETE CBC W/AUTO DIFF WBC: CPT

## 2025-06-04 DIAGNOSIS — E78.00 HYPERCHOLESTEROLEMIA: ICD-10-CM

## 2025-06-04 RX ORDER — ATORVASTATIN CALCIUM 10 MG/1
10 TABLET, FILM COATED ORAL NIGHTLY
Qty: 90 TABLET | Refills: 1 | Status: SHIPPED | OUTPATIENT
Start: 2025-06-04

## 2025-06-16 ENCOUNTER — RESULTS FOLLOW-UP (OUTPATIENT)
Dept: FAMILY MEDICINE | Facility: CLINIC | Age: 75
End: 2025-06-16

## 2025-06-16 ENCOUNTER — LAB VISIT (OUTPATIENT)
Dept: LAB | Facility: HOSPITAL | Age: 75
End: 2025-06-16
Payer: MEDICARE

## 2025-06-16 ENCOUNTER — OFFICE VISIT (OUTPATIENT)
Dept: FAMILY MEDICINE | Facility: CLINIC | Age: 75
End: 2025-06-16
Payer: MEDICARE

## 2025-06-16 VITALS
WEIGHT: 172.81 LBS | SYSTOLIC BLOOD PRESSURE: 136 MMHG | RESPIRATION RATE: 20 BRPM | OXYGEN SATURATION: 97 % | HEIGHT: 65 IN | BODY MASS INDEX: 28.79 KG/M2 | HEART RATE: 73 BPM | DIASTOLIC BLOOD PRESSURE: 70 MMHG | TEMPERATURE: 98 F

## 2025-06-16 DIAGNOSIS — E11.65 TYPE 2 DIABETES MELLITUS WITH HYPERGLYCEMIA, WITHOUT LONG-TERM CURRENT USE OF INSULIN: Chronic | ICD-10-CM

## 2025-06-16 DIAGNOSIS — E78.00 HYPERCHOLESTEROLEMIA: ICD-10-CM

## 2025-06-16 DIAGNOSIS — G56.02 CARPAL TUNNEL SYNDROME OF LEFT WRIST: ICD-10-CM

## 2025-06-16 DIAGNOSIS — E11.65 TYPE 2 DIABETES MELLITUS WITH HYPERGLYCEMIA, WITHOUT LONG-TERM CURRENT USE OF INSULIN: Primary | Chronic | ICD-10-CM

## 2025-06-16 DIAGNOSIS — K21.9 GASTROESOPHAGEAL REFLUX DISEASE, UNSPECIFIED WHETHER ESOPHAGITIS PRESENT: ICD-10-CM

## 2025-06-16 DIAGNOSIS — I10 PRIMARY HYPERTENSION: ICD-10-CM

## 2025-06-16 LAB
ALBUMIN SERPL-MCNC: 3.8 G/DL (ref 3.4–4.8)
ALBUMIN/GLOB SERPL: 1.2 RATIO (ref 1.1–2)
ALP SERPL-CCNC: 59 UNIT/L (ref 40–150)
ALT SERPL-CCNC: 26 UNIT/L (ref 0–55)
ANION GAP SERPL CALC-SCNC: 8 MEQ/L
AST SERPL-CCNC: 20 UNIT/L (ref 11–45)
BILIRUB SERPL-MCNC: 0.6 MG/DL
BUN SERPL-MCNC: 20 MG/DL (ref 8.4–25.7)
CALCIUM SERPL-MCNC: 9.3 MG/DL (ref 8.8–10)
CHLORIDE SERPL-SCNC: 106 MMOL/L (ref 98–107)
CHOLEST SERPL-MCNC: 103 MG/DL
CHOLEST/HDLC SERPL: 2 {RATIO} (ref 0–5)
CO2 SERPL-SCNC: 28 MMOL/L (ref 23–31)
CREAT SERPL-MCNC: 0.79 MG/DL (ref 0.72–1.25)
CREAT UR-MCNC: 35.4 MG/DL (ref 63–166)
CREAT/UREA NIT SERPL: 25
EST. AVERAGE GLUCOSE BLD GHB EST-MCNC: 116.9 MG/DL
GFR SERPLBLD CREATININE-BSD FMLA CKD-EPI: >60 ML/MIN/1.73/M2
GLOBULIN SER-MCNC: 3.3 GM/DL (ref 2.4–3.5)
GLUCOSE SERPL-MCNC: 102 MG/DL (ref 82–115)
HBA1C MFR BLD: 5.7 %
HDLC SERPL-MCNC: 47 MG/DL (ref 35–60)
LDLC SERPL CALC-MCNC: 44 MG/DL (ref 50–140)
MICROALBUMIN UR-MCNC: <5 UG/ML
MICROALBUMIN/CREAT RATIO PNL UR: ABNORMAL
POTASSIUM SERPL-SCNC: 4.4 MMOL/L (ref 3.5–5.1)
PROT SERPL-MCNC: 7.1 GM/DL (ref 5.8–7.6)
SODIUM SERPL-SCNC: 142 MMOL/L (ref 136–145)
TRIGL SERPL-MCNC: 61 MG/DL (ref 34–140)
VLDLC SERPL CALC-MCNC: 12 MG/DL

## 2025-06-16 PROCEDURE — 80053 COMPREHEN METABOLIC PANEL: CPT

## 2025-06-16 PROCEDURE — 82570 ASSAY OF URINE CREATININE: CPT

## 2025-06-16 PROCEDURE — 83036 HEMOGLOBIN GLYCOSYLATED A1C: CPT

## 2025-06-16 PROCEDURE — 80061 LIPID PANEL: CPT

## 2025-06-16 PROCEDURE — 36415 COLL VENOUS BLD VENIPUNCTURE: CPT

## 2025-06-16 RX ORDER — TRIAMCINOLONE ACETONIDE 5 MG/G
CREAM TOPICAL
COMMUNITY

## 2025-06-16 RX ORDER — VALSARTAN 320 MG/1
320 TABLET ORAL DAILY
Qty: 90 TABLET | Refills: 1 | Status: SHIPPED | OUTPATIENT
Start: 2025-06-16 | End: 2025-12-13

## 2025-06-16 RX ORDER — METFORMIN HYDROCHLORIDE 500 MG/1
1000 TABLET, EXTENDED RELEASE ORAL 2 TIMES DAILY WITH MEALS
Qty: 360 TABLET | Refills: 1 | Status: SHIPPED | OUTPATIENT
Start: 2025-06-16 | End: 2025-12-13

## 2025-06-16 RX ORDER — ATORVASTATIN CALCIUM 10 MG/1
10 TABLET, FILM COATED ORAL NIGHTLY
Qty: 90 TABLET | Refills: 1 | Status: SHIPPED | OUTPATIENT
Start: 2025-06-16

## 2025-06-16 RX ORDER — EZETIMIBE 10 MG/1
10 TABLET ORAL NIGHTLY
Qty: 90 TABLET | Refills: 1 | Status: SHIPPED | OUTPATIENT
Start: 2025-06-16 | End: 2025-12-13

## 2025-06-16 RX ORDER — OMEPRAZOLE 20 MG/1
20 CAPSULE, DELAYED RELEASE ORAL EVERY MORNING
Qty: 90 CAPSULE | Refills: 1 | Status: SHIPPED | OUTPATIENT
Start: 2025-06-16

## 2025-06-16 RX ORDER — SIMVASTATIN 20 MG/1
1 TABLET, FILM COATED ORAL NIGHTLY
COMMUNITY
End: 2025-06-16

## 2025-06-16 RX ORDER — SEMAGLUTIDE 0.68 MG/ML
0.5 INJECTION, SOLUTION SUBCUTANEOUS
Qty: 9 ML | Refills: 1 | Status: SHIPPED | OUTPATIENT
Start: 2025-06-16 | End: 2025-12-13

## 2025-06-16 NOTE — PROGRESS NOTES
Patient ID: [id number]     Chief Complaint: Diabetes (6 month)    HPI:     Scooter presents today for follow up of diabetes management    DIABETES MANAGEMENT:  He uses [employer] for glucose monitoring. GMI reading over the last 90 days is 6.3, increased from previously maintained level of 6.1. He reports increased cravings for sweets, noting that he has never experienced such intense sweet cravings before.    WEIGHT MANAGEMENT:  He reports weight loss with reduction in waist size from 36 to 32 inches, which he attributes to semaglutide (Ozempic) therapy.    NEUROLOGICAL SYMPTOMS:  He reports experiencing numbness in hand over the past couple weeks. The numbness occurs in specific positions including when hand is positioned over heart, while holding phone, and when gripping steering wheel while driving.        Past Medical History:   Diagnosis Date    Allergic rhinitis     Anxiety disorder, unspecified     Asthma     Asthmatic bronchitis     Atherosclerosis of aorta     Chronic inflammatory arthritis     Claudication     Diabetic neuropathy     Dysphagia     Family history of aortic aneurysm     GERD (gastroesophageal reflux disease)     Heart murmur     HLD (hyperlipidemia)     HTN (hypertension)     Hypercalcemia     Hyperparathyroidism     Hypoparathyroidism, unspecified     Lower extremity edema     Male erectile dysfunction, unspecified     Multiple actinic keratoses     Nonrheumatic mitral (valve) insufficiency     Osteoarthritis     Personal history of colonic polyps 2017    Tubular adenoma 2017    Type 2 diabetes mellitus     Vitamin D deficiency         Past Surgical History:   Procedure Laterality Date    APPENDECTOMY      COLONOSCOPY  12/05/2022    Dr Himanshu Prajapati - [place], [place]    CYSTOSCOPY  2012    TONSILLECTOMY          Social History     Socioeconomic History    Marital status:    Tobacco Use    Smoking status: Never    Smokeless tobacco: Never   Substance and Sexual Activity    Alcohol  use: Yes    Drug use: Never    Sexual activity: Not Currently     Partners: Female     Social Drivers of Health     Financial Resource Strain: Medium Risk (8/26/2024)    Overall Financial Resource Strain (CARDIA)     Difficulty of Paying Living Expenses: Somewhat hard   Food Insecurity: No Food Insecurity (8/7/2024)    Hunger Vital Sign     Worried About Running Out of Food in the Last Year: Never true     Ran Out of Food in the Last Year: Never true   Transportation Needs: No Transportation Needs (8/7/2024)    TRANSPORTATION NEEDS     Transportation : No   Physical Activity: Sufficiently Active (5/31/2023)    Exercise Vital Sign     Days of Exercise per Week: 6 days     Minutes of Exercise per Session: 30 min   Stress: No Stress Concern Present (5/31/2023)    [employer] - Occupational Stress Questionnaire     Feeling of Stress : Only a little   Housing Stability: Unknown (8/7/2024)    Housing Stability Vital Sign     Unable to Pay for Housing in the Last Year: No     Homeless in the Last Year: No        Current Outpatient Medications   Medication Instructions    albuterol (PROVENTIL/VENTOLIN HFA) 90 mcg/actuation inhaler INHALE 2 PUFFS BY MOUTH EVERY 4 - 6 HOURS AS NEEDED FOR WHEEZING    atorvastatin (LIPITOR) 10 mg, Oral, Nightly    blood sugar diagnostic Strp 1 each, Misc.(Non-Drug; Combo Route), Daily    blood-glucose meter kit Use as instructed    blood-glucose sensor (FREESTYLE MADDIE 3 SENSOR) Crystal 1 each, Misc.(Non-Drug; Combo Route), Every 14 days    BREO ELLIPTA 100-25 mcg/dose diskus inhaler 1 puff, Inhalation    ezetimibe (ZETIA) 10 mg, Oral, Nightly    flash glucose sensor (FREESTYLE MADDIE 14 DAY SENSOR) Kit 1 each, Misc.(Non-Drug; Combo Route), Every 14 days    fluticasone propionate (FLONASE) 50 mcg, Each Nostril, 2 times daily    lancets (LANCETS,THIN) Misc 1 each, Misc.(Non-Drug; Combo Route), Daily    meloxicam (MOBIC) 15 mg, Oral    metFORMIN (GLUCOPHAGE-XR) 1,000 mg,  "Oral, 2 times daily with meals    omeprazole (PRILOSEC) 20 mg, Oral, Every morning    OZEMPIC 0.5 mg, Subcutaneous, Every 7 days    pyrilamine-chlophedianoL 12.5-12.5 mg/5 mL Liqd 10 mLs, Oral, 3 times daily    triamcinolone acetonide 0.5% (KENALOG) 0.5 % Crea     valsartan (DIOVAN) 320 mg, Oral, Daily       Review of patient's allergies indicates:  No Known Allergies     Patient Care Team:  John Ford DO as PCP - General (Family Medicine)  [facility] -  Ulysses Hernandez MD as Consulting Provider (Rheumatology)  Lizz Britt as Consulting Provider (Internal Medicine)  Bam Sagastume MD as Consulting Provider (Dermatology)  Letha De Anda MD as Consulting Provider (Cardiology)  [facility]  Max Gonzalez MD as Resident (Cardiology)  [facility] (Optometry)  Bridger Stringer MD (Ophthalmology)     Subjective:     Review of Systems    12 point review of systems conducted, negative except as stated in the history of present illness. See HPI for details.    Objective:     Visit Vitals  /70 (BP Location: Right arm, Patient Position: Sitting)   Pulse 73   Temp 98 °F (36.7 °C)   Resp 20   Ht 5' 5" (1.651 m)   Wt 78.4 kg (172 lb 12.8 oz)   SpO2 97%   BMI 28.76 kg/m²       Physical Exam  Vitals reviewed.   Constitutional:       General: He is not in acute distress.     Appearance: Normal appearance. He is not ill-appearing.   Cardiovascular:      Rate and Rhythm: Normal rate and regular rhythm.      Pulses: Normal pulses.      Heart sounds: Normal heart sounds. No murmur heard.     No friction rub. No gallop.   Pulmonary:      Effort: No respiratory distress.      Breath sounds: No wheezing, rhonchi or rales.   Musculoskeletal:         General: No swelling or tenderness.      Right lower leg: No edema.      Left lower leg: No edema.   Skin:     General: Skin is warm and dry.   Neurological:      General: No focal deficit present.      " Mental Status: He is alert.   Psychiatric:         Mood and Affect: Mood normal.         Behavior: Behavior normal.       Labs Reviewed:     Chemistry:  Lab Results   Component Value Date     05/22/2025    K 4.5 05/22/2025    BUN 24.0 05/22/2025    CREATININE 0.77 05/22/2025    EGFRNORACEVR >60 05/22/2025    CALCIUM 9.2 05/22/2025    ALKPHOS 58 05/22/2025    ALBUMIN 3.9 05/22/2025    AST 24 05/22/2025    ALT 31 05/22/2025    PHOS 3.3 08/16/2022    [id number] 54.9 11/30/2023    TSH 1.215 05/22/2025    [id number] 0.91 05/22/2025    PSA 2.66 12/02/2024        Lab Results   Component Value Date    HGBA1C 6.1 02/27/2025        Hematology:  Lab Results   Component Value Date    WBC 6.74 05/22/2025    HGB 13.4 (L) 05/22/2025    HCT 41.1 (L) 05/22/2025     05/22/2025       Lipid Panel:  Lab Results   Component Value Date    CHOL 100 05/22/2025    HDL 43 05/22/2025    LDL 43.00 (L) 05/22/2025    TRIG 72 05/22/2025    TOTALCHOLEST 2 05/22/2025        Urine:  Lab Results   Component Value Date    CREATRANDUR 60.6 (L) 03/07/2024     Assessment:       ICD-10-CM ICD-9-CM   1. Type 2 diabetes mellitus with hyperglycemia, without long-term current use of insulin  E11.65 250.00     790.29   2. Hypercholesterolemia  E78.00 272.0   3. Gastroesophageal reflux disease, unspecified whether esophagitis present  K21.9 530.81   4. Primary hypertension  I10 401.9   5. Carpal tunnel syndrome of left wrist  G56.02 354.0     Plan:     1. Type 2 diabetes mellitus with hyperglycemia, without long-term current use of insulin  Overview:  Hemoglobin A1C   Date Value Ref Range Status   10/19/2022 6.8 (H) 4.0 - 6.0 % Final     Hemoglobin A1c   Date Value Ref Range Status   02/27/2025 6.1 <=7.0 % Final   12/02/2024 6.0 <=7.0 % Final   07/31/2024 6.0 <=7.0 % Final       Assessment & Plan:  Scooter's glucose level is 6.3 GMI over the last 90 days with A1C reduced to 6.1 and maintained at that level for an extended period.  Continuing  semaglutide (Ozempic) 0.5 mg weekly, which has contributed to weight loss.  Confirmed patient is using [employer] for glucose monitoring.  Ordered A1C and BMP to be completed a few days before next appointment.    Orders:  -     Hemoglobin A1C; Future; Expected date: 06/16/2025  -     metFORMIN (GLUCOPHAGE-XR) 500 MG ER 24hr tablet; Take 2 tablets (1,000 mg total) by mouth 2 (two) times daily with meals.  Dispense: 360 tablet; Refill: 1  -     semaglutide (OZEMPIC) 0.25 mg or 0.5 mg (2 mg/3 mL) pen injector; Inject 0.5 mg into the skin every 7 days.  Dispense: 9 mL; Refill: 1  -     Basic Metabolic Panel; Future; Expected date: 06/16/2025    2. Hypercholesterolemia  Assessment & Plan:  Recheck Lipid Panel today.     Orders:  -     atorvastatin (LIPITOR) 10 MG tablet; Take 1 tablet (10 mg total) by mouth every evening.  Dispense: 90 tablet; Refill: 1  -     ezetimibe (ZETIA) 10 mg tablet; Take 1 tablet (10 mg total) by mouth every evening.  Dispense: 90 tablet; Refill: 1    3. Gastroesophageal reflux disease, unspecified whether esophagitis present  Assessment & Plan:  Stable.   Continue Omeprazole 20 mg daily.   Avoid spicy, acidic, fried foods and alcohol.  Eat 2-3 hours before going to bed.  Avoid tight clothing, chew food thoroughly.  Reduce caffeine intake, avoid soda.      Orders:  -     omeprazole (PRILOSEC) 20 MG capsule; Take 1 capsule (20 mg total) by mouth every morning.  Dispense: 90 capsule; Refill: 1    4. Primary hypertension  Assessment & Plan:  Well controlled.   Continue Valsartan 320 mg.   Low Sodium Diet (DASH Diet - Less than 2 grams of sodium per day).  Monitor blood pressure daily and log. Report consistent numbers greater than 140/90.  Exercise as tolerated.     Orders:  -     valsartan (DIOVAN) 320 MG tablet; Take 1 tablet (320 mg total) by mouth once daily.  Dispense: 90 tablet; Refill: 1    5. Carpal tunnel syndrome of left wrist  Assessment & Plan:  Monitored symptoms, including  numbness in hand when holding phone or steering wheel.  Assessed the cause as likely nerve impingement.       GENERAL FOLLOW-UP:  Continuing all current medications.  Follow up on December 12th for Medicare wellness visit.  Scooter advised to contact the office if unable to reach by phone; office contact number provided.       No follow-ups on file. In addition to their scheduled follow up, the patient has also been instructed to follow up on as needed basis.     This note was generated with the assistance of ambient listening technology. Verbal consent was obtained by the patient and accompanying visitor(s) for the recording of patient appointment to facilitate this note. I attest to having reviewed and edited the generated note for accuracy, though some syntax or spelling errors may persist. Please contact the author of this note for any clarification.      Lo Qureshi, Adult-Gerontology NP

## 2025-06-16 NOTE — ASSESSMENT & PLAN NOTE
Scooter's glucose level is 6.3 GMI over the last 90 days with A1C reduced to 6.1 and maintained at that level for an extended period.  Continuing semaglutide (Ozempic) 0.5 mg weekly, which has contributed to weight loss.  Confirmed patient is using [employer] for glucose monitoring.  Ordered A1C and BMP to be completed a few days before next appointment.

## 2025-06-16 NOTE — ASSESSMENT & PLAN NOTE
Monitored symptoms, including numbness in hand when holding phone or steering wheel.  Assessed the cause as likely nerve impingement.

## 2025-08-04 ENCOUNTER — TELEPHONE (OUTPATIENT)
Dept: FAMILY MEDICINE | Facility: CLINIC | Age: 75
End: 2025-08-04
Payer: MEDICARE

## 2025-08-23 DIAGNOSIS — J45.30 MILD PERSISTENT ASTHMA WITHOUT COMPLICATION: ICD-10-CM

## 2025-08-25 RX ORDER — ALBUTEROL SULFATE 90 UG/1
INHALANT RESPIRATORY (INHALATION)
Qty: 8 G | Refills: 11 | Status: SHIPPED | OUTPATIENT
Start: 2025-08-25

## 2025-08-25 RX ORDER — OMEPRAZOLE 40 MG/1
40 CAPSULE, DELAYED RELEASE ORAL 2 TIMES DAILY
Qty: 180 CAPSULE | Refills: 1 | Status: SHIPPED | OUTPATIENT
Start: 2025-08-25